# Patient Record
Sex: FEMALE | Race: WHITE | NOT HISPANIC OR LATINO | Employment: UNEMPLOYED | URBAN - METROPOLITAN AREA
[De-identification: names, ages, dates, MRNs, and addresses within clinical notes are randomized per-mention and may not be internally consistent; named-entity substitution may affect disease eponyms.]

---

## 2017-03-13 ENCOUNTER — HOSPITAL ENCOUNTER (EMERGENCY)
Facility: HOSPITAL | Age: 27
Discharge: HOME | End: 2017-03-13
Attending: EMERGENCY MEDICINE | Admitting: EMERGENCY MEDICINE

## 2017-03-13 ENCOUNTER — APPOINTMENT (OUTPATIENT)
Dept: CT IMAGING | Facility: HOSPITAL | Age: 27
End: 2017-03-13

## 2017-03-13 VITALS
RESPIRATION RATE: 16 BRPM | WEIGHT: 134 LBS | HEART RATE: 88 BPM | OXYGEN SATURATION: 99 % | TEMPERATURE: 98.2 F | SYSTOLIC BLOOD PRESSURE: 109 MMHG | DIASTOLIC BLOOD PRESSURE: 73 MMHG | HEIGHT: 62 IN | BODY MASS INDEX: 24.66 KG/M2

## 2017-03-13 DIAGNOSIS — R51.9 ACUTE NONINTRACTABLE HEADACHE, UNSPECIFIED HEADACHE TYPE: Primary | ICD-10-CM

## 2017-03-13 LAB
ALBUMIN SERPL-MCNC: 3.2 G/DL (ref 3.5–5.2)
ALBUMIN/GLOB SERPL: 0.5 G/DL
ALP SERPL-CCNC: 49 U/L (ref 40–129)
ALT SERPL W P-5'-P-CCNC: 50 U/L (ref 5–33)
ANION GAP SERPL CALCULATED.3IONS-SCNC: 14.1 MMOL/L
AST SERPL-CCNC: 46 U/L (ref 5–32)
BASOPHILS # BLD AUTO: 0.01 10*3/MM3 (ref 0–0.2)
BASOPHILS NFR BLD AUTO: 0.1 % (ref 0–2)
BILIRUB SERPL-MCNC: 0.3 MG/DL (ref 0.2–1.2)
BUN BLD-MCNC: 12 MG/DL (ref 6–20)
BUN/CREAT SERPL: 15.6 (ref 7–25)
CALCIUM SPEC-SCNC: 8.3 MG/DL (ref 8.6–10.5)
CHLORIDE SERPL-SCNC: 100 MMOL/L (ref 98–107)
CK SERPL-CCNC: 620 U/L (ref 26–142)
CO2 SERPL-SCNC: 22.9 MMOL/L (ref 22–29)
CREAT BLD-MCNC: 0.77 MG/DL (ref 0.57–1)
DEPRECATED RDW RBC AUTO: 49.5 FL (ref 37–54)
EOSINOPHIL # BLD AUTO: 0.01 10*3/MM3 (ref 0.1–0.3)
EOSINOPHIL NFR BLD AUTO: 0.1 % (ref 0–4)
ERYTHROCYTE [DISTWIDTH] IN BLOOD BY AUTOMATED COUNT: 15.3 % (ref 11.5–14.5)
GFR SERPL CREATININE-BSD FRML MDRD: 89 ML/MIN/1.73
GLOBULIN UR ELPH-MCNC: 6 GM/DL
GLUCOSE BLD-MCNC: 105 MG/DL (ref 65–99)
HCT VFR BLD AUTO: 29.8 % (ref 37–47)
HGB BLD-MCNC: 9.3 G/DL (ref 12–16)
IMM GRANULOCYTES # BLD: 0.04 10*3/MM3 (ref 0–0.03)
IMM GRANULOCYTES NFR BLD: 0.6 % (ref 0–0.5)
LYMPHOCYTES # BLD AUTO: 0.98 10*3/MM3 (ref 0.6–4.8)
LYMPHOCYTES NFR BLD AUTO: 13.6 % (ref 20–45)
MCH RBC QN AUTO: 28.1 PG (ref 27–31)
MCHC RBC AUTO-ENTMCNC: 31.2 G/DL (ref 31–37)
MCV RBC AUTO: 90 FL (ref 81–99)
MONOCYTES # BLD AUTO: 0.19 10*3/MM3 (ref 0–1)
MONOCYTES NFR BLD AUTO: 2.6 % (ref 3–8)
NEUTROPHILS # BLD AUTO: 6 10*3/MM3 (ref 1.5–8.3)
NEUTROPHILS NFR BLD AUTO: 83 % (ref 45–70)
NRBC BLD MANUAL-RTO: 0 /100 WBC (ref 0–0)
PLATELET # BLD AUTO: 262 10*3/MM3 (ref 140–500)
PMV BLD AUTO: 10.6 FL (ref 7.4–10.4)
POTASSIUM BLD-SCNC: 3.1 MMOL/L (ref 3.5–5.2)
PROT SERPL-MCNC: 9.2 G/DL (ref 6–8.5)
RBC # BLD AUTO: 3.31 10*6/MM3 (ref 4.2–5.4)
SODIUM BLD-SCNC: 137 MMOL/L (ref 136–145)
WBC NRBC COR # BLD: 7.23 10*3/MM3 (ref 4.8–10.8)

## 2017-03-13 PROCEDURE — 25010000002 DIPHENHYDRAMINE PER 50 MG: Performed by: EMERGENCY MEDICINE

## 2017-03-13 PROCEDURE — 70450 CT HEAD/BRAIN W/O DYE: CPT

## 2017-03-13 PROCEDURE — 96372 THER/PROPH/DIAG INJ SC/IM: CPT

## 2017-03-13 PROCEDURE — 25010000002 HYDROMORPHONE PER 4 MG

## 2017-03-13 PROCEDURE — 25010000002 METOCLOPRAMIDE PER 10 MG: Performed by: EMERGENCY MEDICINE

## 2017-03-13 PROCEDURE — 85025 COMPLETE CBC W/AUTO DIFF WBC: CPT | Performed by: EMERGENCY MEDICINE

## 2017-03-13 PROCEDURE — 99284 EMERGENCY DEPT VISIT MOD MDM: CPT

## 2017-03-13 PROCEDURE — 25010000002 ONDANSETRON PER 1 MG

## 2017-03-13 PROCEDURE — 99284 EMERGENCY DEPT VISIT MOD MDM: CPT | Performed by: EMERGENCY MEDICINE

## 2017-03-13 PROCEDURE — 96361 HYDRATE IV INFUSION ADD-ON: CPT

## 2017-03-13 PROCEDURE — 80053 COMPREHEN METABOLIC PANEL: CPT | Performed by: EMERGENCY MEDICINE

## 2017-03-13 PROCEDURE — 96375 TX/PRO/DX INJ NEW DRUG ADDON: CPT

## 2017-03-13 PROCEDURE — 82550 ASSAY OF CK (CPK): CPT | Performed by: EMERGENCY MEDICINE

## 2017-03-13 PROCEDURE — 96374 THER/PROPH/DIAG INJ IV PUSH: CPT

## 2017-03-13 RX ORDER — METOCLOPRAMIDE HYDROCHLORIDE 5 MG/ML
10 INJECTION INTRAMUSCULAR; INTRAVENOUS ONCE
Status: COMPLETED | OUTPATIENT
Start: 2017-03-13 | End: 2017-03-13

## 2017-03-13 RX ORDER — SUMATRIPTAN 6 MG/.5ML
6 INJECTION, SOLUTION SUBCUTANEOUS ONCE
Status: COMPLETED | OUTPATIENT
Start: 2017-03-13 | End: 2017-03-13

## 2017-03-13 RX ORDER — IBUPROFEN 400 MG/1
TABLET ORAL
Status: COMPLETED
Start: 2017-03-13 | End: 2017-03-13

## 2017-03-13 RX ORDER — FOLIC ACID 1 MG/1
1 TABLET ORAL DAILY
COMMUNITY
End: 2018-06-27

## 2017-03-13 RX ORDER — IBUPROFEN 400 MG/1
800 TABLET ORAL ONCE
Status: COMPLETED | OUTPATIENT
Start: 2017-03-13 | End: 2017-03-13

## 2017-03-13 RX ORDER — ONDANSETRON 2 MG/ML
INJECTION INTRAMUSCULAR; INTRAVENOUS
Status: COMPLETED
Start: 2017-03-13 | End: 2017-03-13

## 2017-03-13 RX ORDER — ONDANSETRON 2 MG/ML
4 INJECTION INTRAMUSCULAR; INTRAVENOUS ONCE
Status: COMPLETED | OUTPATIENT
Start: 2017-03-13 | End: 2017-03-13

## 2017-03-13 RX ORDER — METOCLOPRAMIDE 10 MG/1
10 TABLET ORAL 3 TIMES DAILY PRN
Qty: 20 TABLET | Refills: 0 | Status: SHIPPED | OUTPATIENT
Start: 2017-03-13 | End: 2018-07-02

## 2017-03-13 RX ORDER — DIPHENHYDRAMINE HYDROCHLORIDE 50 MG/ML
25 INJECTION INTRAMUSCULAR; INTRAVENOUS ONCE
Status: COMPLETED | OUTPATIENT
Start: 2017-03-13 | End: 2017-03-13

## 2017-03-13 RX ORDER — METHYLPREDNISOLONE 4 MG/1
2 TABLET ORAL DAILY
COMMUNITY
End: 2021-02-02

## 2017-03-13 RX ORDER — HYDROCODONE BITARTRATE AND ACETAMINOPHEN 5; 325 MG/1; MG/1
1 TABLET ORAL EVERY 4 HOURS PRN
Qty: 30 TABLET | Refills: 0 | Status: SHIPPED | OUTPATIENT
Start: 2017-03-13 | End: 2018-06-27

## 2017-03-13 RX ADMIN — METOCLOPRAMIDE 10 MG: 5 INJECTION, SOLUTION INTRAMUSCULAR; INTRAVENOUS at 08:25

## 2017-03-13 RX ADMIN — ONDANSETRON 4 MG: 2 INJECTION INTRAMUSCULAR; INTRAVENOUS at 05:26

## 2017-03-13 RX ADMIN — SUMATRIPTAN 6 MG: 6 INJECTION SUBCUTANEOUS at 06:08

## 2017-03-13 RX ADMIN — SUMATRIPTAN SUCCINATE 6 MG: 6 INJECTION, SOLUTION SUBCUTANEOUS at 07:13

## 2017-03-13 RX ADMIN — IBUPROFEN 800 MG: 400 TABLET ORAL at 06:50

## 2017-03-13 RX ADMIN — SODIUM CHLORIDE 1000 ML: 9 INJECTION, SOLUTION INTRAVENOUS at 05:24

## 2017-03-13 RX ADMIN — Medication 1 MG: at 05:26

## 2017-03-13 RX ADMIN — ONDANSETRON 4 MG: 2 INJECTION, SOLUTION INTRAMUSCULAR; INTRAVENOUS at 05:26

## 2017-03-13 RX ADMIN — HYDROMORPHONE HYDROCHLORIDE 1 MG: 1 INJECTION, SOLUTION INTRAMUSCULAR; INTRAVENOUS; SUBCUTANEOUS at 05:26

## 2017-03-13 RX ADMIN — DIPHENHYDRAMINE HYDROCHLORIDE 25 MG: 50 INJECTION, SOLUTION INTRAMUSCULAR; INTRAVENOUS at 08:28

## 2017-03-13 NOTE — ED PROVIDER NOTES
Subjective   History of Present Illness  History of Present Illness    Chief complaint: Headache    Location: Diffuse    Quality/Severity:  Severe    Timing/Onset/Duration: Awoke at 2 AM with this headache    Modifying Factors: Nothing seems to make it better or worse    Associated Symptoms: She denies any fever.  She has had chills.  There is no cough sore throat earache or nasal congestion.  No chest pain or shortness of breath.  No abdominal pain.  No diarrhea or burning when she urinates.  She has had nausea and vomiting is been nonbloody and nonbilious.    Narrative: This 26-year-old white female presents with a headache that woke her up from sleep at 2 AM.  Headache is diffuse.  She rates it as 10 over 10.  She has had nausea and vomiting, nonbloody, nonbilious.  She has had chills but no fever.  There is no cough sore throat earache or nasal congestion.  No chest pain or shortness of breath.  No abdominal pain.  No diarrhea or burning when she urinates.  The patient received IVIG on Saturday and Sunday for myositis.  The patient is also on methotrexate.    PCP:  Hang Jordan    Rheumatologist:  Hayden Collazo [phone] (paging ), office [phone].      Review of Systems   Constitutional: Positive for chills. Negative for fever.   HENT: Negative for congestion, ear pain and sore throat.    Eyes: Negative for pain and discharge.   Respiratory: Negative for cough, chest tightness, shortness of breath and wheezing.    Cardiovascular: Negative for chest pain, palpitations and leg swelling.   Gastrointestinal: Positive for nausea and vomiting (nonbloody, nonbilious). Negative for abdominal pain, blood in stool, constipation and diarrhea.   Genitourinary: Negative for decreased urine volume, dysuria, flank pain, frequency, genital sores, hematuria, menstrual problem, pelvic pain, urgency, vaginal bleeding, vaginal discharge and vaginal pain.   Musculoskeletal: Positive for myalgias. Negative for  back pain.   Skin: Negative for color change, pallor, rash and wound.   Neurological: Positive for headaches. Negative for weakness and numbness.   Hematological: Negative for adenopathy.   Psychiatric/Behavioral: Negative for confusion.        Medication List      ASK your doctor about these medications          CALCIUM 600 + D 600-200 MG-UNIT tablet   Generic drug:  Calcium Carb-Cholecalciferol       folic acid 1 MG tablet   Commonly known as:  FOLVITE       methotrexate 2.5 MG tablet       methylPREDNISolone 4 MG tablet   Commonly known as:  MEDROL           Past Medical History   Diagnosis Date   • Auto immune neutropenia        No Known Allergies    Past Surgical History   Procedure Laterality Date   • Adenoidectomy     • Skin biopsy         History reviewed. No pertinent family history.    Social History     Social History   • Marital status:      Spouse name: N/A   • Number of children: N/A   • Years of education: N/A     Social History Main Topics   • Smoking status: Former Smoker     Quit date: 3/13/2014   • Smokeless tobacco: None   • Alcohol use No   • Drug use: No   • Sexual activity: Not Asked     Other Topics Concern   • None     Social History Narrative   • None           Objective   Physical Exam   Constitutional: She is oriented to person, place, and time. She appears well-developed and well-nourished. No distress.   ED Triage Vitals:  Temp: 99.3 °F (37.4 °C) (03/13/17 0505)  Heart Rate: 96 (03/13/17 0505)  Resp: 14 (03/13/17 0505)  BP: 129/94 (03/13/17 0505)  SpO2: 99 % (03/13/17 0505)  Temp src: n/a  Heart Rate Source: Monitor (03/13/17 0505)  Patient Position: Sitting (03/13/17 0505)  BP Location: Right arm (03/13/17 0505)  FiO2 (%): n/a    The patient's vitals were reviewed by me.  Unless otherwise noted they are within normal limits.     HENT:   Head: Normocephalic and atraumatic.   Right Ear: External ear normal.   Left Ear: External ear normal.   Nose: Nose normal.   Mouth/Throat:  Oropharynx is clear and moist.   Eyes: Conjunctivae and EOM are normal. Pupils are equal, round, and reactive to light. Right eye exhibits no discharge. Left eye exhibits no discharge. No scleral icterus.   Neck: Normal range of motion. Neck supple. No JVD present. No tracheal deviation present. No thyromegaly present.   There are meningeal signs.   Cardiovascular: Normal rate, regular rhythm, normal heart sounds and intact distal pulses.  Exam reveals no gallop and no friction rub.    No murmur heard.  Pulmonary/Chest: Effort normal and breath sounds normal. No stridor. No respiratory distress. She has no wheezes. She has no rales. She exhibits no tenderness.   Abdominal: Soft. Bowel sounds are normal. She exhibits no distension and no mass. There is no tenderness. There is no rebound and no guarding. No hernia.   Musculoskeletal: Normal range of motion. She exhibits no edema or deformity.   Lymphadenopathy:     She has no cervical adenopathy.   Neurological: She is alert and oriented to person, place, and time. No cranial nerve deficit. She exhibits normal muscle tone.   Skin: Skin is warm and dry. No rash noted. She is not diaphoretic. No erythema. No pallor.   Psychiatric: Her behavior is normal.   Nursing note and vitals reviewed.      Procedures         ED Course  ED Course   Comment By Time   The laboratory values were reviewed by me.  The CK is 620.  The hemoglobin is 9.3.  The hematocrit is 29.8.  The neutrophil percentage is 83%.  The left side percentage is 13.6%.  The serum glucose is 105.  The potassium is 2.1.  The calcium is 8.3.  The total protein is 9.2.  The albumin is 3.2.  The ALT is 50 and AST is 46.  Laboratory values are otherwise unremarkable. Yeyo Rocha MD 03/13 0630                  [facility]  CT Head Without Contrast   ED Interpretation   The CT of the head as read by the radiologist is negative.        Labs Reviewed   CBC WITH AUTO DIFFERENTIAL - Abnormal; Notable for the following:         Result Value    RBC 3.31 (*)     Hemoglobin 9.3 (*)     Hematocrit 29.8 (*)     RDW 15.3 (*)     MPV 10.6 (*)     Neutrophil % 83.0 (*)     Lymphocyte % 13.6 (*)     Monocyte % 2.6 (*)     Immature Grans % 0.6 (*)     Eosinophils, Absolute 0.01 (*)     Immature Grans, Absolute 0.04 (*)     All other components within normal limits   COMPREHENSIVE METABOLIC PANEL   CK   CBC AND DIFFERENTIAL    Narrative:     The following orders were created for panel order CBC & Differential.  Procedure                               Abnormality         Status                     ---------                               -----------         ------                     CBC Auto Differential[[id number]]         Abnormal            Final result                 Please view results for these tests on the individual orders.     6:27 AM, 03/13/17:  I spoke with Dr. Patricia Schwarz, rheumatologist on call for the patient's rheumatologist at , Dr. Hayden Collazo, he agrees with the treatment plan.  The patient is to call Dr. Collazo or whoever is on-call for him this afternoon or tomorrow morning for follow-up.  Patient is to go to the [facility] emergency department if she worsens.    7:09 AM, 03/13/17:  It was discussed with the patient that the etiology of her headaches were most likely due to the IV Ig.  The patient has been instructed to call Dr. Collazo today for a follow-up in 1-2 days.  She has been instructed to return to the emergency department if she has worsening headache, fever, vomiting, worse in any way at all.  All the patient's questions were answered she will be discharged in good condition.    7:31 AM, 03/13/17:  The case was discussed with Dr. Frost.  He will assume care of the patient.  The plan will be to discharge the patient home if she has improvement in her symptoms.  If she does not improve, she may need to be transferred to [facility] for further evaluation and treatment related to their  treatment of her polymyositis with IVIG.    Final diagnoses:   None         ED Medications:  Medications   sodium chloride 0.9 % bolus 1,000 mL (1,000 mL Intravenous New Bag 3/13/17 0524)   HYDROmorphone (DILAUDID) injection 1 mg (1 mg Intravenous Given 3/13/17 0526)   ondansetron (ZOFRAN) injection 4 mg (4 mg Intravenous Given 3/13/17 0526)   SUMAtriptan (IMITREX) injection 6 mg (6 mg Subcutaneous Given 3/13/17 0608)       New Medications:     Medication List      ASK your doctor about these medications          CALCIUM 600 + D 600-200 MG-UNIT tablet   Generic drug:  Calcium Carb-Cholecalciferol       folic acid 1 MG tablet   Commonly known as:  FOLVITE       methotrexate 2.5 MG tablet       methylPREDNISolone 4 MG tablet   Commonly known as:  MEDROL           Stopped Medications:     Medication List      ASK your doctor about these medications          CALCIUM 600 + D 600-200 MG-UNIT tablet   Generic drug:  Calcium Carb-Cholecalciferol       folic acid 1 MG tablet   Commonly known as:  FOLVITE       methotrexate 2.5 MG tablet       methylPREDNISolone 4 MG tablet   Commonly known as:  MEDROL             Final diagnoses:   Acute nonintractable headache, unspecified headache type     Patient had symptom relief after reglan, now 6/10 severity, would like to go home and rest.  Strict return precautions discussed.  She has no meningismus now, afebrile, likely secondary to IVIg, LP not warranted at this time.       Michael Frost MD  03/13/17 8619

## 2017-03-13 NOTE — ED NOTES
Pt states her headache is 10/10. VSS, continue to monitor     Maulik Hilliard, OSMEL  03/13/17 0818

## 2017-03-13 NOTE — DISCHARGE INSTRUCTIONS
Rest, drink lots of fluids, take Motrin as directed as needed for headache.  Call Dr. Willard today for a follow-up in 1-2 days.  Return to the emergency department if you have increasing pain, fever, vomiting, worse in any way at all.

## 2017-03-13 NOTE — ED NOTES
Pt looking at her phone and states her headache is the same,10/10. has re-evaluated pt.     Maulik Hilliard RN  03/13/17 1637

## 2018-01-30 ENCOUNTER — HOSPITAL ENCOUNTER (OUTPATIENT)
Dept: GENERAL RADIOLOGY | Facility: HOSPITAL | Age: 28
Discharge: HOME | End: 2018-01-30
Admitting: NURSE PRACTITIONER

## 2018-01-30 ENCOUNTER — TRANSCRIBE ORDERS (OUTPATIENT)
Dept: ADMINISTRATIVE | Facility: HOSPITAL | Age: 28
End: 2018-01-30

## 2018-01-30 DIAGNOSIS — R10.9 ABDOMINAL PAIN, UNSPECIFIED ABDOMINAL LOCATION: Primary | ICD-10-CM

## 2018-01-30 DIAGNOSIS — R10.9 ABDOMINAL PAIN, UNSPECIFIED ABDOMINAL LOCATION: ICD-10-CM

## 2018-01-30 PROCEDURE — 74019 RADEX ABDOMEN 2 VIEWS: CPT

## 2018-06-26 ENCOUNTER — TELEPHONE (OUTPATIENT)
Dept: SURGERY | Facility: CLINIC | Age: 28
End: 2018-06-26

## 2018-06-27 ENCOUNTER — OFFICE VISIT (OUTPATIENT)
Dept: SURGERY | Facility: CLINIC | Age: 28
End: 2018-06-27

## 2018-06-27 VITALS
WEIGHT: 157 LBS | SYSTOLIC BLOOD PRESSURE: 118 MMHG | BODY MASS INDEX: 28.89 KG/M2 | DIASTOLIC BLOOD PRESSURE: 72 MMHG | HEIGHT: 62 IN

## 2018-06-27 DIAGNOSIS — I87.8 POOR VENOUS ACCESS: Primary | ICD-10-CM

## 2018-06-27 PROBLEM — M60.9 MYOSITIS: Status: ACTIVE | Noted: 2018-01-30

## 2018-06-27 PROBLEM — D89.89 ANTISYNTHETASE SYNDROME: Status: ACTIVE | Noted: 2018-01-30

## 2018-06-27 PROBLEM — F32.A DEPRESSIVE DISORDER: Status: ACTIVE | Noted: 2017-08-09

## 2018-06-27 PROCEDURE — 99203 OFFICE O/P NEW LOW 30 MIN: CPT | Performed by: SURGERY

## 2018-06-27 RX ORDER — AZATHIOPRINE 50 MG/1
150 TABLET ORAL EVERY MORNING
COMMUNITY
Start: 2018-05-13 | End: 2021-03-29

## 2018-06-27 RX ORDER — OMEPRAZOLE 40 MG/1
40 CAPSULE, DELAYED RELEASE ORAL DAILY
COMMUNITY
Start: 2018-05-23 | End: 2019-11-11

## 2018-06-27 RX ORDER — IBANDRONATE SODIUM 150 MG/1
150 TABLET, FILM COATED ORAL
COMMUNITY
Start: 2018-05-23 | End: 2019-01-02

## 2018-06-27 RX ORDER — PREDNISONE 20 MG/1
TABLET ORAL
COMMUNITY
Start: 2018-05-23 | End: 2019-01-02

## 2018-06-27 RX ORDER — FLUOXETINE HYDROCHLORIDE 20 MG/1
20 CAPSULE ORAL DAILY
COMMUNITY
Start: 2018-06-20 | End: 2019-01-02

## 2018-06-27 RX ORDER — TRAMADOL HYDROCHLORIDE 50 MG/1
50 TABLET ORAL EVERY 12 HOURS SCHEDULED
COMMUNITY
End: 2019-01-02

## 2018-06-27 RX ORDER — MYCOPHENOLATE MOFETIL 500 MG/1
1000 TABLET ORAL EVERY 12 HOURS SCHEDULED
COMMUNITY
Start: 2018-06-25 | End: 2022-08-24

## 2018-06-27 RX ORDER — ONDANSETRON 4 MG/1
TABLET, FILM COATED ORAL
COMMUNITY
End: 2019-01-02

## 2018-06-27 NOTE — PROGRESS NOTES
PATIENT INFORMATION  Rachel Srinivasan  CONS PORT PLACEMENT, PT GETS IVIG INFUSIONS MONTHLY FOR 2 DAYS, PT STATES THEY HAVE BEEN HAVING ISSUES FINDING HER VEINS TO DO INFUSIONS SO THEY RECOMMENDED A PORT     - 1990    CHIEF COMPLAINT  Chief Complaint   Patient presents with   • Pre-op Exam       HISTORY OF PRESENT ILLNESS  HPI she has a history of myositis.  She receives infusions for this that are ongoing.  Her father had a port placed.  She is having increasing problems with her peripheral veins.  And a port has been recommended.        REVIEW OF SYSTEMS  Review of Systems chronic steroid use.  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 No pertinent family history.      SOCIAL HISTORY  Social History     Occupational History   • Not on file.     Social History Main Topics   • Smoking status: Former Smoker     Quit date: 3/13/2014   • Smokeless tobacco: Not on file   • Alcohol use No   • Drug use: No   • Sexual activity: Not on file         CURRENT MEDICATIONS    Current Outpatient Prescriptions:   •  azaTHIOprine (IMURAN) 50 MG tablet, , Disp: , Rfl:   •  Calcium Carb-Cholecalciferol (CALCIUM 600 + D) 600-200 MG-UNIT tablet, Take 1 tablet by mouth 2 (Two) Times a Day., Disp: , Rfl:   •  FLUoxetine (PROzac) 20 MG capsule, , Disp: , Rfl:   •  ibandronate (BONIVA) 150 MG tablet, , Disp: , Rfl:   •  methylPREDNISolone (MEDROL) 4 MG tablet, Take 4 mg by mouth Daily. Take 3 tablets daily, Disp: , Rfl:   •  metoclopramide (REGLAN) 10 MG tablet, Take 1 tablet by mouth 3 (Three) Times a Day As Needed (headache).,  "Disp: 20 tablet, Rfl: 0  •  mycophenolate (CELLCEPT) 500 MG tablet, , Disp: , Rfl:   •  omeprazole (priLOSEC) 40 MG capsule, , Disp: , Rfl:   •  ondansetron (ZOFRAN) 4 MG tablet, ondansetron HCl 4 mg tablet  WITH IVIG INFUSSION, Disp: , Rfl:   •  predniSONE (DELTASONE) 20 MG tablet, , Disp: , Rfl:   •  traMADol (ULTRAM) 50 MG tablet, Every 12 (Twelve) Hours., Disp: , Rfl:     ALLERGIES  Patient has no known allergies.    VITALS  Vitals:    06/27/18 1350   BP: 118/72   Weight: 71.2 kg (157 lb)   Height: 157.5 cm (62\")       LAST RESULTS   Admission on 03/13/2017, Discharged on 03/13/2017   Component Date Value Ref Range Status   • Glucose 03/13/2017 105* 65 - 99 mg/dL Final   • BUN 03/13/2017 12  6 - 20 mg/dL Final   • Creatinine 03/13/2017 0.77  0.57 - 1.00 mg/dL Final   • Sodium 03/13/2017 137  136 - 145 mmol/L Final   • Potassium 03/13/2017 3.1* 3.5 - 5.2 mmol/L Final   • Chloride 03/13/2017 100  98 - 107 mmol/L Final   • CO2 03/13/2017 22.9  22.0 - 29.0 mmol/L Final   • Calcium 03/13/2017 8.3* 8.6 - 10.5 mg/dL Final   • Total Protein 03/13/2017 9.2* 6.0 - 8.5 g/dL Final   • Albumin 03/13/2017 3.20* 3.50 - 5.20 g/dL Final   • ALT (SGPT) 03/13/2017 50* 5 - 33 U/L Final   • AST (SGOT) 03/13/2017 46* 5 - 32 U/L Final   • Alkaline Phosphatase 03/13/2017 49  40 - 129 U/L Final   • Total Bilirubin 03/13/2017 0.3  0.2 - 1.2 mg/dL Final   • eGFR Non African Amer 03/13/2017 89  >60 mL/min/1.73 Final   • Globulin 03/13/2017 6.0  gm/dL Final   • A/G Ratio 03/13/2017 0.5  g/dL Final   • BUN/Creatinine Ratio 03/13/2017 15.6  7.0 - 25.0 Final   • Anion Gap 03/13/2017 14.1  mmol/L Final   • Creatine Kinase 03/13/2017 620* 26 - 142 U/L Final   • WBC 03/13/2017 7.23  4.80 - 10.80 10*3/mm3 Final   • RBC 03/13/2017 3.31* 4.20 - 5.40 10*6/mm3 Final   • Hemoglobin 03/13/2017 9.3* 12.0 - 16.0 g/dL Final   • Hematocrit 03/13/2017 29.8* 37.0 - 47.0 % Final   • MCV 03/13/2017 90.0  81.0 - 99.0 fL Final   • MCH 03/13/2017 28.1  27.0 - 31.0 " pg Final   • MCHC 03/13/2017 31.2  31.0 - 37.0 g/dL Final   • RDW 03/13/2017 15.3* 11.5 - 14.5 % Final   • RDW-SD 03/13/2017 49.5  37.0 - 54.0 fl Final   • MPV 03/13/2017 10.6* 7.4 - 10.4 fL Final   • Platelets 03/13/2017 262  140 - 500 10*3/mm3 Final   • Neutrophil % 03/13/2017 83.0* 45.0 - 70.0 % Final   • Lymphocyte % 03/13/2017 13.6* 20.0 - 45.0 % Final   • Monocyte % 03/13/2017 2.6* 3.0 - 8.0 % Final   • Eosinophil % 03/13/2017 0.1  0.0 - 4.0 % Final   • Basophil % 03/13/2017 0.1  0.0 - 2.0 % Final   • Immature Grans % 03/13/2017 0.6* 0.0 - 0.5 % Final   • Neutrophils, Absolute 03/13/2017 6.00  1.50 - 8.30 10*3/mm3 Final   • Lymphocytes, Absolute 03/13/2017 0.98  0.60 - 4.80 10*3/mm3 Final   • Monocytes, Absolute 03/13/2017 0.19  0.00 - 1.00 10*3/mm3 Final   • Eosinophils, Absolute 03/13/2017 0.01* 0.10 - 0.30 10*3/mm3 Final   • Basophils, Absolute 03/13/2017 0.01  0.00 - 0.20 10*3/mm3 Final   • Immature Grans, Absolute 03/13/2017 0.04* 0.00 - 0.03 10*3/mm3 Final   • nRBC 03/13/2017 0.0  0.0 - 0.0 /100 WBC Final     No results found.    PHYSICAL EXAM  Physical Exam  So alert white female in no active distress.  She does have a moon-type face.  Her heart shows regular rate and rhythm her lungs are clear and equal.  She is oriented ×3.  She has very poor peripheral veins in her arms.  I reviewed her records from [facility].  ASSESSMENT    Failure of venous access    PLAN  The risks benefits and options were discussed with her in detail.  We will proceed with a PowerPort placement at [facility] on July 9.  We will give her preoperative antibiotic and a preoperative dose of hydrocortisone.

## 2018-06-27 NOTE — H&P
PATIENT INFORMATION  Rachel Srinivasan  CONS PORT PLACEMENT, PT GETS IVIG INFUSIONS MONTHLY FOR 2 DAYS, PT STATES THEY HAVE BEEN HAVING ISSUES FINDING HER VEINS TO DO INFUSIONS SO THEY RECOMMENDED A PORT     - 1990    CHIEF COMPLAINT  Chief Complaint   Patient presents with   • Pre-op Exam       HISTORY OF PRESENT ILLNESS  HPI she has a history of myositis.  She receives infusions for this that are ongoing.  Her father had a port placed.  She is having increasing problems with her peripheral veins.  And a port has been recommended.        REVIEW OF SYSTEMS  Review of Systems chronic steroid use.  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 No pertinent family history.      SOCIAL HISTORY  Social History     Occupational History   • Not on file.     Social History Main Topics   • Smoking status: Former Smoker     Quit date: 3/13/2014   • Smokeless tobacco: Not on file   • Alcohol use No   • Drug use: No   • Sexual activity: Not on file         CURRENT MEDICATIONS    Current Outpatient Prescriptions:   •  azaTHIOprine (IMURAN) 50 MG tablet, , Disp: , Rfl:   •  Calcium Carb-Cholecalciferol (CALCIUM 600 + D) 600-200 MG-UNIT tablet, Take 1 tablet by mouth 2 (Two) Times a Day., Disp: , Rfl:   •  FLUoxetine (PROzac) 20 MG capsule, , Disp: , Rfl:   •  ibandronate (BONIVA) 150 MG tablet, , Disp: , Rfl:   •  methylPREDNISolone (MEDROL) 4 MG tablet, Take 4 mg by mouth Daily. Take 3 tablets daily, Disp: , Rfl:   •  metoclopramide (REGLAN) 10 MG tablet, Take 1 tablet by mouth 3 (Three) Times a Day As Needed (headache).,  "Disp: 20 tablet, Rfl: 0  •  mycophenolate (CELLCEPT) 500 MG tablet, , Disp: , Rfl:   •  omeprazole (priLOSEC) 40 MG capsule, , Disp: , Rfl:   •  ondansetron (ZOFRAN) 4 MG tablet, ondansetron HCl 4 mg tablet  WITH IVIG INFUSSION, Disp: , Rfl:   •  predniSONE (DELTASONE) 20 MG tablet, , Disp: , Rfl:   •  traMADol (ULTRAM) 50 MG tablet, Every 12 (Twelve) Hours., Disp: , Rfl:     ALLERGIES  Patient has no known allergies.    VITALS  Vitals:    06/27/18 1350   BP: 118/72   Weight: 71.2 kg (157 lb)   Height: 157.5 cm (62\")       LAST RESULTS   Admission on 03/13/2017, Discharged on 03/13/2017   Component Date Value Ref Range Status   • Glucose 03/13/2017 105* 65 - 99 mg/dL Final   • BUN 03/13/2017 12  6 - 20 mg/dL Final   • Creatinine 03/13/2017 0.77  0.57 - 1.00 mg/dL Final   • Sodium 03/13/2017 137  136 - 145 mmol/L Final   • Potassium 03/13/2017 3.1* 3.5 - 5.2 mmol/L Final   • Chloride 03/13/2017 100  98 - 107 mmol/L Final   • CO2 03/13/2017 22.9  22.0 - 29.0 mmol/L Final   • Calcium 03/13/2017 8.3* 8.6 - 10.5 mg/dL Final   • Total Protein 03/13/2017 9.2* 6.0 - 8.5 g/dL Final   • Albumin 03/13/2017 3.20* 3.50 - 5.20 g/dL Final   • ALT (SGPT) 03/13/2017 50* 5 - 33 U/L Final   • AST (SGOT) 03/13/2017 46* 5 - 32 U/L Final   • Alkaline Phosphatase 03/13/2017 49  40 - 129 U/L Final   • Total Bilirubin 03/13/2017 0.3  0.2 - 1.2 mg/dL Final   • eGFR Non African Amer 03/13/2017 89  >60 mL/min/1.73 Final   • Globulin 03/13/2017 6.0  gm/dL Final   • A/G Ratio 03/13/2017 0.5  g/dL Final   • BUN/Creatinine Ratio 03/13/2017 15.6  7.0 - 25.0 Final   • Anion Gap 03/13/2017 14.1  mmol/L Final   • Creatine Kinase 03/13/2017 620* 26 - 142 U/L Final   • WBC 03/13/2017 7.23  4.80 - 10.80 10*3/mm3 Final   • RBC 03/13/2017 3.31* 4.20 - 5.40 10*6/mm3 Final   • Hemoglobin 03/13/2017 9.3* 12.0 - 16.0 g/dL Final   • Hematocrit 03/13/2017 29.8* 37.0 - 47.0 % Final   • MCV 03/13/2017 90.0  81.0 - 99.0 fL Final   • MCH 03/13/2017 28.1  27.0 - 31.0 " pg Final   • MCHC 03/13/2017 31.2  31.0 - 37.0 g/dL Final   • RDW 03/13/2017 15.3* 11.5 - 14.5 % Final   • RDW-SD 03/13/2017 49.5  37.0 - 54.0 fl Final   • MPV 03/13/2017 10.6* 7.4 - 10.4 fL Final   • Platelets 03/13/2017 262  140 - 500 10*3/mm3 Final   • Neutrophil % 03/13/2017 83.0* 45.0 - 70.0 % Final   • Lymphocyte % 03/13/2017 13.6* 20.0 - 45.0 % Final   • Monocyte % 03/13/2017 2.6* 3.0 - 8.0 % Final   • Eosinophil % 03/13/2017 0.1  0.0 - 4.0 % Final   • Basophil % 03/13/2017 0.1  0.0 - 2.0 % Final   • Immature Grans % 03/13/2017 0.6* 0.0 - 0.5 % Final   • Neutrophils, Absolute 03/13/2017 6.00  1.50 - 8.30 10*3/mm3 Final   • Lymphocytes, Absolute 03/13/2017 0.98  0.60 - 4.80 10*3/mm3 Final   • Monocytes, Absolute 03/13/2017 0.19  0.00 - 1.00 10*3/mm3 Final   • Eosinophils, Absolute 03/13/2017 0.01* 0.10 - 0.30 10*3/mm3 Final   • Basophils, Absolute 03/13/2017 0.01  0.00 - 0.20 10*3/mm3 Final   • Immature Grans, Absolute 03/13/2017 0.04* 0.00 - 0.03 10*3/mm3 Final   • nRBC 03/13/2017 0.0  0.0 - 0.0 /100 WBC Final     No results found.    PHYSICAL EXAM  Physical Exam  So alert white female in no active distress.  She does have a moon-type face.  Her heart shows regular rate and rhythm her lungs are clear and equal.  She is oriented ×3.  She has very poor peripheral veins in her arms.  I reviewed her records from [facility].  ASSESSMENT    Failure of venous access    PLAN  The risks benefits and options were discussed with her in detail.  We will proceed with a PowerPort placement at [facility] on July 9.  We will give her preoperative antibiotic and a preoperative dose of hydrocortisone.

## 2018-07-02 ENCOUNTER — LAB (OUTPATIENT)
Dept: LAB | Facility: HOSPITAL | Age: 28
End: 2018-07-02
Attending: SURGERY

## 2018-07-02 DIAGNOSIS — I87.8 POOR VENOUS ACCESS: ICD-10-CM

## 2018-07-02 LAB
DEPRECATED RDW RBC AUTO: 64.2 FL (ref 37–54)
ERYTHROCYTE [DISTWIDTH] IN BLOOD BY AUTOMATED COUNT: 20 % (ref 11.5–14.5)
HCT VFR BLD AUTO: 37.8 % (ref 37–47)
HGB BLD-MCNC: 12 G/DL (ref 12–16)
MCH RBC QN AUTO: 28.4 PG (ref 27–31)
MCHC RBC AUTO-ENTMCNC: 31.7 G/DL (ref 31–37)
MCV RBC AUTO: 89.6 FL (ref 81–99)
PLATELET # BLD AUTO: 108 10*3/MM3 (ref 140–500)
PMV BLD AUTO: 12.4 FL (ref 7.4–10.4)
RBC # BLD AUTO: 4.22 10*6/MM3 (ref 4.2–5.4)
WBC NRBC COR # BLD: 7.87 10*3/MM3 (ref 4.8–10.8)

## 2018-07-02 PROCEDURE — 36415 COLL VENOUS BLD VENIPUNCTURE: CPT

## 2018-07-02 PROCEDURE — 85027 COMPLETE CBC AUTOMATED: CPT

## 2018-07-02 RX ORDER — MULTIPLE VITAMINS W/ MINERALS TAB 9MG-400MCG
1 TAB ORAL DAILY
COMMUNITY
End: 2019-01-02

## 2018-07-05 ENCOUNTER — ANESTHESIA EVENT (OUTPATIENT)
Dept: PERIOP | Facility: HOSPITAL | Age: 28
End: 2018-07-05

## 2018-07-06 ENCOUNTER — ANESTHESIA (OUTPATIENT)
Dept: PERIOP | Facility: HOSPITAL | Age: 28
End: 2018-07-06

## 2018-07-06 ENCOUNTER — APPOINTMENT (OUTPATIENT)
Dept: GENERAL RADIOLOGY | Facility: HOSPITAL | Age: 28
End: 2018-07-06

## 2018-07-06 ENCOUNTER — HOSPITAL ENCOUNTER (OUTPATIENT)
Facility: HOSPITAL | Age: 28
Setting detail: HOSPITAL OUTPATIENT SURGERY
Discharge: HOME | End: 2018-07-06
Attending: SURGERY | Admitting: NURSE ANESTHETIST, CERTIFIED REGISTERED

## 2018-07-06 VITALS
DIASTOLIC BLOOD PRESSURE: 87 MMHG | HEIGHT: 62 IN | RESPIRATION RATE: 16 BRPM | HEART RATE: 90 BPM | OXYGEN SATURATION: 99 % | SYSTOLIC BLOOD PRESSURE: 124 MMHG | BODY MASS INDEX: 28.49 KG/M2 | WEIGHT: 154.8 LBS | TEMPERATURE: 97.6 F

## 2018-07-06 DIAGNOSIS — D89.89 ANTISYNTHETASE SYNDROME (HCC): ICD-10-CM

## 2018-07-06 DIAGNOSIS — I87.8 POOR VENOUS ACCESS: ICD-10-CM

## 2018-07-06 LAB — HCG SERPL QL: NEGATIVE

## 2018-07-06 PROCEDURE — 25010000002 ONDANSETRON PER 1 MG: Performed by: ANESTHESIOLOGY

## 2018-07-06 PROCEDURE — 84703 CHORIONIC GONADOTROPIN ASSAY: CPT | Performed by: NURSE ANESTHETIST, CERTIFIED REGISTERED

## 2018-07-06 PROCEDURE — C1788 PORT, INDWELLING, IMP: HCPCS | Performed by: SURGERY

## 2018-07-06 PROCEDURE — 71045 X-RAY EXAM CHEST 1 VIEW: CPT

## 2018-07-06 PROCEDURE — 36561 INSERT TUNNELED CV CATH: CPT | Performed by: SURGERY

## 2018-07-06 PROCEDURE — 76000 FLUOROSCOPY <1 HR PHYS/QHP: CPT

## 2018-07-06 PROCEDURE — 25010000002 HEPARIN LOCK FLUSH 10 UNIT/ML SOLUTION: Performed by: SURGERY

## 2018-07-06 PROCEDURE — 25010000002 MIDAZOLAM PER 1 MG: Performed by: NURSE ANESTHETIST, CERTIFIED REGISTERED

## 2018-07-06 PROCEDURE — 25010000002 HYDROCORTISONE SODIUM SUCCINATE 100 MG RECONSTITUTED SOLUTION: Performed by: SURGERY

## 2018-07-06 PROCEDURE — 25010000002 ONDANSETRON PER 1 MG: Performed by: NURSE ANESTHETIST, CERTIFIED REGISTERED

## 2018-07-06 PROCEDURE — 25010000002 FENTANYL CITRATE (PF) 100 MCG/2ML SOLUTION: Performed by: ANESTHESIOLOGY

## 2018-07-06 PROCEDURE — 25010000002 PROPOFOL 10 MG/ML EMULSION: Performed by: ANESTHESIOLOGY

## 2018-07-06 PROCEDURE — 25010000003 CEFAZOLIN PER 500 MG: Performed by: SURGERY

## 2018-07-06 PROCEDURE — 77001 FLUOROGUIDE FOR VEIN DEVICE: CPT | Performed by: SURGERY

## 2018-07-06 DEVICE — POWERPORT ISP IMPLANTABLE PORT WITH ATTACHABLE 6F CHRONOFLEX OPEN-ENDED SINGLE-LUMEN VENOUS CATHETER.  INTERMEDIATE KIT (WITHOUT SUTURE PLUG)
Type: IMPLANTABLE DEVICE | Site: CHEST | Status: NON-FUNCTIONAL
Brand: POWERPORT, CHRONOFLEX
Removed: 2020-02-07

## 2018-07-06 RX ORDER — OXYCODONE HYDROCHLORIDE AND ACETAMINOPHEN 5; 325 MG/1; MG/1
1 TABLET ORAL ONCE AS NEEDED
Status: DISCONTINUED | OUTPATIENT
Start: 2018-07-06 | End: 2018-07-06 | Stop reason: HOSPADM

## 2018-07-06 RX ORDER — MIDAZOLAM HYDROCHLORIDE 1 MG/ML
2 INJECTION INTRAMUSCULAR; INTRAVENOUS
Status: DISCONTINUED | OUTPATIENT
Start: 2018-07-06 | End: 2018-07-06 | Stop reason: HOSPADM

## 2018-07-06 RX ORDER — SODIUM CHLORIDE 0.9 % (FLUSH) 0.9 %
1-10 SYRINGE (ML) INJECTION AS NEEDED
Status: DISCONTINUED | OUTPATIENT
Start: 2018-07-06 | End: 2018-07-06 | Stop reason: HOSPADM

## 2018-07-06 RX ORDER — HEPARIN SODIUM,PORCINE 10 UNIT/ML
VIAL (ML) INTRAVENOUS AS NEEDED
Status: DISCONTINUED | OUTPATIENT
Start: 2018-07-06 | End: 2018-07-06 | Stop reason: HOSPADM

## 2018-07-06 RX ORDER — LIDOCAINE HYDROCHLORIDE 10 MG/ML
INJECTION, SOLUTION INFILTRATION; PERINEURAL AS NEEDED
Status: DISCONTINUED | OUTPATIENT
Start: 2018-07-06 | End: 2018-07-06 | Stop reason: HOSPADM

## 2018-07-06 RX ORDER — FERROUS SULFATE 325(65) MG
325 TABLET ORAL
COMMUNITY
End: 2019-01-02

## 2018-07-06 RX ORDER — ONDANSETRON 2 MG/ML
4 INJECTION INTRAMUSCULAR; INTRAVENOUS ONCE AS NEEDED
Status: COMPLETED | OUTPATIENT
Start: 2018-07-06 | End: 2018-07-06

## 2018-07-06 RX ORDER — LIDOCAINE HYDROCHLORIDE 10 MG/ML
0.5 INJECTION, SOLUTION EPIDURAL; INFILTRATION; INTRACAUDAL; PERINEURAL ONCE AS NEEDED
Status: COMPLETED | OUTPATIENT
Start: 2018-07-06 | End: 2018-07-06

## 2018-07-06 RX ORDER — PROPOFOL 10 MG/ML
VIAL (ML) INTRAVENOUS AS NEEDED
Status: DISCONTINUED | OUTPATIENT
Start: 2018-07-06 | End: 2018-07-06 | Stop reason: SURG

## 2018-07-06 RX ORDER — SODIUM CHLORIDE 9 MG/ML
INJECTION, SOLUTION INTRAVENOUS AS NEEDED
Status: DISCONTINUED | OUTPATIENT
Start: 2018-07-06 | End: 2018-07-06 | Stop reason: HOSPADM

## 2018-07-06 RX ORDER — MIDAZOLAM HYDROCHLORIDE 1 MG/ML
1 INJECTION INTRAMUSCULAR; INTRAVENOUS
Status: DISCONTINUED | OUTPATIENT
Start: 2018-07-06 | End: 2018-07-06 | Stop reason: HOSPADM

## 2018-07-06 RX ORDER — MEPERIDINE HYDROCHLORIDE 25 MG/ML
12.5 INJECTION INTRAMUSCULAR; INTRAVENOUS; SUBCUTANEOUS
Status: DISCONTINUED | OUTPATIENT
Start: 2018-07-06 | End: 2018-07-06 | Stop reason: HOSPADM

## 2018-07-06 RX ORDER — SODIUM CHLORIDE, SODIUM LACTATE, POTASSIUM CHLORIDE, CALCIUM CHLORIDE 600; 310; 30; 20 MG/100ML; MG/100ML; MG/100ML; MG/100ML
9 INJECTION, SOLUTION INTRAVENOUS CONTINUOUS
Status: DISCONTINUED | OUTPATIENT
Start: 2018-07-06 | End: 2018-07-06 | Stop reason: HOSPADM

## 2018-07-06 RX ORDER — SODIUM CHLORIDE 9 MG/ML
40 INJECTION, SOLUTION INTRAVENOUS AS NEEDED
Status: DISCONTINUED | OUTPATIENT
Start: 2018-07-06 | End: 2018-07-06 | Stop reason: HOSPADM

## 2018-07-06 RX ORDER — SODIUM CHLORIDE, SODIUM LACTATE, POTASSIUM CHLORIDE, CALCIUM CHLORIDE 600; 310; 30; 20 MG/100ML; MG/100ML; MG/100ML; MG/100ML
100 INJECTION, SOLUTION INTRAVENOUS CONTINUOUS
Status: DISCONTINUED | OUTPATIENT
Start: 2018-07-06 | End: 2018-07-06 | Stop reason: HOSPADM

## 2018-07-06 RX ORDER — OXYCODONE HYDROCHLORIDE AND ACETAMINOPHEN 5; 325 MG/1; MG/1
1-2 TABLET ORAL EVERY 4 HOURS PRN
Qty: 30 TABLET | Refills: 0 | Status: SHIPPED | OUTPATIENT
Start: 2018-07-06 | End: 2019-01-02

## 2018-07-06 RX ORDER — LIDOCAINE HYDROCHLORIDE 10 MG/ML
0.5 INJECTION, SOLUTION EPIDURAL; INFILTRATION; INTRACAUDAL; PERINEURAL ONCE AS NEEDED
Status: DISCONTINUED | OUTPATIENT
Start: 2018-07-06 | End: 2018-07-06 | Stop reason: HOSPADM

## 2018-07-06 RX ORDER — MAGNESIUM HYDROXIDE 1200 MG/15ML
LIQUID ORAL AS NEEDED
Status: DISCONTINUED | OUTPATIENT
Start: 2018-07-06 | End: 2018-07-06 | Stop reason: HOSPADM

## 2018-07-06 RX ORDER — FENTANYL CITRATE 50 UG/ML
INJECTION, SOLUTION INTRAMUSCULAR; INTRAVENOUS AS NEEDED
Status: DISCONTINUED | OUTPATIENT
Start: 2018-07-06 | End: 2018-07-06 | Stop reason: SURG

## 2018-07-06 RX ADMIN — FENTANYL CITRATE 25 MCG: 50 INJECTION, SOLUTION INTRAMUSCULAR; INTRAVENOUS at 09:05

## 2018-07-06 RX ADMIN — LIDOCAINE HYDROCHLORIDE 0.5 ML: 10 INJECTION, SOLUTION EPIDURAL; INFILTRATION; INTRACAUDAL; PERINEURAL at 08:07

## 2018-07-06 RX ADMIN — MIDAZOLAM HYDROCHLORIDE 0.5 MG: 1 INJECTION, SOLUTION INTRAMUSCULAR; INTRAVENOUS at 09:30

## 2018-07-06 RX ADMIN — MIDAZOLAM HYDROCHLORIDE 2 MG: 1 INJECTION, SOLUTION INTRAMUSCULAR; INTRAVENOUS at 08:58

## 2018-07-06 RX ADMIN — SODIUM CHLORIDE, POTASSIUM CHLORIDE, SODIUM LACTATE AND CALCIUM CHLORIDE 9 ML/HR: 600; 310; 30; 20 INJECTION, SOLUTION INTRAVENOUS at 08:07

## 2018-07-06 RX ADMIN — MIDAZOLAM HYDROCHLORIDE 0.5 MG: 1 INJECTION, SOLUTION INTRAMUSCULAR; INTRAVENOUS at 09:43

## 2018-07-06 RX ADMIN — FENTANYL CITRATE 25 MCG: 50 INJECTION, SOLUTION INTRAMUSCULAR; INTRAVENOUS at 09:10

## 2018-07-06 RX ADMIN — ONDANSETRON 4 MG: 2 INJECTION, SOLUTION INTRAMUSCULAR; INTRAVENOUS at 08:58

## 2018-07-06 RX ADMIN — CEFAZOLIN SODIUM 2 G: 2 SOLUTION INTRAVENOUS at 09:06

## 2018-07-06 RX ADMIN — ONDANSETRON 4 MG: 2 INJECTION, SOLUTION INTRAMUSCULAR; INTRAVENOUS at 10:48

## 2018-07-06 RX ADMIN — MIDAZOLAM HYDROCHLORIDE 0.5 MG: 1 INJECTION, SOLUTION INTRAMUSCULAR; INTRAVENOUS at 09:07

## 2018-07-06 RX ADMIN — PROPOFOL 300 MG: 10 INJECTION, EMULSION INTRAVENOUS at 09:10

## 2018-07-06 RX ADMIN — MIDAZOLAM HYDROCHLORIDE 0.5 MG: 1 INJECTION, SOLUTION INTRAMUSCULAR; INTRAVENOUS at 09:13

## 2018-07-06 RX ADMIN — HYDROCORTISONE SODIUM SUCCINATE 50 MG: 100 INJECTION, POWDER, FOR SOLUTION INTRAMUSCULAR; INTRAVENOUS at 08:58

## 2018-07-06 RX ADMIN — FENTANYL CITRATE 25 MCG: 50 INJECTION, SOLUTION INTRAMUSCULAR; INTRAVENOUS at 09:19

## 2018-07-06 NOTE — ANESTHESIA POSTPROCEDURE EVALUATION
Patient: Rachel Srinivasan    Procedure Summary     Date:  07/06/18 Room / Location:   [facility] OR 2 /  [facility]    Anesthesia Start:  0901 Anesthesia Stop:  1004    Procedure:  INSERTION VENOUS ACCESS DEVICE (N/A ) Diagnosis:       Poor venous access      (Poor venous access [I87.8])    Surgeon:  Bo Thomas MD Provider:  Rukhsana Emery MD    Anesthesia Type:  MAC ASA Status:  3          Anesthesia Type: MAC  Last vitals  BP   124/87 (07/06/18 1043)   Temp   97.6 °F (36.4 °C) (07/06/18 1032)   Pulse   90 (07/06/18 1043)   Resp   16 (07/06/18 1043)     SpO2   99 % (07/06/18 1043)     Post Anesthesia Care and Evaluation      Comments: Pt discharged prior to anesthesia evaluation

## 2018-07-06 NOTE — OP NOTE
Preoperative diagnosis failure venous access  Postoperative diagnosis the same  Procedure PowerPort placement via right cephalic vein cutdown under fluoroscopy  Complications none  Drains none  Specimen none   blood loss 5 cc  Anesthesia IV sedation and 1% lidocaine without epinephrine locally  Surgeon Dr. Thomas  Findings usable cephalic vein  Procedure after satisfactory IV sedation the patient's right chest was prepped and draped sterile fashion.  1% lidocaine without epinephrine was locally of trach skin and subcutaneous tissue in the right deltopectoral groove.  Skin incision made carried out through skin and subcutaneous tissue.  Deltopectoral groove was delineated.  Cephalic vein was dissected out and appeared to be usable.  It was controlled proximally and distally between 2-0 silk Matthew ties.  6 Syrian PowerPort tubing having previously been flushed was passed into the vein, the vein had previously been nicked in the vein pick was utilized.  Fluoroscopy was utilized the tip of the catheter was positioned in the superior vena cava.  There was good bidirectional flow in the catheter was flushed with use normal saline.  The distal vein was tied off the catheter was tied into the proximal vein.  1% lidocaine without epinephrine locally infiltrated in the subcutaneous tissue for creation port pocket overlying the pectoral muscle.  Port pocket was created with use of electrocautery.  Hemostasis was assured.  Tubing was cut to length port tubing was reconstituted with port hub placing the locking mechanism.  The port was sutured to the chest wall ×3 with use of interrupted 0 Ethibond simple sutures.  Fluoroscopy was again utilized the port and tubing were seen in their entirety there is no evidence of kinking in the tip appeared to be in good position.  Hemostasis was assured.  All counts were correct.  Subcutaneous tissue was closed in interrupted simple fashion with use of 3-0 Vicryl.  Skin was closed with 4-0  Monocryl running subcuticular stitch burying the knots.  Port was accessed with a Perry needle through the skin and had good bidirectional flow was flushed with use of heparinized saline.  Wound was clean and dry and sterilely dressed.  The patient tolerated the procedure well was transferred to the recovery room in stable condition.  Chest x-ray is pending at the time of this dictation.  If chest x-rays without problems she will be transferred to second stage and subsequently  discharged home to follow-up in my office next week call for problems.  Routine port care she may shower 24 hours she was written a prescription for Percocet 5/325 one to 2 by mouth every 4 hours when necessary pain 30 these were dispensed without refills.  The port may be used.

## 2018-07-06 NOTE — ANESTHESIA PREPROCEDURE EVALUATION
Anesthesia Evaluation     Patient summary reviewed and Nursing notes reviewed   no history of anesthetic complications:  NPO Solid Status: > 8 hours  NPO Liquid Status: > 2 hours           Airway   Mallampati: III  TM distance: >3 FB  Neck ROM: full  Possible difficult intubation  Dental      Pulmonary     breath sounds clear to auscultation  (+) a smoker (QUIT 2014) Former, sleep apnea (HAS NOT DONE SLEEP STUDY BUT HAS REFERAL, SNORES ),   Cardiovascular   Exercise tolerance: good (4-7 METS)    Rhythm: regular  Rate: normal    (+) hypertension (? PT STATES OFF AND ON THINKS IT IS STRESS RELATED NOT TREATED AT THIS TIME),       Neuro/Psych  (+) headaches, weakness (GENERALIZED ), psychiatric history Depression,     GI/Hepatic/Renal/Endo    (+)  GERD well controlled,  renal disease stones,     Musculoskeletal (-) negative ROS    Abdominal    Substance History - negative use     OB/GYN          Other          Other Comment: AUTOIMMUNE DISORDER: 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CMS/HCC)    IVIG INFUSIONS   ROS/Med Hx Other: CL STOPPED AT 0530  Myositis                Anesthesia Plan    ASA 3     MAC     intravenous induction   Anesthetic plan and risks discussed with patient.  Use of blood products discussed with patient  Consented to blood products.

## 2018-07-06 NOTE — H&P (VIEW-ONLY)
PATIENT INFORMATION  Rachel Srinivasan  CONS PORT PLACEMENT, PT GETS IVIG INFUSIONS MONTHLY FOR 2 DAYS, PT STATES THEY HAVE BEEN HAVING ISSUES FINDING HER VEINS TO DO INFUSIONS SO THEY RECOMMENDED A PORT     - 1990    CHIEF COMPLAINT  Chief Complaint   Patient presents with   • Pre-op Exam       HISTORY OF PRESENT ILLNESS  HPI she has a history of myositis.  She receives infusions for this that are ongoing.  Her father had a port placed.  She is having increasing problems with her peripheral veins.  And a port has been recommended.        REVIEW OF SYSTEMS  Review of Systems chronic steroid use.  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 No pertinent family history.      SOCIAL HISTORY  Social History     Occupational History   • Not on file.     Social History Main Topics   • Smoking status: Former Smoker     Quit date: 3/13/2014   • Smokeless tobacco: Not on file   • Alcohol use No   • Drug use: No   • Sexual activity: Not on file         CURRENT MEDICATIONS    Current Outpatient Prescriptions:   •  azaTHIOprine (IMURAN) 50 MG tablet, , Disp: , Rfl:   •  Calcium Carb-Cholecalciferol (CALCIUM 600 + D) 600-200 MG-UNIT tablet, Take 1 tablet by mouth 2 (Two) Times a Day., Disp: , Rfl:   •  FLUoxetine (PROzac) 20 MG capsule, , Disp: , Rfl:   •  ibandronate (BONIVA) 150 MG tablet, , Disp: , Rfl:   •  methylPREDNISolone (MEDROL) 4 MG tablet, Take 4 mg by mouth Daily. Take 3 tablets daily, Disp: , Rfl:   •  metoclopramide (REGLAN) 10 MG tablet, Take 1 tablet by mouth 3 (Three) Times a Day As Needed (headache).,  "Disp: 20 tablet, Rfl: 0  •  mycophenolate (CELLCEPT) 500 MG tablet, , Disp: , Rfl:   •  omeprazole (priLOSEC) 40 MG capsule, , Disp: , Rfl:   •  ondansetron (ZOFRAN) 4 MG tablet, ondansetron HCl 4 mg tablet  WITH IVIG INFUSSION, Disp: , Rfl:   •  predniSONE (DELTASONE) 20 MG tablet, , Disp: , Rfl:   •  traMADol (ULTRAM) 50 MG tablet, Every 12 (Twelve) Hours., Disp: , Rfl:     ALLERGIES  Patient has no known allergies.    VITALS  Vitals:    06/27/18 1350   BP: 118/72   Weight: 71.2 kg (157 lb)   Height: 157.5 cm (62\")       LAST RESULTS   Admission on 03/13/2017, Discharged on 03/13/2017   Component Date Value Ref Range Status   • Glucose 03/13/2017 105* 65 - 99 mg/dL Final   • BUN 03/13/2017 12  6 - 20 mg/dL Final   • Creatinine 03/13/2017 0.77  0.57 - 1.00 mg/dL Final   • Sodium 03/13/2017 137  136 - 145 mmol/L Final   • Potassium 03/13/2017 3.1* 3.5 - 5.2 mmol/L Final   • Chloride 03/13/2017 100  98 - 107 mmol/L Final   • CO2 03/13/2017 22.9  22.0 - 29.0 mmol/L Final   • Calcium 03/13/2017 8.3* 8.6 - 10.5 mg/dL Final   • Total Protein 03/13/2017 9.2* 6.0 - 8.5 g/dL Final   • Albumin 03/13/2017 3.20* 3.50 - 5.20 g/dL Final   • ALT (SGPT) 03/13/2017 50* 5 - 33 U/L Final   • AST (SGOT) 03/13/2017 46* 5 - 32 U/L Final   • Alkaline Phosphatase 03/13/2017 49  40 - 129 U/L Final   • Total Bilirubin 03/13/2017 0.3  0.2 - 1.2 mg/dL Final   • eGFR Non African Amer 03/13/2017 89  >60 mL/min/1.73 Final   • Globulin 03/13/2017 6.0  gm/dL Final   • A/G Ratio 03/13/2017 0.5  g/dL Final   • BUN/Creatinine Ratio 03/13/2017 15.6  7.0 - 25.0 Final   • Anion Gap 03/13/2017 14.1  mmol/L Final   • Creatine Kinase 03/13/2017 620* 26 - 142 U/L Final   • WBC 03/13/2017 7.23  4.80 - 10.80 10*3/mm3 Final   • RBC 03/13/2017 3.31* 4.20 - 5.40 10*6/mm3 Final   • Hemoglobin 03/13/2017 9.3* 12.0 - 16.0 g/dL Final   • Hematocrit 03/13/2017 29.8* 37.0 - 47.0 % Final   • MCV 03/13/2017 90.0  81.0 - 99.0 fL Final   • MCH 03/13/2017 28.1  27.0 - 31.0 " pg Final   • MCHC 03/13/2017 31.2  31.0 - 37.0 g/dL Final   • RDW 03/13/2017 15.3* 11.5 - 14.5 % Final   • RDW-SD 03/13/2017 49.5  37.0 - 54.0 fl Final   • MPV 03/13/2017 10.6* 7.4 - 10.4 fL Final   • Platelets 03/13/2017 262  140 - 500 10*3/mm3 Final   • Neutrophil % 03/13/2017 83.0* 45.0 - 70.0 % Final   • Lymphocyte % 03/13/2017 13.6* 20.0 - 45.0 % Final   • Monocyte % 03/13/2017 2.6* 3.0 - 8.0 % Final   • Eosinophil % 03/13/2017 0.1  0.0 - 4.0 % Final   • Basophil % 03/13/2017 0.1  0.0 - 2.0 % Final   • Immature Grans % 03/13/2017 0.6* 0.0 - 0.5 % Final   • Neutrophils, Absolute 03/13/2017 6.00  1.50 - 8.30 10*3/mm3 Final   • Lymphocytes, Absolute 03/13/2017 0.98  0.60 - 4.80 10*3/mm3 Final   • Monocytes, Absolute 03/13/2017 0.19  0.00 - 1.00 10*3/mm3 Final   • Eosinophils, Absolute 03/13/2017 0.01* 0.10 - 0.30 10*3/mm3 Final   • Basophils, Absolute 03/13/2017 0.01  0.00 - 0.20 10*3/mm3 Final   • Immature Grans, Absolute 03/13/2017 0.04* 0.00 - 0.03 10*3/mm3 Final   • nRBC 03/13/2017 0.0  0.0 - 0.0 /100 WBC Final     No results found.    PHYSICAL EXAM  Physical Exam  So alert white female in no active distress.  She does have a moon-type face.  Her heart shows regular rate and rhythm her lungs are clear and equal.  She is oriented ×3.  She has very poor peripheral veins in her arms.  I reviewed her records from [facility].  ASSESSMENT    Failure of venous access    PLAN  The risks benefits and options were discussed with her in detail.  We will proceed with a PowerPort placement at [facility] on July 9.  We will give her preoperative antibiotic and a preoperative dose of hydrocortisone.

## 2018-07-07 NOTE — ADDENDUM NOTE
Addendum  created 07/06/18 2010 by Rukhsana Emery MD    Visit Navigator Flowsheet section accepted

## 2018-07-09 ENCOUNTER — OFFICE VISIT (OUTPATIENT)
Dept: SURGERY | Facility: CLINIC | Age: 28
End: 2018-07-09

## 2018-07-09 DIAGNOSIS — Z09 SURGICAL FOLLOW-UP CARE: Primary | ICD-10-CM

## 2018-07-09 PROCEDURE — 99024 POSTOP FOLLOW-UP VISIT: CPT | Performed by: SURGERY

## 2018-07-09 NOTE — PROGRESS NOTES
3 DAYS S/P PORT PLACEMENT, C/O SORENESS, BUT OTHERWISE WELL  She complains of some incisional soreness otherwise as well.  The port is easily palpable the incision is intact there is no signs of infection.  It may be used whenever it is needed he will need be flushed every 4 weeks if not being used.  I will see her back when necessary.

## 2018-10-15 ENCOUNTER — TRANSCRIBE ORDERS (OUTPATIENT)
Dept: ADMINISTRATIVE | Facility: HOSPITAL | Age: 28
End: 2018-10-15

## 2018-10-15 DIAGNOSIS — R76.8 OTHER SPECIFIED ABNORMAL IMMUNOLOGICAL FINDINGS IN SERUM: Primary | ICD-10-CM

## 2018-10-22 ENCOUNTER — HOSPITAL ENCOUNTER (OUTPATIENT)
Dept: PULMONOLOGY | Facility: HOSPITAL | Age: 28
Discharge: HOME | End: 2018-10-22
Admitting: NURSE PRACTITIONER

## 2018-10-22 PROCEDURE — 94726 PLETHYSMOGRAPHY LUNG VOLUMES: CPT

## 2018-10-22 PROCEDURE — 94729 DIFFUSING CAPACITY: CPT

## 2018-10-22 PROCEDURE — 94010 BREATHING CAPACITY TEST: CPT

## 2018-12-28 ENCOUNTER — APPOINTMENT (OUTPATIENT)
Dept: INFUSION THERAPY | Facility: HOSPITAL | Age: 28
End: 2018-12-28

## 2019-01-02 ENCOUNTER — HOSPITAL ENCOUNTER (EMERGENCY)
Facility: HOSPITAL | Age: 29
Discharge: HOME | End: 2019-01-02
Attending: EMERGENCY MEDICINE | Admitting: EMERGENCY MEDICINE

## 2019-01-02 ENCOUNTER — APPOINTMENT (OUTPATIENT)
Dept: CT IMAGING | Facility: HOSPITAL | Age: 29
End: 2019-01-02

## 2019-01-02 VITALS
BODY MASS INDEX: 30.21 KG/M2 | HEART RATE: 94 BPM | RESPIRATION RATE: 16 BRPM | DIASTOLIC BLOOD PRESSURE: 98 MMHG | HEIGHT: 61 IN | OXYGEN SATURATION: 98 % | TEMPERATURE: 98.8 F | WEIGHT: 160 LBS | SYSTOLIC BLOOD PRESSURE: 132 MMHG

## 2019-01-02 DIAGNOSIS — N23 URETERAL COLIC: Primary | ICD-10-CM

## 2019-01-02 DIAGNOSIS — N20.1 URETEROLITHIASIS: ICD-10-CM

## 2019-01-02 DIAGNOSIS — R10.9 ACUTE LEFT FLANK PAIN: ICD-10-CM

## 2019-01-02 LAB
ALBUMIN SERPL-MCNC: 3.9 G/DL (ref 3.5–5.2)
ALBUMIN/GLOB SERPL: 1.1 G/DL
ALP SERPL-CCNC: 122 U/L (ref 40–129)
ALT SERPL W P-5'-P-CCNC: 77 U/L (ref 5–33)
ANION GAP SERPL CALCULATED.3IONS-SCNC: 12 MMOL/L
AST SERPL-CCNC: 86 U/L (ref 5–32)
BASOPHILS # BLD AUTO: 0.03 10*3/MM3 (ref 0–0.2)
BASOPHILS NFR BLD AUTO: 0.2 % (ref 0–2)
BILIRUB SERPL-MCNC: 0.4 MG/DL (ref 0.2–1.2)
BILIRUB UR QL STRIP: ABNORMAL
BUN BLD-MCNC: 11 MG/DL (ref 6–20)
BUN/CREAT SERPL: 13.6 (ref 7–25)
CALCIUM SPEC-SCNC: 9.7 MG/DL (ref 8.6–10.5)
CHLORIDE SERPL-SCNC: 101 MMOL/L (ref 98–107)
CLARITY UR: CLEAR
CO2 SERPL-SCNC: 28 MMOL/L (ref 22–29)
COLOR UR: YELLOW
CREAT BLD-MCNC: 0.81 MG/DL (ref 0.57–1)
DEPRECATED RDW RBC AUTO: 50 FL (ref 37–54)
EOSINOPHIL # BLD AUTO: 0.01 10*3/MM3 (ref 0.1–0.3)
EOSINOPHIL NFR BLD AUTO: 0.1 % (ref 0–4)
ERYTHROCYTE [DISTWIDTH] IN BLOOD BY AUTOMATED COUNT: 15.9 % (ref 11.5–14.5)
GFR SERPL CREATININE-BSD FRML MDRD: 83 ML/MIN/1.73
GLOBULIN UR ELPH-MCNC: 3.6 GM/DL
GLUCOSE BLD-MCNC: 106 MG/DL (ref 65–99)
GLUCOSE UR STRIP-MCNC: NEGATIVE MG/DL
HCG SERPL QL: NEGATIVE
HCT VFR BLD AUTO: 38.3 % (ref 37–47)
HGB BLD-MCNC: 11.9 G/DL (ref 12–16)
HGB UR QL STRIP.AUTO: ABNORMAL
IMM GRANULOCYTES # BLD AUTO: 0.29 10*3/MM3 (ref 0–0.03)
IMM GRANULOCYTES NFR BLD AUTO: 1.6 % (ref 0–0.5)
KETONES UR QL STRIP: ABNORMAL
LEUKOCYTE ESTERASE UR QL STRIP.AUTO: NEGATIVE
LIPASE SERPL-CCNC: 41 U/L (ref 13–60)
LYMPHOCYTES # BLD AUTO: 0.92 10*3/MM3 (ref 0.6–4.8)
LYMPHOCYTES NFR BLD AUTO: 5.1 % (ref 20–45)
MCH RBC QN AUTO: 26.9 PG (ref 27–31)
MCHC RBC AUTO-ENTMCNC: 31.1 G/DL (ref 31–37)
MCV RBC AUTO: 86.5 FL (ref 81–99)
MONOCYTES # BLD AUTO: 1.12 10*3/MM3 (ref 0–1)
MONOCYTES NFR BLD AUTO: 6.2 % (ref 3–8)
NEUTROPHILS # BLD AUTO: 15.6 10*3/MM3 (ref 1.5–8.3)
NEUTROPHILS NFR BLD AUTO: 86.8 % (ref 45–70)
NITRITE UR QL STRIP: NEGATIVE
NRBC BLD AUTO-RTO: 0 /100 WBC (ref 0–0)
PH UR STRIP.AUTO: 6.5 [PH] (ref 4.5–8)
PLATELET # BLD AUTO: 204 10*3/MM3 (ref 140–500)
PMV BLD AUTO: 11.8 FL (ref 7.4–10.4)
POTASSIUM BLD-SCNC: 3.5 MMOL/L (ref 3.5–5.2)
PROT SERPL-MCNC: 7.5 G/DL (ref 6–8.5)
PROT UR QL STRIP: ABNORMAL
RBC # BLD AUTO: 4.43 10*6/MM3 (ref 4.2–5.4)
SODIUM BLD-SCNC: 141 MMOL/L (ref 136–145)
SP GR UR STRIP: 1.03 (ref 1–1.03)
UROBILINOGEN UR QL STRIP: ABNORMAL
WBC NRBC COR # BLD: 17.97 10*3/MM3 (ref 4.8–10.8)

## 2019-01-02 PROCEDURE — 96374 THER/PROPH/DIAG INJ IV PUSH: CPT

## 2019-01-02 PROCEDURE — 81003 URINALYSIS AUTO W/O SCOPE: CPT | Performed by: EMERGENCY MEDICINE

## 2019-01-02 PROCEDURE — 25010000002 HYDROMORPHONE PER 4 MG: Performed by: EMERGENCY MEDICINE

## 2019-01-02 PROCEDURE — 99284 EMERGENCY DEPT VISIT MOD MDM: CPT | Performed by: EMERGENCY MEDICINE

## 2019-01-02 PROCEDURE — 84703 CHORIONIC GONADOTROPIN ASSAY: CPT | Performed by: EMERGENCY MEDICINE

## 2019-01-02 PROCEDURE — 96375 TX/PRO/DX INJ NEW DRUG ADDON: CPT

## 2019-01-02 PROCEDURE — 99284 EMERGENCY DEPT VISIT MOD MDM: CPT

## 2019-01-02 PROCEDURE — 25010000002 FENTANYL CITRATE (PF) 100 MCG/2ML SOLUTION: Performed by: EMERGENCY MEDICINE

## 2019-01-02 PROCEDURE — 80053 COMPREHEN METABOLIC PANEL: CPT | Performed by: EMERGENCY MEDICINE

## 2019-01-02 PROCEDURE — 74176 CT ABD & PELVIS W/O CONTRAST: CPT

## 2019-01-02 PROCEDURE — 83690 ASSAY OF LIPASE: CPT | Performed by: EMERGENCY MEDICINE

## 2019-01-02 PROCEDURE — 25010000002 KETOROLAC TROMETHAMINE PER 15 MG: Performed by: EMERGENCY MEDICINE

## 2019-01-02 PROCEDURE — 85025 COMPLETE CBC W/AUTO DIFF WBC: CPT | Performed by: EMERGENCY MEDICINE

## 2019-01-02 PROCEDURE — 25010000002 PROMETHAZINE PER 50 MG: Performed by: EMERGENCY MEDICINE

## 2019-01-02 RX ORDER — PHENAZOPYRIDINE HYDROCHLORIDE 100 MG/1
200 TABLET, FILM COATED ORAL ONCE
Status: COMPLETED | OUTPATIENT
Start: 2019-01-02 | End: 2019-01-02

## 2019-01-02 RX ORDER — SODIUM CHLORIDE 0.9 % (FLUSH) 0.9 %
10 SYRINGE (ML) INJECTION AS NEEDED
Status: DISCONTINUED | OUTPATIENT
Start: 2019-01-02 | End: 2019-01-02 | Stop reason: HOSPADM

## 2019-01-02 RX ORDER — HYDROMORPHONE HCL 110MG/55ML
0.5 PATIENT CONTROLLED ANALGESIA SYRINGE INTRAVENOUS ONCE
Status: COMPLETED | OUTPATIENT
Start: 2019-01-02 | End: 2019-01-02

## 2019-01-02 RX ORDER — PROMETHAZINE HYDROCHLORIDE 25 MG/ML
12.5 INJECTION, SOLUTION INTRAMUSCULAR; INTRAVENOUS ONCE
Status: COMPLETED | OUTPATIENT
Start: 2019-01-02 | End: 2019-01-02

## 2019-01-02 RX ORDER — PHENAZOPYRIDINE HYDROCHLORIDE 200 MG/1
TABLET, FILM COATED ORAL
Qty: 9 TABLET | Refills: 0 | Status: SHIPPED | OUTPATIENT
Start: 2019-01-02 | End: 2019-08-23

## 2019-01-02 RX ORDER — KETOROLAC TROMETHAMINE 30 MG/ML
15 INJECTION, SOLUTION INTRAMUSCULAR; INTRAVENOUS ONCE
Status: COMPLETED | OUTPATIENT
Start: 2019-01-02 | End: 2019-01-02

## 2019-01-02 RX ORDER — GABAPENTIN 100 MG/1
100 CAPSULE ORAL NIGHTLY
COMMUNITY
End: 2019-08-23

## 2019-01-02 RX ORDER — FENTANYL CITRATE 50 UG/ML
100 INJECTION, SOLUTION INTRAMUSCULAR; INTRAVENOUS ONCE
Status: COMPLETED | OUTPATIENT
Start: 2019-01-02 | End: 2019-01-02

## 2019-01-02 RX ORDER — OXYCODONE HYDROCHLORIDE AND ACETAMINOPHEN 5; 325 MG/1; MG/1
TABLET ORAL
Qty: 15 TABLET | Refills: 0 | Status: SHIPPED | OUTPATIENT
Start: 2019-01-02 | End: 2019-04-08

## 2019-01-02 RX ORDER — OXYCODONE HYDROCHLORIDE AND ACETAMINOPHEN 5; 325 MG/1; MG/1
1 TABLET ORAL ONCE
Status: COMPLETED | OUTPATIENT
Start: 2019-01-02 | End: 2019-01-02

## 2019-01-02 RX ADMIN — OXYCODONE HYDROCHLORIDE AND ACETAMINOPHEN 1 TABLET: 5; 325 TABLET ORAL at 15:27

## 2019-01-02 RX ADMIN — KETOROLAC TROMETHAMINE 15 MG: 30 INJECTION, SOLUTION INTRAMUSCULAR at 12:49

## 2019-01-02 RX ADMIN — PROMETHAZINE HYDROCHLORIDE 12.5 MG: 25 INJECTION INTRAMUSCULAR; INTRAVENOUS at 12:50

## 2019-01-02 RX ADMIN — FENTANYL CITRATE 100 MCG: 50 INJECTION, SOLUTION INTRAMUSCULAR; INTRAVENOUS at 14:19

## 2019-01-02 RX ADMIN — PHENAZOPYRIDINE HYDROCHLORIDE 200 MG: 100 TABLET ORAL at 15:27

## 2019-01-02 RX ADMIN — HYDROMORPHONE HYDROCHLORIDE 0.5 MG: 2 INJECTION INTRAMUSCULAR; INTRAVENOUS; SUBCUTANEOUS at 12:51

## 2019-01-02 NOTE — ED PROVIDER NOTES
EMERGENCY DEPARTMENT ENCOUNTER      Room Number: 3/03      HPI:    Chief complaint: Flank and abdomen pain    Location: Left    Quality/Severity:  10 out of 10; sharp severe    Timing/Duration: Abrupt onset this morning, constant    Modifying Factors: No clearly identified    Associated Symptoms: Nausea no vomiting.  Normal bowel movement without black or bloody stool.  No dysuria or hematuria.  Currently on her menstrual cycle.  No fever.  No trauma.    Narrative: Pt is a 28 y.o. female who presents complaining of abrupt onset left flank pain now radiating into the left abdomen.  History of kidney stones are fairly remote.  Patient has a fairly complex medical history as well.  She is currently immunosuppressed.      PMD: Hang Jordan MD    REVIEW OF SYSTEMS  Review of Systems  All other systems reviewed and are otherwise negative as related chief complaint.    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   • Bronchitis 06/13/2014   • Depressive disorder 08/09/2017   • Myositis 01/30/2018   • Poor venous access 06/27/2018     Resolved Ambulatory Problems     Diagnosis Date Noted   • No Resolved Ambulatory Problems     Past Medical History:   Diagnosis Date   • 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CMS/HCC)    • Chronic sore throat    • GERD (gastroesophageal reflux disease)    • Migraine    • Myositis    • Shingles        PAST SURGICAL HISTORY  Past Surgical History:   Procedure Laterality Date   • ADENOIDECTOMY     • EAR TUBES     • EYE SURGERY Right     eyelid reconstruction   • SKIN BIOPSY      right thigh   • VENOUS ACCESS DEVICE (PORT) INSERTION N/A 7/6/2018    Procedure: INSERTION VENOUS ACCESS DEVICE;  Surgeon: Bo Thomas MD;  Location: [facility];  Service: General       FAMILY HISTORY  Family History   Problem Relation Age of Onset   • No Known Problems Mother    • Lung cancer Father        SOCIAL HISTORY  Social History      Socioeconomic History   • Marital status:      Spouse name: Not on file   • Number of children: Not on file   • Years of education: Not on file   • Highest education level: Not on file   Social Needs   • Financial resource strain: Not on file   • Food insecurity - worry: Not on file   • Food insecurity - inability: Not on file   • Transportation needs - medical: Not on file   • Transportation needs - non-medical: Not on file   Occupational History   • Not on file   Tobacco Use   • Smoking status: Former Smoker     Packs/day: 0.50     Years: 15.00     Pack years: 7.50     Types: Cigarettes     Last attempt to quit: 3/13/2014     Years since quittin.8   • Smokeless tobacco: Never Used   Substance and Sexual Activity   • Alcohol use: No   • Drug use: No   • Sexual activity: Defer   Other Topics Concern   • Not on file   Social History Narrative   • Not on file       ALLERGIES  Patient has no known allergies.    PHYSICAL EXAM  ED Triage Vitals [19 1218]   Temp Heart Rate Resp BP SpO2   97.8 °F (36.6 °C) 84 16 (!) 155/102 99 %      Temp src Heart Rate Source Patient Position BP Location FiO2 (%)   Oral Monitor -- -- --       Physical Exam   Constitutional: She is oriented to person, place, and time and well-developed, well-nourished, and in no distress. No distress.   HENT:   Head: Normocephalic.   Mucous membranes moist   Eyes: Conjunctivae are normal. No scleral icterus.   Neck:   Painless range of motion   Cardiovascular: Normal rate and regular rhythm.   Pulmonary/Chest: Effort normal and breath sounds normal. No respiratory distress.   Abdominal: Soft. There is no tenderness. There is no rebound and no guarding.   No CVA tenderness to percussion   Musculoskeletal:   Moves all extremities equally   Neurological: She is alert and oriented to person, place, and time.   Skin: Skin is warm and dry.   Psychiatric: Mood and affect normal.   Nursing note and vitals reviewed.      LAB RESULTS  Results  for orders placed or performed during the hospital encounter of 01/02/19   Comprehensive Metabolic Panel   Result Value Ref Range    Glucose 106 (H) 65 - 99 mg/dL    BUN 11 6 - 20 mg/dL    Creatinine 0.81 0.57 - 1.00 mg/dL    Sodium 141 136 - 145 mmol/L    Potassium 3.5 3.5 - 5.2 mmol/L    Chloride 101 98 - 107 mmol/L    CO2 28.0 22.0 - 29.0 mmol/L    Calcium 9.7 8.6 - 10.5 mg/dL    Total Protein 7.5 6.0 - 8.5 g/dL    Albumin 3.90 3.50 - 5.20 g/dL    ALT (SGPT) 77 (H) 5 - 33 U/L    AST (SGOT) 86 (H) 5 - 32 U/L    Alkaline Phosphatase 122 40 - 129 U/L    Total Bilirubin 0.4 0.2 - 1.2 mg/dL    eGFR Non African Amer 83 >60 mL/min/1.73    Globulin 3.6 gm/dL    A/G Ratio 1.1 g/dL    BUN/Creatinine Ratio 13.6 7.0 - 25.0    Anion Gap 12.0 mmol/L   Lipase   Result Value Ref Range    Lipase 41 13 - 60 U/L   hCG, Serum, Qualitative   Result Value Ref Range    HCG Qualitative Negative Negative   Urinalysis With Microscopic If Indicated (No Culture) - Urine, Clean Catch   Result Value Ref Range    Color, UA Yellow Yellow, Straw    Appearance, UA Clear Clear    pH, UA 6.5 4.5 - 8.0    Specific Gravity, UA 1.034 (H) 1.003 - 1.030    Glucose, UA Negative Negative    Ketones, UA Trace (A) Negative, 80 mg/dL (3+), >=160 mg/dL (4+)    Bilirubin, UA Small (1+) (A) Negative    Blood, UA Large (3+) (A) Negative    Protein, UA Trace (A) Negative    Leuk Esterase, UA Negative Negative    Nitrite, UA Negative Negative    Urobilinogen, UA 1.0 E.U./dL 0.2 - 1.0 E.U./dL   CBC Auto Differential   Result Value Ref Range    WBC 17.97 (H) 4.80 - 10.80 10*3/mm3    RBC 4.43 4.20 - 5.40 10*6/mm3    Hemoglobin 11.9 (L) 12.0 - 16.0 g/dL    Hematocrit 38.3 37.0 - 47.0 %    MCV 86.5 81.0 - 99.0 fL    MCH 26.9 (L) 27.0 - 31.0 pg    MCHC 31.1 31.0 - 37.0 g/dL    RDW 15.9 (H) 11.5 - 14.5 %    RDW-SD 50.0 37.0 - 54.0 fl    MPV 11.8 (H) 7.4 - 10.4 fL    Platelets 204 140 - 500 10*3/mm3    Neutrophil % 86.8 (H) 45.0 - 70.0 %    Lymphocyte % 5.1 (L) 20.0 -  45.0 %    Monocyte % 6.2 3.0 - 8.0 %    Eosinophil % 0.1 0.0 - 4.0 %    Basophil % 0.2 0.0 - 2.0 %    Immature Grans % 1.6 (H) 0.0 - 0.5 %    Neutrophils, Absolute 15.60 (H) 1.50 - 8.30 10*3/mm3    Lymphocytes, Absolute 0.92 0.60 - 4.80 10*3/mm3    Monocytes, Absolute 1.12 (H) 0.00 - 1.00 10*3/mm3    Eosinophils, Absolute 0.01 (L) 0.10 - 0.30 10*3/mm3    Basophils, Absolute 0.03 0.00 - 0.20 10*3/mm3    Immature Grans, Absolute 0.29 (H) 0.00 - 0.03 10*3/mm3    nRBC 0.0 0.0 - 0.0 /100 WBC         I ordered the above labs and reviewed the results    RADIOLOGY  Ct Abdomen Pelvis Without Contrast    Result Date: 1/2/2019  Narrative: CT SCAN OF THE ABDOMEN AND PELVIS WITHOUT CONTRAST 1/2/2019  HISTORY: Left flank pain beginning this morning. History of kidney stones 2 years ago. Evaluate for obstructing renal calculus.  TECHNIQUE: Spiral CT was performed through the abdomen and pelvis without oral or intravenous contrast administration using renal stone protocol. Radiation dose reduction techniques included automated exposure control or exposure modulation based on body size. Radiation audit for CT and nuclear cardiology exams in the last 12 months: 0.  ABDOMEN FINDINGS: There is mild left hydronephrosis and hydroureter but there is no obstructing renal or ureteral calculus. There is a 2 mm stone in the dependent left side of the bladder most likely representing a recently passed stone. There are nonobstructing renal stones bilaterally. The liver, spleen, pancreas, gallbladder and biliary tree and adrenal glands are normal.  PELVIS FINDINGS: The gut, mesenteric and liam structures are normal. There is no free fluid in the abdomen or pelvis. The lung bases are normal.      Impression: 1. No obstructing renal or ureteral calculus. There is mild left hydronephrosis and hydroureter. There is a 2 mm stone along the dependent left side of the bladder probably representing a recently passed stone. Clinical correlation is  recommended. 2. Nonobstructing renal stones bilaterally.  This report was finalized on 1/2/2019 1:34 PM by Dr. Rickey Soriano MD.        I ordered the above radiologic testing and reviewed the results    PROCEDURES  Procedures      PROGRESS AND CONSULTS  ED Course as of Jan 02 1512 Wed Jan 02, 2019   1432 Suspected diagnosis confirm.  Patient notes that she's had leukocytosis ever since starting her steroid pack per myositis.  She also notes that the myositis bumps her LFTs slightly and that this is not new.  She still continues to have a fair amount of pain after the Dilaudid and Toradol.  I'll try some fentanyl to relieve her discomfort this time.  Monitor for improvement.  [RS]   1455 Pain relief with fentanyl.  Resting much more comfortably now.  [RS]      ED Course User Index  [RS] Michael Cano MD           MEDICAL DECISION MAKING  Results were reviewed/discussed with the patient and they were also made aware of online access. Pt also made aware that some labs, such as cultures, will not be resulted during ER visit and follow up with PMD is necessary.     MDM     Josesito query complete. Treatment plan to include limited course of prescribed controlled substance.  Risks including addiction, tolerance, sedation, benefits and alternatives presented to patient.    DIAGNOSIS  Final diagnoses:   Ureteral colic   Ureterolithiasis   Acute left flank pain       Latest Documented Vital Signs:  As of 3:12 PM  BP- 137/98 HR- 78 Temp- 97.8 °F (36.6 °C) (Oral) O2 sat- 98%    DISPOSITION  D/c- stable       Medication List      New Prescriptions    oxyCODONE-acetaminophen 5-325 MG per tablet  Commonly known as:  PERCOCET  Take 1 tab by mouth every 6 hours as needed for pain     phenazopyridine 200 MG tablet  Commonly known as:  PYRIDIUM  Take 1 tablet by mouth 3 times daily or bladder spasms        Stop    traMADol 50 MG tablet  Commonly known as:  ULTRAM          Follow-up Information     Schedule an appointment as soon as  possible for a visit  with Hang Jordan MD.    Specialty:  Family Medicine  Why:  As needed  Contact information:  [address] [facility] [facility] [id number] [phone]             Schedule an appointment as soon as possible for a visit  with Patrick Cano MD.    Specialty:  Urology  Why:  As needed  Contact information:  [address] [place] [id number] [phone]                          Michael Cano MD  01/02/19 9850

## 2019-01-02 NOTE — DISCHARGE INSTRUCTIONS
Return to the emergency department with worsening symptoms, uncontrolled pain, inability to tolerate oral liquids, fever greater than 105° F not controlled by Tylenol and ibuprofen or as needed with emergent concerns.

## 2019-01-07 ENCOUNTER — HOSPITAL ENCOUNTER (OUTPATIENT)
Dept: INFUSION THERAPY | Facility: HOSPITAL | Age: 29
Discharge: HOME | End: 2019-01-07
Admitting: NURSE PRACTITIONER

## 2019-01-07 VITALS
SYSTOLIC BLOOD PRESSURE: 136 MMHG | HEIGHT: 61 IN | HEART RATE: 95 BPM | RESPIRATION RATE: 16 BRPM | DIASTOLIC BLOOD PRESSURE: 92 MMHG | WEIGHT: 160.05 LBS | OXYGEN SATURATION: 98 % | BODY MASS INDEX: 30.22 KG/M2

## 2019-01-07 DIAGNOSIS — M35.89 OTHER SPECIFIED SYSTEMIC INVOLVEMENT OF CONNECTIVE TISSUE (HCC): Primary | ICD-10-CM

## 2019-01-07 PROCEDURE — 96523 IRRIG DRUG DELIVERY DEVICE: CPT

## 2019-01-07 RX ADMIN — HEPARIN SODIUM (PORCINE) LOCK FLUSH IV SOLN 100 UNIT/ML 500 UNITS: 100 SOLUTION at 13:39

## 2019-01-24 ENCOUNTER — TRANSCRIBE ORDERS (OUTPATIENT)
Dept: ADMINISTRATIVE | Facility: HOSPITAL | Age: 29
End: 2019-01-24

## 2019-01-24 DIAGNOSIS — M60.9 MYOSITIS, UNSPECIFIED MYOSITIS TYPE, UNSPECIFIED SITE: ICD-10-CM

## 2019-01-24 DIAGNOSIS — C56.9 MALIGNANT NEOPLASM OF OVARY, UNSPECIFIED LATERALITY (HCC): Primary | ICD-10-CM

## 2019-01-30 ENCOUNTER — HOSPITAL ENCOUNTER (OUTPATIENT)
Dept: ULTRASOUND IMAGING | Facility: HOSPITAL | Age: 29
Discharge: HOME | End: 2019-01-30

## 2019-01-30 ENCOUNTER — HOSPITAL ENCOUNTER (OUTPATIENT)
Dept: CT IMAGING | Facility: HOSPITAL | Age: 29
Discharge: HOME | End: 2019-01-30
Admitting: INTERNAL MEDICINE

## 2019-01-30 ENCOUNTER — HOSPITAL ENCOUNTER (OUTPATIENT)
Dept: CT IMAGING | Facility: HOSPITAL | Age: 29
End: 2019-01-30

## 2019-01-30 ENCOUNTER — APPOINTMENT (OUTPATIENT)
Dept: ULTRASOUND IMAGING | Facility: HOSPITAL | Age: 29
End: 2019-01-30

## 2019-01-30 DIAGNOSIS — M60.9 MYOSITIS, UNSPECIFIED MYOSITIS TYPE, UNSPECIFIED SITE: ICD-10-CM

## 2019-01-30 DIAGNOSIS — C56.9 MALIGNANT NEOPLASM OF OVARY, UNSPECIFIED LATERALITY (HCC): ICD-10-CM

## 2019-01-30 DIAGNOSIS — Q50.39 OVARIAN ANOMALY: ICD-10-CM

## 2019-01-30 PROCEDURE — 76856 US EXAM PELVIC COMPLETE: CPT

## 2019-01-30 PROCEDURE — 71250 CT THORAX DX C-: CPT

## 2019-01-30 PROCEDURE — 76830 TRANSVAGINAL US NON-OB: CPT

## 2019-04-08 ENCOUNTER — HOSPITAL ENCOUNTER (OUTPATIENT)
Dept: INFUSION THERAPY | Facility: HOSPITAL | Age: 29
Discharge: HOME | End: 2019-04-08
Admitting: NURSE PRACTITIONER

## 2019-04-08 VITALS
RESPIRATION RATE: 16 BRPM | DIASTOLIC BLOOD PRESSURE: 104 MMHG | WEIGHT: 154 LBS | HEART RATE: 82 BPM | TEMPERATURE: 98.7 F | SYSTOLIC BLOOD PRESSURE: 139 MMHG | OXYGEN SATURATION: 99 % | BODY MASS INDEX: 29.07 KG/M2 | HEIGHT: 61 IN

## 2019-04-08 DIAGNOSIS — M35.89 OTHER SPECIFIED SYSTEMIC INVOLVEMENT OF CONNECTIVE TISSUE (HCC): Primary | ICD-10-CM

## 2019-04-08 PROCEDURE — 96523 IRRIG DRUG DELIVERY DEVICE: CPT

## 2019-04-08 RX ADMIN — HEPARIN SODIUM (PORCINE) LOCK FLUSH IV SOLN 100 UNIT/ML 500 UNITS: 100 SOLUTION at 09:13

## 2019-04-08 NOTE — NURSING NOTE
NURSING PROGRESS NOTE: Patient arrived to [facility] for Port maintenance.  Right upper chest Port accessed and flushed per Policy without incident.  Refer to flow sheet.  Declined AVS.  Discharged home at 0925.  OSMEL Landry

## 2019-05-20 ENCOUNTER — HOSPITAL ENCOUNTER (OUTPATIENT)
Dept: INFUSION THERAPY | Facility: HOSPITAL | Age: 29
Discharge: HOME | End: 2019-05-20
Admitting: NURSE PRACTITIONER

## 2019-05-20 VITALS
RESPIRATION RATE: 16 BRPM | DIASTOLIC BLOOD PRESSURE: 99 MMHG | HEIGHT: 61 IN | TEMPERATURE: 97.6 F | OXYGEN SATURATION: 98 % | SYSTOLIC BLOOD PRESSURE: 135 MMHG | WEIGHT: 160 LBS | HEART RATE: 118 BPM | BODY MASS INDEX: 30.21 KG/M2

## 2019-05-20 DIAGNOSIS — M35.89 OTHER SPECIFIED SYSTEMIC INVOLVEMENT OF CONNECTIVE TISSUE (HCC): Primary | ICD-10-CM

## 2019-05-20 PROCEDURE — 96523 IRRIG DRUG DELIVERY DEVICE: CPT

## 2019-05-20 RX ADMIN — HEPARIN SODIUM (PORCINE) LOCK FLUSH IV SOLN 100 UNIT/ML 500 UNITS: 100 SOLUTION at 09:25

## 2019-05-20 NOTE — NURSING NOTE
NURSING PROGRESS NOTE      Pt to ACC per self .Pt ID verified by (2) identifiers. Allergies, medications and medical history reviewed and verified. Tolerated prescribed treatment without incident.  Discharged to home.Condition satisfactory.  Prisca Matthew RN

## 2019-06-24 ENCOUNTER — HOSPITAL ENCOUNTER (OUTPATIENT)
Dept: INFUSION THERAPY | Facility: HOSPITAL | Age: 29
Discharge: HOME | End: 2019-06-24
Admitting: NURSE PRACTITIONER

## 2019-06-24 VITALS
HEART RATE: 97 BPM | OXYGEN SATURATION: 100 % | DIASTOLIC BLOOD PRESSURE: 94 MMHG | SYSTOLIC BLOOD PRESSURE: 129 MMHG | HEIGHT: 61 IN | TEMPERATURE: 98.1 F | WEIGHT: 155 LBS | RESPIRATION RATE: 16 BRPM | BODY MASS INDEX: 29.27 KG/M2

## 2019-06-24 DIAGNOSIS — D89.89 ANTISYNTHETASE SYNDROME (HCC): ICD-10-CM

## 2019-06-24 DIAGNOSIS — M35.89 OTHER SPECIFIED SYSTEMIC INVOLVEMENT OF CONNECTIVE TISSUE (HCC): Primary | ICD-10-CM

## 2019-06-24 PROCEDURE — 96523 IRRIG DRUG DELIVERY DEVICE: CPT

## 2019-06-24 RX ADMIN — HEPARIN SODIUM (PORCINE) LOCK FLUSH IV SOLN 100 UNIT/ML 500 UNITS: 100 SOLUTION at 10:04

## 2019-06-24 NOTE — NURSING NOTE
NURSING PROGRESS NOTE      0950 Pt to ACC per self .Pt ID verified by (2) identifiers. Allergies, medications and medical history reviewed and verified. Tolerated prescribed treatment without incident. Patient declined AVS, Pt verbalized understanding.  Discharged to home per self  @ 1015. Condition Satisfactory .  Prisca Matthew RN

## 2019-07-22 ENCOUNTER — HOSPITAL ENCOUNTER (OUTPATIENT)
Dept: INFUSION THERAPY | Facility: HOSPITAL | Age: 29
Discharge: HOME | End: 2019-07-22

## 2019-07-22 DIAGNOSIS — M35.89 OTHER SPECIFIED SYSTEMIC INVOLVEMENT OF CONNECTIVE TISSUE (HCC): Primary | ICD-10-CM

## 2019-07-24 ENCOUNTER — HOSPITAL ENCOUNTER (OUTPATIENT)
Dept: INFUSION THERAPY | Facility: HOSPITAL | Age: 29
Discharge: HOME | End: 2019-07-24
Admitting: NURSE PRACTITIONER

## 2019-07-24 VITALS
HEIGHT: 61 IN | WEIGHT: 154.98 LBS | HEART RATE: 108 BPM | TEMPERATURE: 98 F | SYSTOLIC BLOOD PRESSURE: 133 MMHG | DIASTOLIC BLOOD PRESSURE: 95 MMHG | RESPIRATION RATE: 16 BRPM | OXYGEN SATURATION: 99 % | BODY MASS INDEX: 29.26 KG/M2

## 2019-07-24 DIAGNOSIS — D89.89 ANTISYNTHETASE SYNDROME (HCC): Primary | ICD-10-CM

## 2019-07-24 PROCEDURE — 96523 IRRIG DRUG DELIVERY DEVICE: CPT

## 2019-07-24 RX ADMIN — HEPARIN SODIUM (PORCINE) LOCK FLUSH IV SOLN 100 UNIT/ML 500 UNITS: 100 SOLUTION at 10:05

## 2019-07-24 NOTE — NURSING NOTE
NURSING PROGRESS NOTE: Patient arrived to [facility] art 0850 for scheduled Port flush.  Right upper chest power port accessed per Policy and flushed per policy.  See flow sheet for documentation.  Tolerated procedure well.  Discharged home at 1010.  OSMEL Landry

## 2019-08-19 ENCOUNTER — HOSPITAL ENCOUNTER (OUTPATIENT)
Dept: INFUSION THERAPY | Facility: HOSPITAL | Age: 29
Discharge: HOME | End: 2019-08-19
Admitting: NURSE PRACTITIONER

## 2019-08-19 VITALS
HEART RATE: 94 BPM | BODY MASS INDEX: 28.32 KG/M2 | SYSTOLIC BLOOD PRESSURE: 131 MMHG | OXYGEN SATURATION: 98 % | DIASTOLIC BLOOD PRESSURE: 95 MMHG | HEIGHT: 61 IN | WEIGHT: 150 LBS | TEMPERATURE: 97.9 F | RESPIRATION RATE: 16 BRPM

## 2019-08-19 DIAGNOSIS — D89.89 ANTISYNTHETASE SYNDROME (HCC): Primary | ICD-10-CM

## 2019-08-19 PROCEDURE — 96523 IRRIG DRUG DELIVERY DEVICE: CPT

## 2019-08-19 RX ADMIN — HEPARIN SODIUM (PORCINE) LOCK FLUSH IV SOLN 100 UNIT/ML 500 UNITS: 100 SOLUTION at 10:25

## 2019-08-19 NOTE — NURSING NOTE
NURSING PROGRESS NOTE      1000 Pt to ACC per ambulatory.Pt ID verified by (2) identifiers. Allergies, medications and medical history reviewed and verified.  Port flush. Tolerated prescribed treatment without incident. Patient declined. AVS, Pt verbalized understanding.  Discharged to home per self with spouse @ 1040 Condition Satisfactory .  Prisca Matthew RN

## 2019-10-22 ENCOUNTER — APPOINTMENT (OUTPATIENT)
Dept: INFUSION THERAPY | Facility: HOSPITAL | Age: 29
End: 2019-10-22

## 2019-11-04 ENCOUNTER — HOSPITAL ENCOUNTER (OUTPATIENT)
Dept: INFUSION THERAPY | Facility: HOSPITAL | Age: 29
Discharge: HOME | End: 2019-11-04

## 2019-11-04 DIAGNOSIS — Z45.2 ENCOUNTER FOR CARE RELATED TO VASCULAR ACCESS PORT: ICD-10-CM

## 2019-11-04 DIAGNOSIS — D89.89 ANTISYNTHETASE SYNDROME (HCC): Primary | ICD-10-CM

## 2019-11-11 ENCOUNTER — HOSPITAL ENCOUNTER (OUTPATIENT)
Dept: INFUSION THERAPY | Facility: HOSPITAL | Age: 29
Discharge: HOME | End: 2019-11-11
Admitting: NURSE PRACTITIONER

## 2019-11-11 VITALS
WEIGHT: 150 LBS | DIASTOLIC BLOOD PRESSURE: 95 MMHG | HEART RATE: 116 BPM | BODY MASS INDEX: 27.44 KG/M2 | SYSTOLIC BLOOD PRESSURE: 130 MMHG | TEMPERATURE: 97 F | OXYGEN SATURATION: 99 % | RESPIRATION RATE: 16 BRPM

## 2019-11-11 DIAGNOSIS — D89.89 ANTISYNTHETASE SYNDROME (HCC): ICD-10-CM

## 2019-11-11 DIAGNOSIS — Z45.2 ENCOUNTER FOR CARE RELATED TO VASCULAR ACCESS PORT: Primary | ICD-10-CM

## 2019-11-11 PROCEDURE — 96523 IRRIG DRUG DELIVERY DEVICE: CPT

## 2019-11-11 PROCEDURE — 25010000003 HEPARIN LOCK FLUCH PER 10 UNITS

## 2019-11-11 RX ADMIN — HEPARIN SODIUM (PORCINE) LOCK FLUSH IV SOLN 100 UNIT/ML 500 UNITS: 100 SOLUTION at 12:26

## 2019-11-11 NOTE — NURSING NOTE
Patient arrived ambulatory to [facility] at 1205.  VSS.  Port accessed and flushed and   de accessed as ordered.  Patient refused AVS.  Patient denies complaints.  Discharged from [facility] ambulatory at 1233.

## 2019-11-11 NOTE — PATIENT INSTRUCTIONS
"  Call Dr. Hang Jordan [facility] at [phone] if you have any problems or concerns.    We know you have a Choice in healthcare and appreciate you using [facility].  Our purpose is to provide you \"Excellent Care\".  We hope that you will always choose us in the future and continue to recommend us to your family and friends.              "

## 2019-12-09 ENCOUNTER — HOSPITAL ENCOUNTER (OUTPATIENT)
Dept: INFUSION THERAPY | Facility: HOSPITAL | Age: 29
Discharge: HOME | End: 2019-12-09

## 2019-12-09 DIAGNOSIS — Z45.2 ENCOUNTER FOR CARE RELATED TO VASCULAR ACCESS PORT: Primary | ICD-10-CM

## 2019-12-17 ENCOUNTER — HOSPITAL ENCOUNTER (OUTPATIENT)
Dept: INFUSION THERAPY | Facility: HOSPITAL | Age: 29
Discharge: HOME | End: 2019-12-17
Admitting: NURSE PRACTITIONER

## 2019-12-17 ENCOUNTER — OFFICE VISIT (OUTPATIENT)
Dept: SURGERY | Facility: CLINIC | Age: 29
End: 2019-12-17

## 2019-12-17 VITALS
RESPIRATION RATE: 16 BRPM | WEIGHT: 147.05 LBS | HEIGHT: 62 IN | HEART RATE: 87 BPM | OXYGEN SATURATION: 100 % | BODY MASS INDEX: 27.06 KG/M2 | SYSTOLIC BLOOD PRESSURE: 124 MMHG | TEMPERATURE: 98.1 F | DIASTOLIC BLOOD PRESSURE: 92 MMHG

## 2019-12-17 VITALS
HEIGHT: 62 IN | DIASTOLIC BLOOD PRESSURE: 72 MMHG | BODY MASS INDEX: 27.05 KG/M2 | WEIGHT: 147 LBS | SYSTOLIC BLOOD PRESSURE: 132 MMHG | RESPIRATION RATE: 18 BRPM

## 2019-12-17 DIAGNOSIS — Z45.2 ENCOUNTER FOR CARE RELATED TO VASCULAR ACCESS PORT: Primary | ICD-10-CM

## 2019-12-17 DIAGNOSIS — D89.89 ANTISYNTHETASE SYNDROME (HCC): ICD-10-CM

## 2019-12-17 DIAGNOSIS — Z95.828 PORT-A-CATH IN PLACE: Primary | ICD-10-CM

## 2019-12-17 PROCEDURE — 99212 OFFICE O/P EST SF 10 MIN: CPT | Performed by: SURGERY

## 2019-12-17 PROCEDURE — 96523 IRRIG DRUG DELIVERY DEVICE: CPT

## 2019-12-17 RX ORDER — FLUTICASONE PROPIONATE 50 MCG
2 SPRAY, SUSPENSION (ML) NASAL DAILY PRN
COMMUNITY
End: 2020-12-05

## 2019-12-17 RX ADMIN — HEPARIN SODIUM (PORCINE) LOCK FLUSH IV SOLN 100 UNIT/ML 500 UNITS: 100 SOLUTION at 10:22

## 2019-12-17 NOTE — NURSING NOTE
NURSING PROGRESS NOTE      1000 Pt to ACC per SELF .Pt ID verified by (2) identifiers. Allergies, medications and medical history reviewed and verified. Pt had port flushed today, will be having it removed in January.  Tolerated prescribed treatment without incident. Patient declined  AVS, Pt verbalized understanding.  Discharged to home. Condition Satisfactory .  Prisca Matthew RN

## 2019-12-17 NOTE — PATIENT INSTRUCTIONS
"  Call Dr. Hang Jordan [facility] at [phone] if you have any problems or concerns.    We know you have a Choice in healthcare and appreciate you using [facility].  Our purpose is to provide you \"Excellent Care\".  We hope that you will always choose us in the future and continue to recommend us to your family and friends.              "

## 2019-12-17 NOTE — H&P
PATIENT INFORMATION  Rachel Srinivasan       - 1990    CHIEF COMPLAINT  Chief Complaint   Patient presents with   • Follow-up   she is here to discuss removing the port    HISTORY OF PRESENT ILLNESS  HPI  She is without complaints.  She says the port has worked without problems but she is no longer getting the monthly infusions.  Her infusions now are every 6 months and the port has not been used for 6 months.  She can actually get that through peripheral vein so she no longer has any need for the long-term venous access.      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]   • Myositis [M60.9]   • Depressive disorder [F32.9]   • Bronchitis [J40]         PAST MEDICAL HISTORY  Past Medical History:   Diagnosis Date   • 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CMS/HCC)    • Chronic sore throat     advised by PCP to have sleep study   • GERD (gastroesophageal reflux disease)     d/t steroids   • Migraine    • Myositis     monthly IVIG   • Shingles          SURGICAL HISTORY  Past Surgical History:   Procedure Laterality Date   • ADENOIDECTOMY     • EAR TUBES     • EYE SURGERY Right     eyelid reconstruction   • SKIN BIOPSY      right thigh   • VENOUS ACCESS DEVICE (PORT) INSERTION N/A 2018    Procedure: INSERTION VENOUS ACCESS DEVICE;  Surgeon: Bo Thomas MD;  Location: [facility];  Service: General         FAMILY HISTORY  Family History   Problem Relation Age of Onset   • No Known Problems Mother    • Lung cancer Father          SOCIAL HISTORY  Social History     Occupational History   • Not on file   Tobacco Use   • Smoking status: Former Smoker     Packs/day: 0.50     Years: 15.00     Pack years: 7.50     Types: Cigarettes     Last attempt to quit: 3/13/2014     Years since quittin.7   • Smokeless tobacco: Never Used   Substance and Sexual  "Activity   • Alcohol use: No   • Drug use: No   • Sexual activity: Defer       Debilities/Disabilities Identified: None    Emotional Behavior: Appropriate    CURRENT MEDICATIONS    Current Outpatient Medications:   •  azaTHIOprine (IMURAN) 50 MG tablet, Take 150 mg by mouth Daily., Disp: , Rfl:   •  Calcium Carb-Cholecalciferol (CALCIUM 600 + D) 600-200 MG-UNIT tablet, Take 1 tablet by mouth 2 (Two) Times a Day., Disp: , Rfl:   •  fluticasone (FLONASE) 50 MCG/ACT nasal spray, 2 sprays into the nostril(s) as directed by provider Daily., Disp: , Rfl:   •  methylPREDNISolone (MEDROL) 4 MG tablet, Take 8 mg by mouth Daily., Disp: , Rfl:   •  mycophenolate (CELLCEPT) 500 MG tablet, Take 500 mg by mouth Every 12 (Twelve) Hours., Disp: , Rfl:   •  nystatin (MYCOSTATIN) 585456 UNIT/ML suspension, nystatin 100,000 unit/mL oral suspension, Disp: , Rfl:   •  riTUXimab (RITUXAN IV), Infuse  into a venous catheter., Disp: , Rfl:     ALLERGIES  Patient has no known allergies.    VITALS  Vitals:    12/17/19 0847   BP: 132/72   Resp: 18   Weight: 66.7 kg (147 lb)   Height: 157.5 cm (62\")       LAST RESULTS   Admission on 01/02/2019, Discharged on 01/02/2019   Component Date Value Ref Range Status   • Glucose 01/02/2019 106* 65 - 99 mg/dL Final   • BUN 01/02/2019 11  6 - 20 mg/dL Final   • Creatinine 01/02/2019 0.81  0.57 - 1.00 mg/dL Final   • Sodium 01/02/2019 141  136 - 145 mmol/L Final   • Potassium 01/02/2019 3.5  3.5 - 5.2 mmol/L Final   • Chloride 01/02/2019 101  98 - 107 mmol/L Final   • CO2 01/02/2019 28.0  22.0 - 29.0 mmol/L Final   • Calcium 01/02/2019 9.7  8.6 - 10.5 mg/dL Final   • Total Protein 01/02/2019 7.5  6.0 - 8.5 g/dL Final   • Albumin 01/02/2019 3.90  3.50 - 5.20 g/dL Final   • ALT (SGPT) 01/02/2019 77* 5 - 33 U/L Final   • AST (SGOT) 01/02/2019 86* 5 - 32 U/L Final   • Alkaline Phosphatase 01/02/2019 122  40 - 129 U/L Final   • Total Bilirubin 01/02/2019 0.4  0.2 - 1.2 mg/dL Final   • eGFR Non  Amer " 01/02/2019 83  >60 mL/min/1.73 Final   • Globulin 01/02/2019 3.6  gm/dL Final   • A/G Ratio 01/02/2019 1.1  g/dL Final   • BUN/Creatinine Ratio 01/02/2019 13.6  7.0 - 25.0 Final   • Anion Gap 01/02/2019 12.0  mmol/L Final   • Lipase 01/02/2019 41  13 - 60 U/L Final   • HCG Qualitative 01/02/2019 Negative  Negative Final   • Color, UA 01/02/2019 Yellow  Yellow, Straw Final   • Appearance, UA 01/02/2019 Clear  Clear Final   • pH, UA 01/02/2019 6.5  4.5 - 8.0 Final   • Specific Gravity, UA 01/02/2019 1.034* 1.003 - 1.030 Final    Result obtained by Refractometer   • Glucose, UA 01/02/2019 Negative  Negative Final   • Ketones, UA 01/02/2019 Trace* Negative, 80 mg/dL (3+), >=160 mg/dL (4+) Final   • Bilirubin, UA 01/02/2019 Small (1+)* Negative Final   • Blood, UA 01/02/2019 Large (3+)* Negative Final   • Protein, UA 01/02/2019 Trace* Negative Final   • Leuk Esterase, UA 01/02/2019 Negative  Negative Final   • Nitrite, UA 01/02/2019 Negative  Negative Final   • Urobilinogen, UA 01/02/2019 1.0 E.U./dL  0.2 - 1.0 E.U./dL Final   • WBC 01/02/2019 17.97* 4.80 - 10.80 10*3/mm3 Final   • RBC 01/02/2019 4.43  4.20 - 5.40 10*6/mm3 Final   • Hemoglobin 01/02/2019 11.9* 12.0 - 16.0 g/dL Final   • Hematocrit 01/02/2019 38.3  37.0 - 47.0 % Final   • MCV 01/02/2019 86.5  81.0 - 99.0 fL Final   • MCH 01/02/2019 26.9* 27.0 - 31.0 pg Final   • MCHC 01/02/2019 31.1  31.0 - 37.0 g/dL Final   • RDW 01/02/2019 15.9* 11.5 - 14.5 % Final   • RDW-SD 01/02/2019 50.0  37.0 - 54.0 fl Final   • MPV 01/02/2019 11.8* 7.4 - 10.4 fL Final   • Platelets 01/02/2019 204  140 - 500 10*3/mm3 Final   • Neutrophil % 01/02/2019 86.8* 45.0 - 70.0 % Final   • Lymphocyte % 01/02/2019 5.1* 20.0 - 45.0 % Final   • Monocyte % 01/02/2019 6.2  3.0 - 8.0 % Final   • Eosinophil % 01/02/2019 0.1  0.0 - 4.0 % Final   • Basophil % 01/02/2019 0.2  0.0 - 2.0 % Final   • Immature Grans % 01/02/2019 1.6* 0.0 - 0.5 % Final   • Neutrophils, Absolute 01/02/2019 15.60* 1.50 -  8.30 10*3/mm3 Final   • Lymphocytes, Absolute 01/02/2019 0.92  0.60 - 4.80 10*3/mm3 Final   • Monocytes, Absolute 01/02/2019 1.12* 0.00 - 1.00 10*3/mm3 Final   • Eosinophils, Absolute 01/02/2019 0.01* 0.10 - 0.30 10*3/mm3 Final   • Basophils, Absolute 01/02/2019 0.03  0.00 - 0.20 10*3/mm3 Final   • Immature Grans, Absolute 01/02/2019 0.29* 0.00 - 0.03 10*3/mm3 Final   • nRBC 01/02/2019 0.0  0.0 - 0.0 /100 WBC Final     No results found.    PHYSICAL EXAM  Physical Exam alert white female in no active distress.  She is oriented x3 her gait is normal her heart shows a regular rate and rhythm her lungs are clear and equal her port is easily palpable on her right chest.  There is no evidence of infection.    ASSESSMENT port in place      PLAN  The risk benefits and options were discussed with her in detail.  We will proceed with remove the port at [facility] on January 17.  She is on daily steroids so we will give her a preoperative dose of hydrocortisone.

## 2019-12-17 NOTE — PROGRESS NOTES
PATIENT INFORMATION  Rachel Srinivasan       - 1990    CHIEF COMPLAINT  Chief Complaint   Patient presents with   • Follow-up   she is here to discuss removing the port    HISTORY OF PRESENT ILLNESS  HPI  She is without complaints.  She says the port has worked without problems but she is no longer getting the monthly infusions.  Her infusions now are every 6 months and the port has not been used for 6 months.  She can actually get that through peripheral vein so she no longer has any need for the long-term venous access.      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]   • Myositis [M60.9]   • Depressive disorder [F32.9]   • Bronchitis [J40]         PAST MEDICAL HISTORY  Past Medical History:   Diagnosis Date   • 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CMS/HCC)    • Chronic sore throat     advised by PCP to have sleep study   • GERD (gastroesophageal reflux disease)     d/t steroids   • Migraine    • Myositis     monthly IVIG   • Shingles          SURGICAL HISTORY  Past Surgical History:   Procedure Laterality Date   • ADENOIDECTOMY     • EAR TUBES     • EYE SURGERY Right     eyelid reconstruction   • SKIN BIOPSY      right thigh   • VENOUS ACCESS DEVICE (PORT) INSERTION N/A 2018    Procedure: INSERTION VENOUS ACCESS DEVICE;  Surgeon: Bo Thomas MD;  Location: [facility];  Service: General         FAMILY HISTORY  Family History   Problem Relation Age of Onset   • No Known Problems Mother    • Lung cancer Father          SOCIAL HISTORY  Social History     Occupational History   • Not on file   Tobacco Use   • Smoking status: Former Smoker     Packs/day: 0.50     Years: 15.00     Pack years: 7.50     Types: Cigarettes     Last attempt to quit: 3/13/2014     Years since quittin.7   • Smokeless tobacco: Never Used   Substance and Sexual  "Activity   • Alcohol use: No   • Drug use: No   • Sexual activity: Defer       Debilities/Disabilities Identified: None    Emotional Behavior: Appropriate    CURRENT MEDICATIONS    Current Outpatient Medications:   •  azaTHIOprine (IMURAN) 50 MG tablet, Take 150 mg by mouth Daily., Disp: , Rfl:   •  Calcium Carb-Cholecalciferol (CALCIUM 600 + D) 600-200 MG-UNIT tablet, Take 1 tablet by mouth 2 (Two) Times a Day., Disp: , Rfl:   •  fluticasone (FLONASE) 50 MCG/ACT nasal spray, 2 sprays into the nostril(s) as directed by provider Daily., Disp: , Rfl:   •  methylPREDNISolone (MEDROL) 4 MG tablet, Take 8 mg by mouth Daily., Disp: , Rfl:   •  mycophenolate (CELLCEPT) 500 MG tablet, Take 500 mg by mouth Every 12 (Twelve) Hours., Disp: , Rfl:   •  nystatin (MYCOSTATIN) 595061 UNIT/ML suspension, nystatin 100,000 unit/mL oral suspension, Disp: , Rfl:   •  riTUXimab (RITUXAN IV), Infuse  into a venous catheter., Disp: , Rfl:     ALLERGIES  Patient has no known allergies.    VITALS  Vitals:    12/17/19 0847   BP: 132/72   Resp: 18   Weight: 66.7 kg (147 lb)   Height: 157.5 cm (62\")       LAST RESULTS   Admission on 01/02/2019, Discharged on 01/02/2019   Component Date Value Ref Range Status   • Glucose 01/02/2019 106* 65 - 99 mg/dL Final   • BUN 01/02/2019 11  6 - 20 mg/dL Final   • Creatinine 01/02/2019 0.81  0.57 - 1.00 mg/dL Final   • Sodium 01/02/2019 141  136 - 145 mmol/L Final   • Potassium 01/02/2019 3.5  3.5 - 5.2 mmol/L Final   • Chloride 01/02/2019 101  98 - 107 mmol/L Final   • CO2 01/02/2019 28.0  22.0 - 29.0 mmol/L Final   • Calcium 01/02/2019 9.7  8.6 - 10.5 mg/dL Final   • Total Protein 01/02/2019 7.5  6.0 - 8.5 g/dL Final   • Albumin 01/02/2019 3.90  3.50 - 5.20 g/dL Final   • ALT (SGPT) 01/02/2019 77* 5 - 33 U/L Final   • AST (SGOT) 01/02/2019 86* 5 - 32 U/L Final   • Alkaline Phosphatase 01/02/2019 122  40 - 129 U/L Final   • Total Bilirubin 01/02/2019 0.4  0.2 - 1.2 mg/dL Final   • eGFR Non  Amer " 01/02/2019 83  >60 mL/min/1.73 Final   • Globulin 01/02/2019 3.6  gm/dL Final   • A/G Ratio 01/02/2019 1.1  g/dL Final   • BUN/Creatinine Ratio 01/02/2019 13.6  7.0 - 25.0 Final   • Anion Gap 01/02/2019 12.0  mmol/L Final   • Lipase 01/02/2019 41  13 - 60 U/L Final   • HCG Qualitative 01/02/2019 Negative  Negative Final   • Color, UA 01/02/2019 Yellow  Yellow, Straw Final   • Appearance, UA 01/02/2019 Clear  Clear Final   • pH, UA 01/02/2019 6.5  4.5 - 8.0 Final   • Specific Gravity, UA 01/02/2019 1.034* 1.003 - 1.030 Final    Result obtained by Refractometer   • Glucose, UA 01/02/2019 Negative  Negative Final   • Ketones, UA 01/02/2019 Trace* Negative, 80 mg/dL (3+), >=160 mg/dL (4+) Final   • Bilirubin, UA 01/02/2019 Small (1+)* Negative Final   • Blood, UA 01/02/2019 Large (3+)* Negative Final   • Protein, UA 01/02/2019 Trace* Negative Final   • Leuk Esterase, UA 01/02/2019 Negative  Negative Final   • Nitrite, UA 01/02/2019 Negative  Negative Final   • Urobilinogen, UA 01/02/2019 1.0 E.U./dL  0.2 - 1.0 E.U./dL Final   • WBC 01/02/2019 17.97* 4.80 - 10.80 10*3/mm3 Final   • RBC 01/02/2019 4.43  4.20 - 5.40 10*6/mm3 Final   • Hemoglobin 01/02/2019 11.9* 12.0 - 16.0 g/dL Final   • Hematocrit 01/02/2019 38.3  37.0 - 47.0 % Final   • MCV 01/02/2019 86.5  81.0 - 99.0 fL Final   • MCH 01/02/2019 26.9* 27.0 - 31.0 pg Final   • MCHC 01/02/2019 31.1  31.0 - 37.0 g/dL Final   • RDW 01/02/2019 15.9* 11.5 - 14.5 % Final   • RDW-SD 01/02/2019 50.0  37.0 - 54.0 fl Final   • MPV 01/02/2019 11.8* 7.4 - 10.4 fL Final   • Platelets 01/02/2019 204  140 - 500 10*3/mm3 Final   • Neutrophil % 01/02/2019 86.8* 45.0 - 70.0 % Final   • Lymphocyte % 01/02/2019 5.1* 20.0 - 45.0 % Final   • Monocyte % 01/02/2019 6.2  3.0 - 8.0 % Final   • Eosinophil % 01/02/2019 0.1  0.0 - 4.0 % Final   • Basophil % 01/02/2019 0.2  0.0 - 2.0 % Final   • Immature Grans % 01/02/2019 1.6* 0.0 - 0.5 % Final   • Neutrophils, Absolute 01/02/2019 15.60* 1.50 -  8.30 10*3/mm3 Final   • Lymphocytes, Absolute 01/02/2019 0.92  0.60 - 4.80 10*3/mm3 Final   • Monocytes, Absolute 01/02/2019 1.12* 0.00 - 1.00 10*3/mm3 Final   • Eosinophils, Absolute 01/02/2019 0.01* 0.10 - 0.30 10*3/mm3 Final   • Basophils, Absolute 01/02/2019 0.03  0.00 - 0.20 10*3/mm3 Final   • Immature Grans, Absolute 01/02/2019 0.29* 0.00 - 0.03 10*3/mm3 Final   • nRBC 01/02/2019 0.0  0.0 - 0.0 /100 WBC Final     No results found.    PHYSICAL EXAM  Physical Exam alert white female in no active distress.  She is oriented x3 her gait is normal her heart shows a regular rate and rhythm her lungs are clear and equal her port is easily palpable on her right chest.  There is no evidence of infection.    ASSESSMENT port in place      PLAN  The risk benefits and options were discussed with her in detail.  We will proceed with remove the port at [facility] on January 17.  She is on daily steroids so we will give her a preoperative dose of hydrocortisone.

## 2020-01-06 ENCOUNTER — APPOINTMENT (OUTPATIENT)
Dept: PREADMISSION TESTING | Facility: HOSPITAL | Age: 30
End: 2020-01-06

## 2020-01-06 VITALS
OXYGEN SATURATION: 100 % | WEIGHT: 146.9 LBS | SYSTOLIC BLOOD PRESSURE: 132 MMHG | RESPIRATION RATE: 16 BRPM | HEIGHT: 62 IN | BODY MASS INDEX: 27.03 KG/M2 | HEART RATE: 98 BPM | DIASTOLIC BLOOD PRESSURE: 89 MMHG

## 2020-01-06 DIAGNOSIS — Z95.828 PORT-A-CATH IN PLACE: ICD-10-CM

## 2020-01-06 LAB
DEPRECATED RDW RBC AUTO: 52.5 FL (ref 37–54)
ERYTHROCYTE [DISTWIDTH] IN BLOOD BY AUTOMATED COUNT: 17.5 % (ref 12.3–15.4)
HCT VFR BLD AUTO: 33.3 % (ref 34–46.6)
HGB BLD-MCNC: 9.4 G/DL (ref 12–15.9)
MCH RBC QN AUTO: 23.6 PG (ref 26.6–33)
MCHC RBC AUTO-ENTMCNC: 28.2 G/DL (ref 31.5–35.7)
MCV RBC AUTO: 83.5 FL (ref 79–97)
PLATELET # BLD AUTO: 195 10*3/MM3 (ref 140–450)
PMV BLD AUTO: 10.5 FL (ref 6–12)
RBC # BLD AUTO: 3.99 10*6/MM3 (ref 3.77–5.28)
WBC NRBC COR # BLD: 12.86 10*3/MM3 (ref 3.4–10.8)

## 2020-01-06 PROCEDURE — 36415 COLL VENOUS BLD VENIPUNCTURE: CPT

## 2020-01-06 PROCEDURE — 85027 COMPLETE CBC AUTOMATED: CPT | Performed by: SURGERY

## 2020-01-06 RX ORDER — ZOLPIDEM TARTRATE 5 MG/1
5 TABLET ORAL NIGHTLY PRN
COMMUNITY
End: 2020-12-05

## 2020-01-06 NOTE — DISCHARGE INSTRUCTIONS
To stop clears/gatorade @ 4:30 am    TO HOLD CALCIUM PLUS D FOR 1 WEEK PRIOR TO SURGERY    PRE-ADMISSION TESTING INSTRUCTIONS FOR ADULTS    Take these medications the morning of surgery with a small sip of water: NONE      No aspirin, advil, aleve, ibuprofen, naproxen, diet pills, decongestants, or herbal/vitamins for a week prior to surgery.    General Instructions:    • Do not eat solid food after midnight the night before surgery.  No gum, mints, or hard candy after midnight the night before surgery.  • You may drink clear liquids the day of surgery up until 2 hours before your arrival time.  • Clear liquids are liquids you can see through. Nothing RED in color.    Plain water    Sports drinks  Sodas     Gelatin (Jell-O)  Fruit juices without pulp such as white grape juice and apple juice  Popsicles that contain no fruit or yogurt  Tea or coffee (no cream or milk added)    • It is beneficial for you to have a clear drink that contains carbohydrates just before you leave your house and before your fasting time begins.  We suggest a 20 ounce bottle of Gatorade or Powerade for non-diabetic patients or a 20 ounce bottle of G2 or Powerade Zero for diabetic patients.     • Patients who avoid smoking, chewing tobacco and alcohol for 4 weeks prior to surgery have a reduced risk of post-operative complications.  If at all possible, quit smoking as many days before surgery as you can.    • Do not smoke, use chewing tobacco or drink alcohol the day of surgery    • Bring your C-PAP/ BI-PAP machine if you use one.  • Wear clean comfortable clothes and socks.  • Do not wear contact lenses, lotion, deodorant, or make-up.  Bring a case for your glasses if applicable. You may brush your teeth the morning of surgery.  • You may wear dentures/partials, do not put adhesive/glue on them.  • Bring crutches or walker if applicable.  • Leave all other jewelry and valuables at home.      Preventing a Surgical Site Infection:    • Shower  the night before and on the morning of surgery using the chlorhexidine soap you were given.  Use a clean washcloth with the soap.  Place clean sheets on your bed after showering the night before surgery. Do not use the CHG soap on your hair, face, or private areas. Wash your body gently for five (5) minutes. Do not scrub your skin.  Dry with a clean towel and dress in clean clothing.    • Do not shave the surgical area for 10 days-2 weeks prior to surgery  because the razor can irritate skin and make it easier to develop an infection.  • Make sure you, your family, and all healthcare providers clean their hands with soap and water or an alcohol based hand  before caring for you or your wound.      Day of surgery:    Your surgeon’s office will advise you of your arrival time for the day of surgery.    Upon arrival, a Pre-op nurse and Anesthesia provider will review your health history, obtain vital signs, and answer questions you may have.  The only belongings needed at this time will be your home medications and if applicable your C-PAP/BI-PAP machine.  If you are staying overnight your family can leave the rest of your belongings in the car and bring them to your room later.  A Pre-op nurse will start an IV and you may receive medication in preparation for surgery, including something to help you relax.  Your family will be able to see you in the Pre-op area.  While you are in surgery your family should notify the waiting room  if they leave the waiting room area and provide a contact phone number.    IF you have any questions, you can call the Pre-Admission Department at [phone] or your surgeon's office.  Notify your surgeon if  you become sick, have a fever, productive cough, or cannot be here the day of surgery    Please be aware that surgery does come with discomfort.  We want to make every effort to control your discomfort so please discuss any uncontrolled symptoms with your nurse.    Your doctor will most likely have prescribed pain medications.      If you are going home after surgery, you will receive individualized written care instructions before being discharged.  A responsible adult (over the age of 18) must drive you to and from the hospital on the day of your surgery and stay with you for 24 hours after anesthesia.    If you are staying overnight following surgery, you will be transported to your hospital room following the recovery period.  [facility] has all private rooms.    You may receive a survey regarding the care you received. Your feedback is very important and will be used to collect the necessary data to help us to continue to provide excellent care.     Deductibles and co-payments are collected on the day of service. Please be prepared to pay the required co-pay, deductible or deposit on the day of service as defined by your plan.

## 2020-01-14 ENCOUNTER — TELEPHONE (OUTPATIENT)
Dept: SURGERY | Facility: CLINIC | Age: 30
End: 2020-01-14

## 2020-01-14 DIAGNOSIS — Z95.828 PORT-A-CATH IN PLACE: Primary | ICD-10-CM

## 2020-01-14 NOTE — TELEPHONE ENCOUNTER
Patient has a sick child and needs to reschedule her surgery from this Friday the 17th to February 7 th to arrive at 6:30. Did you need to reorder any labs?

## 2020-02-03 ENCOUNTER — LAB (OUTPATIENT)
Dept: LAB | Facility: HOSPITAL | Age: 30
End: 2020-02-03

## 2020-02-03 DIAGNOSIS — Z95.828 PORT-A-CATH IN PLACE: ICD-10-CM

## 2020-02-03 LAB — HCG SERPL QL: NEGATIVE

## 2020-02-03 PROCEDURE — 36415 COLL VENOUS BLD VENIPUNCTURE: CPT

## 2020-02-03 PROCEDURE — 84703 CHORIONIC GONADOTROPIN ASSAY: CPT

## 2020-02-06 ENCOUNTER — ANESTHESIA EVENT (OUTPATIENT)
Dept: PERIOP | Facility: HOSPITAL | Age: 30
End: 2020-02-06

## 2020-02-07 ENCOUNTER — HOSPITAL ENCOUNTER (OUTPATIENT)
Facility: HOSPITAL | Age: 30
Setting detail: HOSPITAL OUTPATIENT SURGERY
Discharge: HOME | End: 2020-02-07
Attending: SURGERY | Admitting: SURGERY

## 2020-02-07 ENCOUNTER — ANESTHESIA (OUTPATIENT)
Dept: PERIOP | Facility: HOSPITAL | Age: 30
End: 2020-02-07

## 2020-02-07 VITALS
TEMPERATURE: 98.3 F | HEIGHT: 62 IN | BODY MASS INDEX: 26.68 KG/M2 | SYSTOLIC BLOOD PRESSURE: 109 MMHG | OXYGEN SATURATION: 97 % | HEART RATE: 86 BPM | WEIGHT: 145 LBS | DIASTOLIC BLOOD PRESSURE: 73 MMHG | RESPIRATION RATE: 16 BRPM

## 2020-02-07 DIAGNOSIS — Z95.828 PORT-A-CATH IN PLACE: Primary | ICD-10-CM

## 2020-02-07 PROCEDURE — S0260 H&P FOR SURGERY: HCPCS | Performed by: SURGERY

## 2020-02-07 PROCEDURE — 25010000002 PROPOFOL 10 MG/ML EMULSION: Performed by: NURSE ANESTHETIST, CERTIFIED REGISTERED

## 2020-02-07 PROCEDURE — 25010000002 MIDAZOLAM PER 1MG: Performed by: NURSE ANESTHETIST, CERTIFIED REGISTERED

## 2020-02-07 PROCEDURE — 25010000002 HYDROCORTISONE SODIUM SUCCINATE 100 MG RECONSTITUTED SOLUTION: Performed by: SURGERY

## 2020-02-07 PROCEDURE — 25010000002 ONDANSETRON PER 1 MG: Performed by: NURSE ANESTHETIST, CERTIFIED REGISTERED

## 2020-02-07 PROCEDURE — 25010000003 CEFAZOLIN SODIUM-DEXTROSE 2-3 GM-%(50ML) RECONSTITUTED SOLUTION: Performed by: SURGERY

## 2020-02-07 PROCEDURE — 36590 REMOVAL TUNNELED CV CATH: CPT | Performed by: SURGERY

## 2020-02-07 RX ORDER — FAMOTIDINE 10 MG/ML
20 INJECTION, SOLUTION INTRAVENOUS
Status: COMPLETED | OUTPATIENT
Start: 2020-02-07 | End: 2020-02-07

## 2020-02-07 RX ORDER — ONDANSETRON 2 MG/ML
4 INJECTION INTRAMUSCULAR; INTRAVENOUS ONCE AS NEEDED
Status: DISCONTINUED | OUTPATIENT
Start: 2020-02-07 | End: 2020-02-07 | Stop reason: HOSPADM

## 2020-02-07 RX ORDER — SODIUM CHLORIDE 9 MG/ML
40 INJECTION, SOLUTION INTRAVENOUS AS NEEDED
Status: DISCONTINUED | OUTPATIENT
Start: 2020-02-07 | End: 2020-02-07 | Stop reason: HOSPADM

## 2020-02-07 RX ORDER — PROPOFOL 10 MG/ML
VIAL (ML) INTRAVENOUS AS NEEDED
Status: DISCONTINUED | OUTPATIENT
Start: 2020-02-07 | End: 2020-02-07 | Stop reason: SURG

## 2020-02-07 RX ORDER — DEXMEDETOMIDINE HYDROCHLORIDE 100 UG/ML
INJECTION, SOLUTION INTRAVENOUS AS NEEDED
Status: DISCONTINUED | OUTPATIENT
Start: 2020-02-07 | End: 2020-02-07 | Stop reason: SURG

## 2020-02-07 RX ORDER — SODIUM CHLORIDE, SODIUM LACTATE, POTASSIUM CHLORIDE, CALCIUM CHLORIDE 600; 310; 30; 20 MG/100ML; MG/100ML; MG/100ML; MG/100ML
9 INJECTION, SOLUTION INTRAVENOUS CONTINUOUS
Status: DISCONTINUED | OUTPATIENT
Start: 2020-02-07 | End: 2020-02-07 | Stop reason: HOSPADM

## 2020-02-07 RX ORDER — MIDAZOLAM HYDROCHLORIDE 2 MG/2ML
1 INJECTION, SOLUTION INTRAMUSCULAR; INTRAVENOUS
Status: DISCONTINUED | OUTPATIENT
Start: 2020-02-07 | End: 2020-02-07 | Stop reason: HOSPADM

## 2020-02-07 RX ORDER — LIDOCAINE HYDROCHLORIDE 10 MG/ML
0.5 INJECTION, SOLUTION EPIDURAL; INFILTRATION; INTRACAUDAL; PERINEURAL ONCE AS NEEDED
Status: DISCONTINUED | OUTPATIENT
Start: 2020-02-07 | End: 2020-02-07 | Stop reason: HOSPADM

## 2020-02-07 RX ORDER — LIDOCAINE HYDROCHLORIDE 20 MG/ML
INJECTION, SOLUTION INFILTRATION; PERINEURAL AS NEEDED
Status: DISCONTINUED | OUTPATIENT
Start: 2020-02-07 | End: 2020-02-07 | Stop reason: SURG

## 2020-02-07 RX ORDER — MAGNESIUM HYDROXIDE 1200 MG/15ML
LIQUID ORAL AS NEEDED
Status: DISCONTINUED | OUTPATIENT
Start: 2020-02-07 | End: 2020-02-07 | Stop reason: HOSPADM

## 2020-02-07 RX ORDER — SODIUM CHLORIDE, SODIUM LACTATE, POTASSIUM CHLORIDE, CALCIUM CHLORIDE 600; 310; 30; 20 MG/100ML; MG/100ML; MG/100ML; MG/100ML
100 INJECTION, SOLUTION INTRAVENOUS CONTINUOUS
Status: DISCONTINUED | OUTPATIENT
Start: 2020-02-07 | End: 2020-02-07 | Stop reason: HOSPADM

## 2020-02-07 RX ORDER — ONDANSETRON 2 MG/ML
4 INJECTION INTRAMUSCULAR; INTRAVENOUS ONCE AS NEEDED
Status: COMPLETED | OUTPATIENT
Start: 2020-02-07 | End: 2020-02-07

## 2020-02-07 RX ORDER — LIDOCAINE HYDROCHLORIDE 10 MG/ML
INJECTION, SOLUTION EPIDURAL; INFILTRATION; INTRACAUDAL; PERINEURAL AS NEEDED
Status: DISCONTINUED | OUTPATIENT
Start: 2020-02-07 | End: 2020-02-07 | Stop reason: HOSPADM

## 2020-02-07 RX ORDER — CEFAZOLIN SODIUM 2 G/50ML
2 SOLUTION INTRAVENOUS ONCE
Status: COMPLETED | OUTPATIENT
Start: 2020-02-07 | End: 2020-02-07

## 2020-02-07 RX ORDER — OXYCODONE HYDROCHLORIDE AND ACETAMINOPHEN 5; 325 MG/1; MG/1
1 TABLET ORAL ONCE AS NEEDED
Status: DISCONTINUED | OUTPATIENT
Start: 2020-02-07 | End: 2020-02-07 | Stop reason: HOSPADM

## 2020-02-07 RX ORDER — FAMOTIDINE 20 MG/1
20 TABLET, FILM COATED ORAL
Status: COMPLETED | OUTPATIENT
Start: 2020-02-07 | End: 2020-02-07

## 2020-02-07 RX ORDER — OXYCODONE HYDROCHLORIDE AND ACETAMINOPHEN 5; 325 MG/1; MG/1
1 TABLET ORAL EVERY 4 HOURS PRN
Qty: 30 TABLET | Refills: 0 | Status: SHIPPED | OUTPATIENT
Start: 2020-02-07 | End: 2020-02-11

## 2020-02-07 RX ORDER — MIDAZOLAM HYDROCHLORIDE 2 MG/2ML
2 INJECTION, SOLUTION INTRAMUSCULAR; INTRAVENOUS
Status: DISCONTINUED | OUTPATIENT
Start: 2020-02-07 | End: 2020-02-07 | Stop reason: HOSPADM

## 2020-02-07 RX ORDER — SODIUM CHLORIDE 0.9 % (FLUSH) 0.9 %
10 SYRINGE (ML) INJECTION AS NEEDED
Status: DISCONTINUED | OUTPATIENT
Start: 2020-02-07 | End: 2020-02-07 | Stop reason: HOSPADM

## 2020-02-07 RX ORDER — SODIUM CHLORIDE 0.9 % (FLUSH) 0.9 %
10 SYRINGE (ML) INJECTION EVERY 12 HOURS SCHEDULED
Status: DISCONTINUED | OUTPATIENT
Start: 2020-02-07 | End: 2020-02-07 | Stop reason: HOSPADM

## 2020-02-07 RX ADMIN — FAMOTIDINE 20 MG: 10 INJECTION INTRAVENOUS at 07:46

## 2020-02-07 RX ADMIN — ONDANSETRON 4 MG: 2 INJECTION, SOLUTION INTRAMUSCULAR; INTRAVENOUS at 07:46

## 2020-02-07 RX ADMIN — PROPOFOL 20 MG: 10 INJECTION, EMULSION INTRAVENOUS at 08:12

## 2020-02-07 RX ADMIN — PROPOFOL 20 MG: 10 INJECTION, EMULSION INTRAVENOUS at 08:17

## 2020-02-07 RX ADMIN — LIDOCAINE HYDROCHLORIDE 100 MG: 20 INJECTION, SOLUTION INFILTRATION; PERINEURAL at 08:07

## 2020-02-07 RX ADMIN — HYDROCORTISONE SODIUM SUCCINATE 50 MG: 100 INJECTION, POWDER, FOR SOLUTION INTRAMUSCULAR; INTRAVENOUS at 07:28

## 2020-02-07 RX ADMIN — PROPOFOL 20 MG: 10 INJECTION, EMULSION INTRAVENOUS at 08:22

## 2020-02-07 RX ADMIN — CEFAZOLIN SODIUM 2 G: 2 SOLUTION INTRAVENOUS at 07:57

## 2020-02-07 RX ADMIN — DEXMEDETOMIDINE HYDROCHLORIDE 20 MCG: 100 INJECTION, SOLUTION, CONCENTRATE INTRAVENOUS at 07:59

## 2020-02-07 RX ADMIN — PROPOFOL 50 MG: 10 INJECTION, EMULSION INTRAVENOUS at 08:08

## 2020-02-07 RX ADMIN — SODIUM CHLORIDE, POTASSIUM CHLORIDE, SODIUM LACTATE AND CALCIUM CHLORIDE 9 ML/HR: 600; 310; 30; 20 INJECTION, SOLUTION INTRAVENOUS at 07:30

## 2020-02-07 RX ADMIN — PROPOFOL 30 MG: 10 INJECTION, EMULSION INTRAVENOUS at 08:13

## 2020-02-07 RX ADMIN — MIDAZOLAM HYDROCHLORIDE 2 MG: 1 INJECTION, SOLUTION INTRAMUSCULAR; INTRAVENOUS at 07:49

## 2020-02-07 NOTE — OP NOTE
Preoperative diagnosis PowerPort in place  Postoperative diagnosis the same  Procedure PowerPort removal  Complications none  Drains none  Specimen PowerPort tubing  Estimated blood loss 5 cc  Anesthesia IV sedation and 1% lidocaine without epinephrine  Surgeon Dr. Thomas  Assistant none  Indications the patient had a PowerPort in place it was functioning without problems but her need for the intravenous infusions has changed  Procedure after satisfactory IV sedation the patient's right chest was prepped and draped in sterile fashion.  1% lidocaine without epinephrine was used to locally infiltrate the skin and subcutaneous tissue at the site of the prior scar and around the port in the subcutaneous tissue.  Skin incision was made through the prior scar skin and subcutaneous tissue was divided.  Port tubing was found in the subcutaneous tissue and it was found at its insertion site into the cephalic vein.  Cephalic vein was ligated with a 2-0 silk simple suture.  The tubing was removed from the vein intact.  Port hub was removed from the subcutaneous pocket.  Specimen was passed off.  Hemostasis was assured with electrocautery.  Subcutaneous tissue was closed in interrupted simple fashion with use of 3-0 Vicryl.  Skin was closed with 4-0 Monocryl running subcuticular stitch burying the knot.  Wound was cleaned and dried and sterilely dressed.  The patient tolerated the procedure well was transferred to second stage in stable condition.  When she is awake alert tolerating p.o. she will be discharged home to follow-up in my office next week call for problems.  She may shower in 24 hours.  Diet as tolerated continue prehospitalization medications she was given a prescription for Percocet 5/325 1 p.o. every 4 hours as needed pain 30 these were dispensed without refills.  She should call for problems and call for an appointment

## 2020-02-07 NOTE — H&P
Bo Thomas MD   Physician   General Surgery          H&P   Signed        Encounter Date:  2019                                Signed                                     Expand widget buttonCollapse widget button        Show:Clear all      ManualTemplateCopied    Added by:          Bo Thomas MD Hover for detailscustomization button                              PATIENT INFORMATION    Rachel Srinivasan               - 1990         CHIEF COMPLAINT          Chief Complaint       Patient presents with       •     Follow-up       she is here to discuss removing the port         HISTORY OF PRESENT ILLNESS    HPI    She is without complaints.  She says the port has worked without problems but she is no longer getting the monthly infusions.  Her infusions now are every 6 months and the port has not been used for 6 months.  She can actually get that through peripheral vein so she no longer has any need for the long-term venous access.              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]       •     Myositis [M60.9]       •     Depressive disorder [F32.9]       •     Bronchitis [J40]                      PAST MEDICAL HISTORY           Past Medical History:       Diagnosis     Date       •     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CMS/HCC)             •     Chronic sore throat                   advised by PCP to have sleep study       •     GERD (gastroesophageal reflux disease)                   d/t steroids       •     Migraine             •     Myositis                   monthly IVIG       •     Shingles                            SURGICAL HISTORY            Past Surgical History:       Procedure     Laterality     Date       •     ADENOIDECTOMY                    •     EAR TUBES                   •     EYE SURGERY     Right                   eyelid reconstruction       •     SKIN BIOPSY                         right thigh       •     VENOUS ACCESS DEVICE (PORT) INSERTION     N/A     2018             Procedure: INSERTION VENOUS ACCESS DEVICE;  Surgeon: Bo Thomas MD;  Location: [facility];  Service: General                      FAMILY HISTORY            Family History       Problem     Relation     Age of Onset       •     No Known Problems     Mother             •     Lung cancer     Father                            SOCIAL HISTORY      Social History                    Occupational History       •     Not on file       Tobacco Use       •     Smoking status:     Former Smoker                   Packs/day:     0.50                   Years:     15.00                   Pack years:     7.50                   Types:     Cigarettes                   Last attempt to quit:     3/13/2014                   Years since quittin.7       •     Smokeless tobacco:     Never Used       Substance and Sexual Activity       •     Alcohol use:     No       •     Drug use:     No       •     Sexual activity:     Defer                 Debilities/Disabilities Identified: None         Emotional Behavior: Appropriate         CURRENT MEDICATIONS         Current Outpatient Medications:     •  azaTHIOprine (IMURAN) 50 MG tablet, Take 150 mg by mouth Daily., Disp: , Rfl:     •  Calcium Carb-Cholecalciferol (CALCIUM 600 + D) 600-200 MG-UNIT tablet, Take 1 tablet by mouth 2 (Two) Times a Day., Disp: , Rfl:     •  fluticasone (FLONASE) 50 MCG/ACT nasal spray, 2 sprays into the nostril(s) as directed by provider Daily., Disp: , Rfl:     •  methylPREDNISolone (MEDROL) 4 MG tablet, Take 8 mg by mouth Daily., Disp: , Rfl:     •  mycophenolate (CELLCEPT) 500 MG tablet, Take 500 mg by mouth Every 12 (Twelve) Hours., Disp: , Rfl:     •  nystatin (MYCOSTATIN) 120792 UNIT/ML  "suspension, nystatin 100,000 unit/mL oral suspension, Disp: , Rfl:     •  riTUXimab (RITUXAN IV), Infuse  into a venous catheter., Disp: , Rfl:          ALLERGIES    Patient has no known allergies.         VITALS          Vitals:             12/17/19 0847       BP:     132/72       Resp:     18       Weight:     66.7 kg (147 lb)       Height:     157.5 cm (62\")                 LAST RESULTS                       Admission on 01/02/2019, Discharged on 01/02/2019       Component     Date     Value     Ref Range     Status       •     Glucose     01/02/2019     106*     65 - 99 mg/dL     Final       •     BUN     01/02/2019     11      6 - 20 mg/dL     Final       •     Creatinine     01/02/2019     0.81      0.57 - 1.00 mg/dL     Final       •     Sodium     01/02/2019     141      136 - 145 mmol/L     Final       •     Potassium     01/02/2019     3.5      3.5 - 5.2 mmol/L     Final       •     Chloride     01/02/2019     101      98 - 107 mmol/L     Final       •     CO2     01/02/2019     28.0      22.0 - 29.0 mmol/L     Final       •     Calcium     01/02/2019     9.7      8.6 - 10.5 mg/dL     Final       •     Total Protein     01/02/2019     7.5      6.0 - 8.5 g/dL     Final       •     Albumin     01/02/2019     3.90      3.50 - 5.20 g/dL     Final       •     ALT (SGPT)     01/02/2019     77*     5 - 33 U/L     Final       •     AST (SGOT)     01/02/2019     86*     5 - 32 U/L     Final       •     Alkaline Phosphatase     01/02/2019     122      40 - 129 U/L     Final       •     Total Bilirubin     01/02/2019     0.4      0.2 - 1.2 mg/dL     Final       •     eGFR Non African Amer     01/02/2019     83      >60 mL/min/1.73     Final       •     Globulin     01/02/2019     3.6      gm/dL     Final       •     A/G Ratio     01/02/2019     1.1      g/dL     Final       •     BUN/Creatinine Ratio     01/02/2019     13.6      7.0 - 25.0     Final       •     Anion Gap     01/02/2019     12.0      mmol/L     Final "       •     Lipase     01/02/2019     41      13 - 60 U/L     Final       •     HCG Qualitative     01/02/2019     Negative      Negative     Final       •     Color, UA     01/02/2019     Yellow      Yellow, Straw     Final       •     Appearance, UA     01/02/2019     Clear      Clear     Final       •     pH, UA     01/02/2019     6.5      4.5 - 8.0     Final       •     Specific Gravity, UA     01/02/2019     1.034*     1.003 - 1.030     Final             Result obtained by Refractometer       •     Glucose, UA     01/02/2019     Negative      Negative     Final       •     Ketones, UA     01/02/2019     Trace*     Negative, 80 mg/dL (3+), >=160 mg/dL (4+)     Final       •     Bilirubin, UA     01/02/2019     Small (1+)*     Negative     Final       •     Blood, UA     01/02/2019     Large (3+)*     Negative     Final       •     Protein, UA     01/02/2019     Trace*     Negative     Final       •     Leuk Esterase, UA     01/02/2019     Negative      Negative     Final       •     Nitrite, UA     01/02/2019     Negative      Negative     Final       •     Urobilinogen, UA     01/02/2019     1.0 E.U./dL      0.2 - 1.0 E.U./dL     Final       •     WBC     01/02/2019     17.97*     4.80 - 10.80 10*3/mm3     Final       •     RBC     01/02/2019     4.43      4.20 - 5.40 10*6/mm3     Final       •     Hemoglobin     01/02/2019     11.9*     12.0 - 16.0 g/dL     Final       •     Hematocrit     01/02/2019     38.3      37.0 - 47.0 %     Final       •     MCV     01/02/2019     86.5      81.0 - 99.0 fL     Final       •     MCH     01/02/2019     26.9*     27.0 - 31.0 pg     Final       •     MCHC     01/02/2019     31.1      31.0 - 37.0 g/dL     Final       •     RDW     01/02/2019     15.9*     11.5 - 14.5 %     Final       •     RDW-SD     01/02/2019     50.0      37.0 - 54.0 fl     Final       •     MPV     01/02/2019     11.8*     7.4 - 10.4 fL     Final       •     Platelets     01/02/2019     204      140 -  500 10*3/mm3     Final       •     Neutrophil %     01/02/2019     86.8*     45.0 - 70.0 %     Final       •     Lymphocyte %     01/02/2019     5.1*     20.0 - 45.0 %     Final       •     Monocyte %     01/02/2019     6.2      3.0 - 8.0 %     Final       •     Eosinophil %     01/02/2019     0.1      0.0 - 4.0 %     Final       •     Basophil %     01/02/2019     0.2      0.0 - 2.0 %     Final       •     Immature Grans %     01/02/2019     1.6*     0.0 - 0.5 %     Final       •     Neutrophils, Absolute     01/02/2019     15.60*     1.50 - 8.30 10*3/mm3     Final       •     Lymphocytes, Absolute     01/02/2019     0.92      0.60 - 4.80 10*3/mm3     Final       •     Monocytes, Absolute     01/02/2019     1.12*     0.00 - 1.00 10*3/mm3     Final       •     Eosinophils, Absolute     01/02/2019     0.01*     0.10 - 0.30 10*3/mm3     Final       •     Basophils, Absolute     01/02/2019     0.03      0.00 - 0.20 10*3/mm3     Final       •     Immature Grans, Absolute     01/02/2019     0.29*     0.00 - 0.03 10*3/mm3     Final       •     nRBC     01/02/2019     0.0      0.0 - 0.0 /100 WBC     Final            No results found.         PHYSICAL EXAM    Physical Exam alert white female in no active distress.  She is oriented x3 her gait is normal her heart shows a regular rate and rhythm her lungs are clear and equal her port is easily palpable on her right chest.  There is no evidence of infection.         ASSESSMENT port in place              PLAN    The risk benefits and options were discussed with her in detail.  We will proceed with remove the port at [facility] on January 17.  She is on daily steroids so we will give her a preoperative dose of hydrocortisone.                                                                                                Office Visit on 12/17/2019                                Routing History                                Detailed Report

## 2020-02-07 NOTE — ANESTHESIA POSTPROCEDURE EVALUATION
Patient: Rachel Srinivasan    Procedure Summary     Date:  02/07/20 Room / Location:  [facility] OR 4 /  [facility] OR    Anesthesia Start:  0751 Anesthesia Stop:  0833    Procedure:  REMOVAL VENOUS ACCESS DEVICE (Right ) Diagnosis:       Port-A-Cath in place      (Port-A-Cath in place [Z95.828])    Surgeon:  Bo Thomas MD Provider:  Patrick Kee CRNA    Anesthesia Type:  MAC ASA Status:  3          Anesthesia Type: MAC    Vitals  Vitals Value Taken Time   /78 2/7/2020  8:31 AM   Temp 98.3 °F (36.8 °C) 2/7/2020  8:31 AM   Pulse 102 2/7/2020  8:31 AM   Resp 15 2/7/2020  8:31 AM   SpO2 97 % 2/7/2020  8:31 AM           Post Anesthesia Care and Evaluation    Patient location during evaluation: bedside  Patient participation: complete - patient participated  Level of consciousness: awake and alert  Pain score: 0  Pain management: adequate  Airway patency: patent  Anesthetic complications: No anesthetic complications    Cardiovascular status: acceptable  Respiratory status: acceptable  Hydration status: acceptable

## 2020-02-07 NOTE — ANESTHESIA PREPROCEDURE EVALUATION
Anesthesia Evaluation     Patient summary reviewed and Nursing notes reviewed   no history of anesthetic complications:  NPO Solid Status: > 8 hours  NPO Liquid Status: > 8 hours           Airway   Mallampati: III  TM distance: >3 FB  Neck ROM: full  Possible difficult intubation and Small opening  Dental - normal exam     Pulmonary - normal exam    breath sounds clear to auscultation  (+) a smoker (quit 2014) Former, sleep apnea (not tested, chronic sore throat & snore),   Cardiovascular - negative cardio ROS and normal exam  Exercise tolerance: good (4-7 METS)    Rhythm: regular  Rate: normal        Neuro/Psych  (+) headaches,       ROS Comment: Myositis  GI/Hepatic/Renal/Endo    (+)  GERD (no meds currently) well controlled,  renal disease stones,     Musculoskeletal     (+) neck pain,   Abdominal  - normal exam   Substance History - negative use     OB/GYN          Other - negative ROS                       Anesthesia Plan    ASA 3     MAC       Anesthetic plan, all risks, benefits, and alternatives have been provided, discussed and informed consent has been obtained with: patient.  Use of blood products discussed with patient  Consented to blood products.

## 2020-02-07 NOTE — INTERVAL H&P NOTE
"  H&P updated. The patient was examined and the following changes are noted:  vs  /95   Pulse 110   Resp 16   Ht 157.5 cm (62\")   Wt 65.8 kg (145 lb)   LMP 01/24/2020   SpO2 99%   BMI 26.52 kg/m²         "

## 2020-02-11 ENCOUNTER — OFFICE VISIT (OUTPATIENT)
Dept: SURGERY | Facility: CLINIC | Age: 30
End: 2020-02-11

## 2020-02-11 DIAGNOSIS — Z09 SURGICAL FOLLOW-UP CARE: Primary | ICD-10-CM

## 2020-02-11 PROCEDURE — 99024 POSTOP FOLLOW-UP VISIT: CPT | Performed by: SURGERY

## 2020-02-11 NOTE — PROGRESS NOTES
4 day PO PowerPort removal  - no problems at this time  She is without complaints.  The incision is healing well.  There is mild ecchymosis that should really resolve over the next 2 weeks.  Wound care was discussed I will see her back PRN

## 2020-04-08 ENCOUNTER — TRANSCRIBE ORDERS (OUTPATIENT)
Dept: ADMINISTRATIVE | Facility: HOSPITAL | Age: 30
End: 2020-04-08

## 2020-04-08 ENCOUNTER — LAB (OUTPATIENT)
Dept: LAB | Facility: HOSPITAL | Age: 30
End: 2020-04-08

## 2020-04-08 DIAGNOSIS — M60.9 MYOSITIS, UNSPECIFIED MYOSITIS TYPE, UNSPECIFIED SITE: Primary | ICD-10-CM

## 2020-04-08 DIAGNOSIS — M60.9 MYOSITIS, UNSPECIFIED MYOSITIS TYPE, UNSPECIFIED SITE: ICD-10-CM

## 2020-04-08 LAB
ALBUMIN SERPL-MCNC: 4.4 G/DL (ref 3.5–5.2)
ALBUMIN/GLOB SERPL: 1.7 G/DL
ALP SERPL-CCNC: 97 U/L (ref 39–117)
ALT SERPL W P-5'-P-CCNC: 16 U/L (ref 1–33)
ANION GAP SERPL CALCULATED.3IONS-SCNC: 12.6 MMOL/L (ref 5–15)
AST SERPL-CCNC: 19 U/L (ref 1–32)
BILIRUB CONJ SERPL-MCNC: <0.2 MG/DL (ref 0.2–0.3)
BILIRUB SERPL-MCNC: 0.3 MG/DL (ref 0.2–1.2)
BUN BLD-MCNC: 12 MG/DL (ref 6–20)
BUN/CREAT SERPL: 17.1 (ref 7–25)
CALCIUM SPEC-SCNC: 9.5 MG/DL (ref 8.6–10.5)
CHLORIDE SERPL-SCNC: 104 MMOL/L (ref 98–107)
CK SERPL-CCNC: 109 U/L (ref 20–180)
CO2 SERPL-SCNC: 22.4 MMOL/L (ref 22–29)
CREAT BLD-MCNC: 0.7 MG/DL (ref 0.57–1)
CRP SERPL-MCNC: 0.65 MG/DL (ref 0–0.5)
DEPRECATED RDW RBC AUTO: 53.1 FL (ref 37–54)
ERYTHROCYTE [DISTWIDTH] IN BLOOD BY AUTOMATED COUNT: 18.3 % (ref 12.3–15.4)
ERYTHROCYTE [SEDIMENTATION RATE] IN BLOOD: 36 MM/HR (ref 0–20)
GFR SERPL CREATININE-BSD FRML MDRD: 98 ML/MIN/1.73
GLOBULIN UR ELPH-MCNC: 2.6 GM/DL
GLUCOSE BLD-MCNC: 88 MG/DL (ref 65–99)
HCT VFR BLD AUTO: 31.4 % (ref 34–46.6)
HGB BLD-MCNC: 9.6 G/DL (ref 12–15.9)
MCH RBC QN AUTO: 24.3 PG (ref 26.6–33)
MCHC RBC AUTO-ENTMCNC: 30.6 G/DL (ref 31.5–35.7)
MCV RBC AUTO: 79.5 FL (ref 79–97)
PLATELET # BLD AUTO: 275 10*3/MM3 (ref 140–450)
PMV BLD AUTO: 10.5 FL (ref 6–12)
POTASSIUM BLD-SCNC: 4.2 MMOL/L (ref 3.5–5.2)
PROT SERPL-MCNC: 7 G/DL (ref 6–8.5)
RBC # BLD AUTO: 3.95 10*6/MM3 (ref 3.77–5.28)
SODIUM BLD-SCNC: 139 MMOL/L (ref 136–145)
WBC NRBC COR # BLD: 7.88 10*3/MM3 (ref 3.4–10.8)

## 2020-04-08 PROCEDURE — 85652 RBC SED RATE AUTOMATED: CPT

## 2020-04-08 PROCEDURE — 86140 C-REACTIVE PROTEIN: CPT

## 2020-04-08 PROCEDURE — 82085 ASSAY OF ALDOLASE: CPT

## 2020-04-08 PROCEDURE — 82550 ASSAY OF CK (CPK): CPT

## 2020-04-08 PROCEDURE — 85027 COMPLETE CBC AUTOMATED: CPT

## 2020-04-08 PROCEDURE — 82248 BILIRUBIN DIRECT: CPT

## 2020-04-08 PROCEDURE — 36415 COLL VENOUS BLD VENIPUNCTURE: CPT

## 2020-04-08 PROCEDURE — 80053 COMPREHEN METABOLIC PANEL: CPT

## 2020-04-10 LAB — ALDOLASE SERPL-CCNC: 7.1 U/L (ref 3.3–10.3)

## 2020-05-06 ENCOUNTER — LAB (OUTPATIENT)
Dept: LAB | Facility: HOSPITAL | Age: 30
End: 2020-05-06

## 2020-05-06 ENCOUNTER — TRANSCRIBE ORDERS (OUTPATIENT)
Dept: ADMINISTRATIVE | Facility: HOSPITAL | Age: 30
End: 2020-05-06

## 2020-05-06 DIAGNOSIS — R30.9 MICTURITION PAINFUL: Primary | ICD-10-CM

## 2020-05-06 DIAGNOSIS — R30.9 MICTURITION PAINFUL: ICD-10-CM

## 2020-05-06 DIAGNOSIS — M60.9 MYOSITIS, UNSPECIFIED MYOSITIS TYPE, UNSPECIFIED SITE: Primary | ICD-10-CM

## 2020-05-06 DIAGNOSIS — M60.9 MYOSITIS, UNSPECIFIED MYOSITIS TYPE, UNSPECIFIED SITE: ICD-10-CM

## 2020-05-06 LAB
ALBUMIN SERPL-MCNC: 4.4 G/DL (ref 3.5–5.2)
ALP SERPL-CCNC: 144 U/L (ref 39–117)
ALT SERPL W P-5'-P-CCNC: 38 U/L (ref 1–33)
AST SERPL-CCNC: 37 U/L (ref 1–32)
BACTERIA UR QL AUTO: ABNORMAL /HPF
BILIRUB CONJ SERPL-MCNC: <0.2 MG/DL (ref 0.2–0.3)
BILIRUB INDIRECT SERPL-MCNC: ABNORMAL MG/DL
BILIRUB SERPL-MCNC: 0.3 MG/DL (ref 0.2–1.2)
BILIRUB UR QL STRIP: NEGATIVE
CLARITY UR: ABNORMAL
COLOR UR: YELLOW
GLUCOSE UR STRIP-MCNC: NEGATIVE MG/DL
HGB UR QL STRIP.AUTO: ABNORMAL
HYALINE CASTS UR QL AUTO: ABNORMAL /LPF
KETONES UR QL STRIP: NEGATIVE
LEUKOCYTE ESTERASE UR QL STRIP.AUTO: ABNORMAL
NITRITE UR QL STRIP: POSITIVE
PH UR STRIP.AUTO: <=5 [PH] (ref 5–8)
PROT SERPL-MCNC: 7.6 G/DL (ref 6–8.5)
PROT UR QL STRIP: ABNORMAL
RBC # UR: ABNORMAL /HPF
REF LAB TEST METHOD: ABNORMAL
SP GR UR STRIP: 1.02 (ref 1–1.03)
SQUAMOUS #/AREA URNS HPF: ABNORMAL /HPF
UROBILINOGEN UR QL STRIP: ABNORMAL
WBC UR QL AUTO: ABNORMAL /HPF

## 2020-05-06 PROCEDURE — 87077 CULTURE AEROBIC IDENTIFY: CPT

## 2020-05-06 PROCEDURE — 87186 SC STD MICRODIL/AGAR DIL: CPT

## 2020-05-06 PROCEDURE — 36415 COLL VENOUS BLD VENIPUNCTURE: CPT

## 2020-05-06 PROCEDURE — 81001 URINALYSIS AUTO W/SCOPE: CPT

## 2020-05-06 PROCEDURE — 80076 HEPATIC FUNCTION PANEL: CPT

## 2020-05-06 PROCEDURE — 87086 URINE CULTURE/COLONY COUNT: CPT

## 2020-05-07 ENCOUNTER — LAB (OUTPATIENT)
Dept: LAB | Facility: HOSPITAL | Age: 30
End: 2020-05-07

## 2020-05-07 ENCOUNTER — TRANSCRIBE ORDERS (OUTPATIENT)
Dept: ADMINISTRATIVE | Facility: HOSPITAL | Age: 30
End: 2020-05-07

## 2020-05-07 DIAGNOSIS — Z79.899 ENCOUNTER FOR LONG-TERM (CURRENT) USE OF OTHER MEDICATIONS: ICD-10-CM

## 2020-05-07 DIAGNOSIS — Z79.899 ENCOUNTER FOR LONG-TERM (CURRENT) USE OF OTHER MEDICATIONS: Primary | ICD-10-CM

## 2020-05-07 DIAGNOSIS — M33.20 POLYMYOSITIS (HCC): ICD-10-CM

## 2020-05-07 LAB
ALBUMIN SERPL-MCNC: 4.2 G/DL (ref 3.5–5.2)
ALBUMIN/GLOB SERPL: 1.5 G/DL
ALP SERPL-CCNC: 131 U/L (ref 39–117)
ALT SERPL W P-5'-P-CCNC: 30 U/L (ref 1–33)
ANION GAP SERPL CALCULATED.3IONS-SCNC: 15.1 MMOL/L (ref 5–15)
AST SERPL-CCNC: 25 U/L (ref 1–32)
BILIRUB SERPL-MCNC: 0.3 MG/DL (ref 0.2–1.2)
BUN BLD-MCNC: 8 MG/DL (ref 6–20)
BUN/CREAT SERPL: 13.6 (ref 7–25)
CALCIUM SPEC-SCNC: 9.1 MG/DL (ref 8.6–10.5)
CHLORIDE SERPL-SCNC: 103 MMOL/L (ref 98–107)
CK SERPL-CCNC: 241 U/L (ref 20–180)
CO2 SERPL-SCNC: 21.9 MMOL/L (ref 22–29)
CREAT BLD-MCNC: 0.59 MG/DL (ref 0.57–1)
DEPRECATED RDW RBC AUTO: 46.4 FL (ref 37–54)
ERYTHROCYTE [DISTWIDTH] IN BLOOD BY AUTOMATED COUNT: 16.9 % (ref 12.3–15.4)
FERRITIN SERPL-MCNC: 18.3 NG/ML (ref 13–150)
GFR SERPL CREATININE-BSD FRML MDRD: 119 ML/MIN/1.73
GLOBULIN UR ELPH-MCNC: 2.8 GM/DL
GLUCOSE BLD-MCNC: 83 MG/DL (ref 65–99)
HCT VFR BLD AUTO: 32.5 % (ref 34–46.6)
HGB BLD-MCNC: 9.8 G/DL (ref 12–15.9)
IRON 24H UR-MRATE: 23 MCG/DL (ref 37–145)
IRON SATN MFR SERPL: 5 % (ref 20–50)
MCH RBC QN AUTO: 23.1 PG (ref 26.6–33)
MCHC RBC AUTO-ENTMCNC: 30.2 G/DL (ref 31.5–35.7)
MCV RBC AUTO: 76.7 FL (ref 79–97)
PLATELET # BLD AUTO: 253 10*3/MM3 (ref 140–450)
PMV BLD AUTO: 10.8 FL (ref 6–12)
POTASSIUM BLD-SCNC: 3.8 MMOL/L (ref 3.5–5.2)
PROT SERPL-MCNC: 7 G/DL (ref 6–8.5)
RBC # BLD AUTO: 4.24 10*6/MM3 (ref 3.77–5.28)
SODIUM BLD-SCNC: 140 MMOL/L (ref 136–145)
TIBC SERPL-MCNC: 499 MCG/DL (ref 298–536)
TRANSFERRIN SERPL-MCNC: 335 MG/DL (ref 200–360)
VIT B12 BLD-MCNC: 487 PG/ML (ref 211–946)
WBC NRBC COR # BLD: 5.79 10*3/MM3 (ref 3.4–10.8)

## 2020-05-07 PROCEDURE — 82728 ASSAY OF FERRITIN: CPT

## 2020-05-07 PROCEDURE — 80053 COMPREHEN METABOLIC PANEL: CPT

## 2020-05-07 PROCEDURE — 82607 VITAMIN B-12: CPT

## 2020-05-07 PROCEDURE — 85027 COMPLETE CBC AUTOMATED: CPT

## 2020-05-07 PROCEDURE — 82550 ASSAY OF CK (CPK): CPT

## 2020-05-07 PROCEDURE — 84466 ASSAY OF TRANSFERRIN: CPT

## 2020-05-07 PROCEDURE — 83540 ASSAY OF IRON: CPT

## 2020-05-08 LAB — BACTERIA SPEC AEROBE CULT: ABNORMAL

## 2020-05-27 ENCOUNTER — LAB (OUTPATIENT)
Dept: LAB | Facility: HOSPITAL | Age: 30
End: 2020-05-27

## 2020-05-27 ENCOUNTER — TRANSCRIBE ORDERS (OUTPATIENT)
Dept: ADMINISTRATIVE | Facility: HOSPITAL | Age: 30
End: 2020-05-27

## 2020-05-27 DIAGNOSIS — M60.9 MYOSITIS, UNSPECIFIED MYOSITIS TYPE, UNSPECIFIED SITE: Primary | ICD-10-CM

## 2020-05-27 DIAGNOSIS — M60.9 MYOSITIS, UNSPECIFIED MYOSITIS TYPE, UNSPECIFIED SITE: ICD-10-CM

## 2020-05-27 PROCEDURE — 36415 COLL VENOUS BLD VENIPUNCTURE: CPT

## 2020-05-27 PROCEDURE — 82085 ASSAY OF ALDOLASE: CPT

## 2020-05-27 PROCEDURE — 82550 ASSAY OF CK (CPK): CPT

## 2020-05-28 LAB — CK SERPL-CCNC: 346 U/L (ref 20–180)

## 2020-05-29 LAB — ALDOLASE SERPL-CCNC: 8.7 U/L (ref 3.3–10.3)

## 2020-06-03 ENCOUNTER — LAB (OUTPATIENT)
Dept: LAB | Facility: HOSPITAL | Age: 30
End: 2020-06-03

## 2020-06-03 ENCOUNTER — TRANSCRIBE ORDERS (OUTPATIENT)
Dept: ADMINISTRATIVE | Facility: HOSPITAL | Age: 30
End: 2020-06-03

## 2020-06-03 DIAGNOSIS — R30.0 DYSURIA: Primary | ICD-10-CM

## 2020-06-03 DIAGNOSIS — R30.0 DYSURIA: ICD-10-CM

## 2020-06-03 LAB
BACTERIA UR QL AUTO: ABNORMAL /HPF
BILIRUB UR QL STRIP: NEGATIVE
CLARITY UR: ABNORMAL
COLOR UR: YELLOW
GLUCOSE UR STRIP-MCNC: NEGATIVE MG/DL
HGB UR QL STRIP.AUTO: ABNORMAL
HYALINE CASTS UR QL AUTO: ABNORMAL /LPF
KETONES UR QL STRIP: NEGATIVE
LEUKOCYTE ESTERASE UR QL STRIP.AUTO: ABNORMAL
NITRITE UR QL STRIP: POSITIVE
PH UR STRIP.AUTO: 6 [PH] (ref 5–8)
PROT UR QL STRIP: NEGATIVE
RBC # UR: ABNORMAL /HPF
REF LAB TEST METHOD: ABNORMAL
SP GR UR STRIP: 1.01 (ref 1–1.03)
SQUAMOUS #/AREA URNS HPF: ABNORMAL /HPF
UROBILINOGEN UR QL STRIP: ABNORMAL
WBC UR QL AUTO: ABNORMAL /HPF

## 2020-06-03 PROCEDURE — 87186 SC STD MICRODIL/AGAR DIL: CPT

## 2020-06-03 PROCEDURE — 87086 URINE CULTURE/COLONY COUNT: CPT

## 2020-06-03 PROCEDURE — 81001 URINALYSIS AUTO W/SCOPE: CPT

## 2020-06-03 PROCEDURE — 87077 CULTURE AEROBIC IDENTIFY: CPT

## 2020-06-05 LAB — BACTERIA SPEC AEROBE CULT: ABNORMAL

## 2020-06-08 ENCOUNTER — TRANSCRIBE ORDERS (OUTPATIENT)
Dept: ADMINISTRATIVE | Facility: HOSPITAL | Age: 30
End: 2020-06-08

## 2020-06-08 ENCOUNTER — LAB (OUTPATIENT)
Dept: LAB | Facility: HOSPITAL | Age: 30
End: 2020-06-08

## 2020-06-08 DIAGNOSIS — Z79.899 ENCOUNTER FOR LONG-TERM (CURRENT) USE OF OTHER MEDICATIONS: ICD-10-CM

## 2020-06-08 DIAGNOSIS — Z79.899 ENCOUNTER FOR LONG-TERM (CURRENT) USE OF OTHER MEDICATIONS: Primary | ICD-10-CM

## 2020-06-08 LAB
ALBUMIN SERPL-MCNC: 4.3 G/DL (ref 3.5–5.2)
ALBUMIN/GLOB SERPL: 2 G/DL
ALP SERPL-CCNC: 94 U/L (ref 39–117)
ALT SERPL W P-5'-P-CCNC: 19 U/L (ref 1–33)
ANION GAP SERPL CALCULATED.3IONS-SCNC: 13.9 MMOL/L (ref 5–15)
AST SERPL-CCNC: 23 U/L (ref 1–32)
BASOPHILS # BLD AUTO: 0.02 10*3/MM3 (ref 0–0.2)
BASOPHILS NFR BLD AUTO: 0.4 % (ref 0–1.5)
BILIRUB SERPL-MCNC: 0.4 MG/DL (ref 0.2–1.2)
BUN BLD-MCNC: 5 MG/DL (ref 6–20)
BUN/CREAT SERPL: 8.8 (ref 7–25)
CALCIUM SPEC-SCNC: 8.8 MG/DL (ref 8.6–10.5)
CHLORIDE SERPL-SCNC: 105 MMOL/L (ref 98–107)
CK SERPL-CCNC: 262 U/L (ref 20–180)
CO2 SERPL-SCNC: 20.1 MMOL/L (ref 22–29)
CREAT BLD-MCNC: 0.57 MG/DL (ref 0.57–1)
DEPRECATED RDW RBC AUTO: 47.3 FL (ref 37–54)
EOSINOPHIL # BLD AUTO: 0.02 10*3/MM3 (ref 0–0.4)
EOSINOPHIL NFR BLD AUTO: 0.4 % (ref 0.3–6.2)
ERYTHROCYTE [DISTWIDTH] IN BLOOD BY AUTOMATED COUNT: 17.1 % (ref 12.3–15.4)
GFR SERPL CREATININE-BSD FRML MDRD: 124 ML/MIN/1.73
GLOBULIN UR ELPH-MCNC: 2.2 GM/DL
GLUCOSE BLD-MCNC: 91 MG/DL (ref 65–99)
HCT VFR BLD AUTO: 29.9 % (ref 34–46.6)
HGB BLD-MCNC: 8.8 G/DL (ref 12–15.9)
IMM GRANULOCYTES # BLD AUTO: 0.02 10*3/MM3 (ref 0–0.05)
IMM GRANULOCYTES NFR BLD AUTO: 0.4 % (ref 0–0.5)
LYMPHOCYTES # BLD AUTO: 0.76 10*3/MM3 (ref 0.7–3.1)
LYMPHOCYTES NFR BLD AUTO: 13.4 % (ref 19.6–45.3)
MCH RBC QN AUTO: 22.4 PG (ref 26.6–33)
MCHC RBC AUTO-ENTMCNC: 29.4 G/DL (ref 31.5–35.7)
MCV RBC AUTO: 76.1 FL (ref 79–97)
MONOCYTES # BLD AUTO: 0.35 10*3/MM3 (ref 0.1–0.9)
MONOCYTES NFR BLD AUTO: 6.2 % (ref 5–12)
NEUTROPHILS # BLD AUTO: 4.49 10*3/MM3 (ref 1.7–7)
NEUTROPHILS NFR BLD AUTO: 79.2 % (ref 42.7–76)
NRBC BLD AUTO-RTO: 0 /100 WBC (ref 0–0.2)
PLATELET # BLD AUTO: 241 10*3/MM3 (ref 140–450)
PMV BLD AUTO: 11.5 FL (ref 6–12)
POTASSIUM BLD-SCNC: 3.8 MMOL/L (ref 3.5–5.2)
PROT SERPL-MCNC: 6.5 G/DL (ref 6–8.5)
RBC # BLD AUTO: 3.93 10*6/MM3 (ref 3.77–5.28)
SODIUM BLD-SCNC: 139 MMOL/L (ref 136–145)
WBC NRBC COR # BLD: 5.66 10*3/MM3 (ref 3.4–10.8)

## 2020-06-08 PROCEDURE — 85025 COMPLETE CBC W/AUTO DIFF WBC: CPT

## 2020-06-08 PROCEDURE — 82550 ASSAY OF CK (CPK): CPT

## 2020-06-08 PROCEDURE — 80053 COMPREHEN METABOLIC PANEL: CPT

## 2020-06-08 PROCEDURE — 36415 COLL VENOUS BLD VENIPUNCTURE: CPT

## 2020-06-15 ENCOUNTER — LAB (OUTPATIENT)
Dept: LAB | Facility: HOSPITAL | Age: 30
End: 2020-06-15

## 2020-06-15 ENCOUNTER — TRANSCRIBE ORDERS (OUTPATIENT)
Dept: ADMINISTRATIVE | Facility: HOSPITAL | Age: 30
End: 2020-06-15

## 2020-06-15 DIAGNOSIS — R30.9 MICTURITION PAINFUL: Primary | ICD-10-CM

## 2020-06-15 DIAGNOSIS — R30.9 MICTURITION PAINFUL: ICD-10-CM

## 2020-06-15 LAB
BACTERIA UR QL AUTO: ABNORMAL /HPF
BILIRUB UR QL STRIP: NEGATIVE
CLARITY UR: CLEAR
COLOR UR: YELLOW
GLUCOSE UR STRIP-MCNC: NEGATIVE MG/DL
HGB UR QL STRIP.AUTO: NEGATIVE
HYALINE CASTS UR QL AUTO: ABNORMAL /LPF
KETONES UR QL STRIP: NEGATIVE
LEUKOCYTE ESTERASE UR QL STRIP.AUTO: ABNORMAL
NITRITE UR QL STRIP: NEGATIVE
PH UR STRIP.AUTO: 6 [PH] (ref 5–8)
PROT UR QL STRIP: NEGATIVE
RBC # UR: ABNORMAL /HPF
REF LAB TEST METHOD: ABNORMAL
SP GR UR STRIP: 1.03 (ref 1–1.03)
SQUAMOUS #/AREA URNS HPF: ABNORMAL /HPF
UROBILINOGEN UR QL STRIP: ABNORMAL
WBC UR QL AUTO: ABNORMAL /HPF

## 2020-06-15 PROCEDURE — 81001 URINALYSIS AUTO W/SCOPE: CPT

## 2020-10-05 ENCOUNTER — TRANSCRIBE ORDERS (OUTPATIENT)
Dept: ADMINISTRATIVE | Facility: HOSPITAL | Age: 30
End: 2020-10-05

## 2020-10-05 ENCOUNTER — LAB (OUTPATIENT)
Dept: LAB | Facility: HOSPITAL | Age: 30
End: 2020-10-05

## 2020-10-05 DIAGNOSIS — Z79.899 ENCOUNTER FOR LONG-TERM (CURRENT) USE OF OTHER MEDICATIONS: ICD-10-CM

## 2020-10-05 DIAGNOSIS — Z79.899 ENCOUNTER FOR LONG-TERM (CURRENT) USE OF OTHER MEDICATIONS: Primary | ICD-10-CM

## 2020-10-05 LAB
BASOPHILS # BLD AUTO: 0.02 10*3/MM3 (ref 0–0.2)
BASOPHILS NFR BLD AUTO: 0.2 % (ref 0–1.5)
DEPRECATED RDW RBC AUTO: 50.3 FL (ref 37–54)
EOSINOPHIL # BLD AUTO: 0.03 10*3/MM3 (ref 0–0.4)
EOSINOPHIL NFR BLD AUTO: 0.4 % (ref 0.3–6.2)
ERYTHROCYTE [DISTWIDTH] IN BLOOD BY AUTOMATED COUNT: 15.5 % (ref 12.3–15.4)
HCT VFR BLD AUTO: 37 % (ref 34–46.6)
HGB BLD-MCNC: 11.9 G/DL (ref 12–15.9)
IMM GRANULOCYTES # BLD AUTO: 0.03 10*3/MM3 (ref 0–0.05)
IMM GRANULOCYTES NFR BLD AUTO: 0.4 % (ref 0–0.5)
LYMPHOCYTES # BLD AUTO: 1.01 10*3/MM3 (ref 0.7–3.1)
LYMPHOCYTES NFR BLD AUTO: 12.6 % (ref 19.6–45.3)
MCH RBC QN AUTO: 28.6 PG (ref 26.6–33)
MCHC RBC AUTO-ENTMCNC: 32.2 G/DL (ref 31.5–35.7)
MCV RBC AUTO: 88.9 FL (ref 79–97)
MONOCYTES # BLD AUTO: 0.75 10*3/MM3 (ref 0.1–0.9)
MONOCYTES NFR BLD AUTO: 9.3 % (ref 5–12)
NEUTROPHILS NFR BLD AUTO: 6.19 10*3/MM3 (ref 1.7–7)
NEUTROPHILS NFR BLD AUTO: 77.1 % (ref 42.7–76)
NRBC BLD AUTO-RTO: 0 /100 WBC (ref 0–0.2)
PLATELET # BLD AUTO: 125 10*3/MM3 (ref 140–450)
PMV BLD AUTO: 12.3 FL (ref 6–12)
RBC # BLD AUTO: 4.16 10*6/MM3 (ref 3.77–5.28)
WBC # BLD AUTO: 8.03 10*3/MM3 (ref 3.4–10.8)

## 2020-10-05 PROCEDURE — 80053 COMPREHEN METABOLIC PANEL: CPT

## 2020-10-05 PROCEDURE — 82550 ASSAY OF CK (CPK): CPT

## 2020-10-05 PROCEDURE — 85025 COMPLETE CBC W/AUTO DIFF WBC: CPT

## 2020-10-05 PROCEDURE — 36415 COLL VENOUS BLD VENIPUNCTURE: CPT

## 2020-10-06 LAB
ALBUMIN SERPL-MCNC: 4.4 G/DL (ref 3.5–5.2)
ALBUMIN/GLOB SERPL: 2.3 G/DL
ALP SERPL-CCNC: 118 U/L (ref 39–117)
ALT SERPL W P-5'-P-CCNC: 26 U/L (ref 1–33)
ANION GAP SERPL CALCULATED.3IONS-SCNC: 13.6 MMOL/L (ref 5–15)
AST SERPL-CCNC: 30 U/L (ref 1–32)
BILIRUB SERPL-MCNC: 0.2 MG/DL (ref 0–1.2)
BUN SERPL-MCNC: 7 MG/DL (ref 6–20)
BUN/CREAT SERPL: 11.9 (ref 7–25)
CALCIUM SPEC-SCNC: 8.9 MG/DL (ref 8.6–10.5)
CHLORIDE SERPL-SCNC: 111 MMOL/L (ref 98–107)
CK SERPL-CCNC: 159 U/L (ref 20–180)
CO2 SERPL-SCNC: 20.4 MMOL/L (ref 22–29)
CREAT SERPL-MCNC: 0.59 MG/DL (ref 0.57–1)
GFR SERPL CREATININE-BSD FRML MDRD: 118 ML/MIN/1.73
GLOBULIN UR ELPH-MCNC: 1.9 GM/DL
GLUCOSE SERPL-MCNC: 72 MG/DL (ref 65–99)
POTASSIUM SERPL-SCNC: 3.5 MMOL/L (ref 3.5–5.2)
PROT SERPL-MCNC: 6.3 G/DL (ref 6–8.5)
SODIUM SERPL-SCNC: 145 MMOL/L (ref 136–145)

## 2020-12-02 ENCOUNTER — LAB (OUTPATIENT)
Dept: LAB | Facility: HOSPITAL | Age: 30
End: 2020-12-02

## 2020-12-02 ENCOUNTER — TRANSCRIBE ORDERS (OUTPATIENT)
Dept: ADMINISTRATIVE | Facility: HOSPITAL | Age: 30
End: 2020-12-02

## 2020-12-02 DIAGNOSIS — Z79.899 ENCOUNTER FOR LONG-TERM (CURRENT) USE OF HIGH-RISK MEDICATION: ICD-10-CM

## 2020-12-02 DIAGNOSIS — Z79.899 ENCOUNTER FOR LONG-TERM (CURRENT) USE OF HIGH-RISK MEDICATION: Primary | ICD-10-CM

## 2020-12-02 LAB
ALBUMIN SERPL-MCNC: 4.7 G/DL (ref 3.5–5.2)
ALBUMIN/GLOB SERPL: 2 G/DL
ALP SERPL-CCNC: 123 U/L (ref 39–117)
ALT SERPL W P-5'-P-CCNC: 36 U/L (ref 1–33)
ANION GAP SERPL CALCULATED.3IONS-SCNC: 10.3 MMOL/L (ref 5–15)
AST SERPL-CCNC: 29 U/L (ref 1–32)
BASOPHILS # BLD AUTO: 0.01 10*3/MM3 (ref 0–0.2)
BASOPHILS NFR BLD AUTO: 0.2 % (ref 0–1.5)
BILIRUB SERPL-MCNC: 0.4 MG/DL (ref 0–1.2)
BUN SERPL-MCNC: 10 MG/DL (ref 6–20)
BUN/CREAT SERPL: 19.6 (ref 7–25)
CALCIUM SPEC-SCNC: 9.5 MG/DL (ref 8.6–10.5)
CHLORIDE SERPL-SCNC: 104 MMOL/L (ref 98–107)
CK SERPL-CCNC: 183 U/L (ref 20–180)
CO2 SERPL-SCNC: 27.7 MMOL/L (ref 22–29)
CREAT SERPL-MCNC: 0.51 MG/DL (ref 0.57–1)
DEPRECATED RDW RBC AUTO: 43.9 FL (ref 37–54)
EOSINOPHIL # BLD AUTO: 0.01 10*3/MM3 (ref 0–0.4)
EOSINOPHIL NFR BLD AUTO: 0.2 % (ref 0.3–6.2)
ERYTHROCYTE [DISTWIDTH] IN BLOOD BY AUTOMATED COUNT: 13.8 % (ref 12.3–15.4)
GFR SERPL CREATININE-BSD FRML MDRD: 140 ML/MIN/1.73
GLOBULIN UR ELPH-MCNC: 2.4 GM/DL
GLUCOSE SERPL-MCNC: 92 MG/DL (ref 65–99)
HCT VFR BLD AUTO: 39.5 % (ref 34–46.6)
HGB BLD-MCNC: 13 G/DL (ref 12–15.9)
IMM GRANULOCYTES # BLD AUTO: 0.02 10*3/MM3 (ref 0–0.05)
IMM GRANULOCYTES NFR BLD AUTO: 0.3 % (ref 0–0.5)
LYMPHOCYTES # BLD AUTO: 0.97 10*3/MM3 (ref 0.7–3.1)
LYMPHOCYTES NFR BLD AUTO: 16.4 % (ref 19.6–45.3)
MCH RBC QN AUTO: 28.7 PG (ref 26.6–33)
MCHC RBC AUTO-ENTMCNC: 32.9 G/DL (ref 31.5–35.7)
MCV RBC AUTO: 87.2 FL (ref 79–97)
MONOCYTES # BLD AUTO: 0.41 10*3/MM3 (ref 0.1–0.9)
MONOCYTES NFR BLD AUTO: 6.9 % (ref 5–12)
NEUTROPHILS NFR BLD AUTO: 4.51 10*3/MM3 (ref 1.7–7)
NEUTROPHILS NFR BLD AUTO: 76 % (ref 42.7–76)
NRBC BLD AUTO-RTO: 0 /100 WBC (ref 0–0.2)
PLATELET # BLD AUTO: 196 10*3/MM3 (ref 140–450)
PMV BLD AUTO: 11.8 FL (ref 6–12)
POTASSIUM SERPL-SCNC: 4 MMOL/L (ref 3.5–5.2)
PROT SERPL-MCNC: 7.1 G/DL (ref 6–8.5)
RBC # BLD AUTO: 4.53 10*6/MM3 (ref 3.77–5.28)
SODIUM SERPL-SCNC: 142 MMOL/L (ref 136–145)
WBC # BLD AUTO: 5.93 10*3/MM3 (ref 3.4–10.8)

## 2020-12-02 PROCEDURE — 82550 ASSAY OF CK (CPK): CPT

## 2020-12-02 PROCEDURE — 85025 COMPLETE CBC W/AUTO DIFF WBC: CPT

## 2020-12-02 PROCEDURE — 80053 COMPREHEN METABOLIC PANEL: CPT

## 2020-12-02 PROCEDURE — 36415 COLL VENOUS BLD VENIPUNCTURE: CPT

## 2020-12-24 ENCOUNTER — APPOINTMENT (OUTPATIENT)
Dept: CT IMAGING | Facility: HOSPITAL | Age: 30
End: 2020-12-24

## 2020-12-24 ENCOUNTER — HOSPITAL ENCOUNTER (EMERGENCY)
Facility: HOSPITAL | Age: 30
Discharge: HOME | End: 2020-12-24
Attending: EMERGENCY MEDICINE | Admitting: EMERGENCY MEDICINE

## 2020-12-24 VITALS
RESPIRATION RATE: 16 BRPM | BODY MASS INDEX: 22.47 KG/M2 | TEMPERATURE: 99 F | SYSTOLIC BLOOD PRESSURE: 112 MMHG | HEART RATE: 80 BPM | WEIGHT: 119 LBS | OXYGEN SATURATION: 100 % | DIASTOLIC BLOOD PRESSURE: 79 MMHG | HEIGHT: 61 IN

## 2020-12-24 DIAGNOSIS — N20.1 URETEROLITHIASIS: Primary | ICD-10-CM

## 2020-12-24 LAB
ANION GAP SERPL CALCULATED.3IONS-SCNC: 10.2 MMOL/L (ref 5–15)
B-HCG UR QL: NEGATIVE
BACTERIA UR QL AUTO: ABNORMAL /HPF
BASOPHILS # BLD AUTO: 0.01 10*3/MM3 (ref 0–0.2)
BASOPHILS NFR BLD AUTO: 0.2 % (ref 0–1.5)
BILIRUB UR QL STRIP: ABNORMAL
BUN SERPL-MCNC: 10 MG/DL (ref 6–20)
BUN/CREAT SERPL: 15.9 (ref 7–25)
CALCIUM SPEC-SCNC: 8.7 MG/DL (ref 8.6–10.5)
CHLORIDE SERPL-SCNC: 101 MMOL/L (ref 98–107)
CLARITY UR: ABNORMAL
CO2 SERPL-SCNC: 25.8 MMOL/L (ref 22–29)
COLOR UR: ABNORMAL
CREAT SERPL-MCNC: 0.63 MG/DL (ref 0.57–1)
DEPRECATED RDW RBC AUTO: 45.7 FL (ref 37–54)
EOSINOPHIL # BLD AUTO: 0.01 10*3/MM3 (ref 0–0.4)
EOSINOPHIL NFR BLD AUTO: 0.2 % (ref 0.3–6.2)
ERYTHROCYTE [DISTWIDTH] IN BLOOD BY AUTOMATED COUNT: 14.3 % (ref 12.3–15.4)
GFR SERPL CREATININE-BSD FRML MDRD: 110 ML/MIN/1.73
GLUCOSE SERPL-MCNC: 104 MG/DL (ref 65–99)
GLUCOSE UR STRIP-MCNC: NEGATIVE MG/DL
HCT VFR BLD AUTO: 37.7 % (ref 34–46.6)
HGB BLD-MCNC: 12.5 G/DL (ref 12–15.9)
HGB UR QL STRIP.AUTO: ABNORMAL
HYALINE CASTS UR QL AUTO: ABNORMAL /LPF
IMM GRANULOCYTES # BLD AUTO: 0.02 10*3/MM3 (ref 0–0.05)
IMM GRANULOCYTES NFR BLD AUTO: 0.3 % (ref 0–0.5)
KETONES UR QL STRIP: NEGATIVE
LEUKOCYTE ESTERASE UR QL STRIP.AUTO: NEGATIVE
LYMPHOCYTES # BLD AUTO: 0.7 10*3/MM3 (ref 0.7–3.1)
LYMPHOCYTES NFR BLD AUTO: 11 % (ref 19.6–45.3)
MCH RBC QN AUTO: 29.2 PG (ref 26.6–33)
MCHC RBC AUTO-ENTMCNC: 33.2 G/DL (ref 31.5–35.7)
MCV RBC AUTO: 88.1 FL (ref 79–97)
MONOCYTES # BLD AUTO: 0.51 10*3/MM3 (ref 0.1–0.9)
MONOCYTES NFR BLD AUTO: 8 % (ref 5–12)
NEUTROPHILS NFR BLD AUTO: 5.11 10*3/MM3 (ref 1.7–7)
NEUTROPHILS NFR BLD AUTO: 80.3 % (ref 42.7–76)
NITRITE UR QL STRIP: NEGATIVE
NRBC BLD AUTO-RTO: 0 /100 WBC (ref 0–0.2)
PH UR STRIP.AUTO: 6 [PH] (ref 4.5–8)
PLATELET # BLD AUTO: 162 10*3/MM3 (ref 140–450)
PMV BLD AUTO: 10.9 FL (ref 6–12)
POTASSIUM SERPL-SCNC: 3.7 MMOL/L (ref 3.5–5.2)
PROT UR QL STRIP: ABNORMAL
RBC # BLD AUTO: 4.28 10*6/MM3 (ref 3.77–5.28)
RBC # UR: ABNORMAL /HPF
REF LAB TEST METHOD: ABNORMAL
SODIUM SERPL-SCNC: 137 MMOL/L (ref 136–145)
SP GR UR STRIP: 1.02 (ref 1–1.03)
SQUAMOUS #/AREA URNS HPF: ABNORMAL /HPF
UROBILINOGEN UR QL STRIP: ABNORMAL
WBC # BLD AUTO: 6.36 10*3/MM3 (ref 3.4–10.8)
WBC UR QL AUTO: ABNORMAL /HPF

## 2020-12-24 PROCEDURE — 25010000002 KETOROLAC TROMETHAMINE PER 15 MG: Performed by: EMERGENCY MEDICINE

## 2020-12-24 PROCEDURE — 25010000002 CEFTRIAXONE SODIUM-DEXTROSE 1-3.74 GM-%(50ML) RECONSTITUTED SOLUTION: Performed by: EMERGENCY MEDICINE

## 2020-12-24 PROCEDURE — 99283 EMERGENCY DEPT VISIT LOW MDM: CPT

## 2020-12-24 PROCEDURE — 96375 TX/PRO/DX INJ NEW DRUG ADDON: CPT

## 2020-12-24 PROCEDURE — 81025 URINE PREGNANCY TEST: CPT | Performed by: EMERGENCY MEDICINE

## 2020-12-24 PROCEDURE — 99284 EMERGENCY DEPT VISIT MOD MDM: CPT | Performed by: EMERGENCY MEDICINE

## 2020-12-24 PROCEDURE — 25010000002 ONDANSETRON PER 1 MG: Performed by: EMERGENCY MEDICINE

## 2020-12-24 PROCEDURE — 85025 COMPLETE CBC W/AUTO DIFF WBC: CPT | Performed by: EMERGENCY MEDICINE

## 2020-12-24 PROCEDURE — 80048 BASIC METABOLIC PNL TOTAL CA: CPT | Performed by: EMERGENCY MEDICINE

## 2020-12-24 PROCEDURE — 74176 CT ABD & PELVIS W/O CONTRAST: CPT

## 2020-12-24 PROCEDURE — 81001 URINALYSIS AUTO W/SCOPE: CPT | Performed by: EMERGENCY MEDICINE

## 2020-12-24 PROCEDURE — 96365 THER/PROPH/DIAG IV INF INIT: CPT

## 2020-12-24 RX ORDER — ONDANSETRON 4 MG/1
4 TABLET, ORALLY DISINTEGRATING ORAL 4 TIMES DAILY PRN
Qty: 10 TABLET | Refills: 0 | Status: SHIPPED | OUTPATIENT
Start: 2020-12-24 | End: 2021-02-02

## 2020-12-24 RX ORDER — TAMSULOSIN HYDROCHLORIDE 0.4 MG/1
1 CAPSULE ORAL DAILY
Qty: 30 CAPSULE | Refills: 0 | Status: SHIPPED | OUTPATIENT
Start: 2020-12-24 | End: 2021-02-02

## 2020-12-24 RX ORDER — OXYCODONE HYDROCHLORIDE AND ACETAMINOPHEN 5; 325 MG/1; MG/1
1 TABLET ORAL EVERY 6 HOURS PRN
Qty: 12 TABLET | Refills: 0 | Status: SHIPPED | OUTPATIENT
Start: 2020-12-24 | End: 2021-02-02

## 2020-12-24 RX ORDER — KETOROLAC TROMETHAMINE 30 MG/ML
15 INJECTION, SOLUTION INTRAMUSCULAR; INTRAVENOUS ONCE
Status: COMPLETED | OUTPATIENT
Start: 2020-12-24 | End: 2020-12-24

## 2020-12-24 RX ORDER — CEFUROXIME AXETIL 250 MG/1
250 TABLET ORAL 2 TIMES DAILY
Qty: 14 TABLET | Refills: 0 | Status: SHIPPED | OUTPATIENT
Start: 2020-12-24 | End: 2021-02-02

## 2020-12-24 RX ORDER — SODIUM CHLORIDE 0.9 % (FLUSH) 0.9 %
10 SYRINGE (ML) INJECTION AS NEEDED
Status: DISCONTINUED | OUTPATIENT
Start: 2020-12-24 | End: 2020-12-24 | Stop reason: HOSPADM

## 2020-12-24 RX ORDER — ONDANSETRON 2 MG/ML
4 INJECTION INTRAMUSCULAR; INTRAVENOUS ONCE
Status: COMPLETED | OUTPATIENT
Start: 2020-12-24 | End: 2020-12-24

## 2020-12-24 RX ORDER — CEFTRIAXONE 1 G/50ML
1 INJECTION, SOLUTION INTRAVENOUS ONCE
Status: COMPLETED | OUTPATIENT
Start: 2020-12-24 | End: 2020-12-24

## 2020-12-24 RX ORDER — SODIUM CHLORIDE, SODIUM LACTATE, POTASSIUM CHLORIDE, CALCIUM CHLORIDE 600; 310; 30; 20 MG/100ML; MG/100ML; MG/100ML; MG/100ML
500 INJECTION, SOLUTION INTRAVENOUS CONTINUOUS
Status: DISCONTINUED | OUTPATIENT
Start: 2020-12-24 | End: 2020-12-24 | Stop reason: HOSPADM

## 2020-12-24 RX ADMIN — SODIUM CHLORIDE, POTASSIUM CHLORIDE, SODIUM LACTATE AND CALCIUM CHLORIDE 500 ML: 600; 310; 30; 20 INJECTION, SOLUTION INTRAVENOUS at 18:30

## 2020-12-24 RX ADMIN — KETOROLAC TROMETHAMINE 15 MG: 30 INJECTION, SOLUTION INTRAMUSCULAR; INTRAVENOUS at 18:29

## 2020-12-24 RX ADMIN — ONDANSETRON 4 MG: 2 INJECTION, SOLUTION INTRAMUSCULAR; INTRAVENOUS at 18:29

## 2020-12-24 RX ADMIN — SODIUM CHLORIDE, POTASSIUM CHLORIDE, SODIUM LACTATE AND CALCIUM CHLORIDE 500 ML: 600; 310; 30; 20 INJECTION, SOLUTION INTRAVENOUS at 19:18

## 2020-12-24 RX ADMIN — CEFTRIAXONE 1 G: 1 INJECTION, SOLUTION INTRAVENOUS at 19:45

## 2020-12-24 NOTE — ED NOTES
Right side flank pain, recently finished antibiotic for UTI, no urinary issues reported at this time. Hx of kidney stones.      Bev Bradley RN  12/24/20 3622

## 2020-12-25 NOTE — DISCHARGE INSTRUCTIONS
Return to the emergency room if you develop fever, unrelenting pain, uncontrollable vomiting or for any other concerns.

## 2020-12-25 NOTE — ED PROVIDER NOTES
EMERGENCY DEPARTMENT ENCOUNTER      Room Number: 07/07      HPI:    Chief complaint: Flank pain    Location: Right flank    Quality/Severity: Fluctuates from mild to severe, sharp in nature    Timing/Duration: Started about 3 PM today    Modifying Factors: None    Associated Symptoms: Nausea    Narrative: Pt is a 30 y.o. female who presents complaining of intermittent waxing and waning flank pain in the right flank.  She says that sharp without radiation.  Associated with nausea no vomiting, no fevers no dysuria.  She says she finished antibiotics for urinary tract infection approximately 7 days ago.  Has not been having any urinary symptoms since then and still does not have any.  She says she did see some blood in the urine but specifically denies dysuria, frequency, hesitancy      PMD: Hang Jordan MD    REVIEW OF SYSTEMS  Review of Systems   Constitutional: Negative for activity change, appetite change, chills and fever.   Respiratory: Negative for chest tightness and shortness of breath.    Cardiovascular: Negative for chest pain and leg swelling.   Gastrointestinal: Negative for abdominal pain, nausea and vomiting.   Genitourinary: Negative for difficulty urinating and dysuria.   Musculoskeletal: Negative for arthralgias and joint swelling.   Skin: Negative for color change and rash.   Neurological: Negative for dizziness and weakness.   Hematological: Negative for adenopathy. Does not bruise/bleed easily.   Psychiatric/Behavioral: Negative for agitation and confusion.         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   • Bronchitis 06/13/2014   • Depressive disorder 08/09/2017   • Myositis 01/30/2018   • Poor venous access 06/27/2018   • Port-A-Cath in place 12/17/2019     Resolved Ambulatory Problems     Diagnosis Date Noted   • No Resolved Ambulatory Problems     Past Medical History:   Diagnosis Date   • Chronic sore  throat    • GERD (gastroesophageal reflux disease)    • Kidney stones    • Migraine    • Shingles        PAST SURGICAL HISTORY  Past Surgical History:   Procedure Laterality Date   • ADENOIDECTOMY     • EAR TUBES     • EYE SURGERY Right     eyelid reconstruction   • MUSCLE BIOPSY  2014    thigh right   • SKIN BIOPSY      right thigh   • VENOUS ACCESS DEVICE (PORT) INSERTION N/A 2018    Procedure: INSERTION VENOUS ACCESS DEVICE;  Surgeon: Bo Thomas MD;  Location:  [facility];  Service: General   • VENOUS ACCESS DEVICE (PORT) REMOVAL Right 2020    Procedure: REMOVAL VENOUS ACCESS DEVICE;  Surgeon: Bo Thomas MD;  Location:  [facility];  Service: General;  Laterality: Right;  REMOVAL VENOUS ACCESS DEVICE       FAMILY HISTORY  Family History   Problem Relation Age of Onset   • Hyperlipidemia Mother    • Hypertension Mother    • Lung cancer Father    • Cancer Father        SOCIAL HISTORY  Social History     Socioeconomic History   • Marital status:      Spouse name: Not on file   • Number of children: Not on file   • Years of education: Not on file   • Highest education level: Not on file   Tobacco Use   • Smoking status: Former Smoker     Packs/day: 0.50     Years: 15.00     Pack years: 7.50     Types: Cigarettes     Quit date: 3/13/2014     Years since quittin.7   • Smokeless tobacco: Never Used   Substance and Sexual Activity   • Alcohol use: No   • Drug use: Never   • Sexual activity: Defer       ALLERGIES  Patient has no known allergies.      Current Facility-Administered Medications:   •  cefTRIAXone (ROCEPHIN) IVPB 1 g/50ml dextrose (premix), 1 g, Intravenous, Once, Angelo Rojas MD, Last Rate: 100 mL/hr at 20, 1 g at 20  •  lactated ringers infusion 500 mL, 500 mL, Intravenous, Continuous, Angelo Rojas MD, Stopped at 20  •  [COMPLETED] Insert peripheral IV, , , Once **AND** sodium chloride 0.9 % flush 10 mL, 10 mL, Intravenous, PRN, Bob  MD Angelo    Current Outpatient Medications:   •  azaTHIOprine (IMURAN) 50 MG tablet, Take 150 mg by mouth Every Morning., Disp: , Rfl:   •  butalbital-acetaminophen-caffeine (FIORICET, ESGIC) -40 MG per tablet, butalbital-acetaminophen-caffeine 50 mg-325 mg-40 mg tablet  TAKE 1 TABLET BY MOUTH EVERY 6 HOURS AS NEEDED, Disp: , Rfl:   •  Calcium Carb-Cholecalciferol (CALCIUM 600 + D) 600-200 MG-UNIT tablet, Take 1 tablet by mouth 2 (Two) Times a Day., Disp: , Rfl:   •  cefuroxime (CEFTIN) 250 MG tablet, Take 1 tablet by mouth 2 (Two) Times a Day., Disp: 14 tablet, Rfl: 0  •  ferrous sulfate 325 (65 FE) MG tablet, ferrous sulfate 325 mg (65 mg iron) tablet  Take 1 tablet every day by oral route for 90 days., Disp: , Rfl:   •  methylPREDNISolone (MEDROL) 4 MG tablet, Take 2 mg by mouth Daily. Alternates 2 mg and 4 mg, Disp: , Rfl:   •  mycophenolate (CELLCEPT) 500 MG tablet, Take 1,500 mg by mouth Every 12 (Twelve) Hours., Disp: , Rfl:   •  ondansetron ODT (ZOFRAN-ODT) 4 MG disintegrating tablet, Place 1 tablet on the tongue 4 (Four) Times a Day As Needed for Nausea or Vomiting., Disp: 10 tablet, Rfl: 0  •  oxyCODONE-acetaminophen (PERCOCET) 5-325 MG per tablet, Take 1 tablet by mouth Every 6 (Six) Hours As Needed for Severe Pain ., Disp: 12 tablet, Rfl: 0  •  riTUXimab (RITUXAN IV), Infuse 1 dose into a venous catheter Every 6 (Six) Months., Disp: , Rfl:   •  tamsulosin (FLOMAX) 0.4 MG capsule 24 hr capsule, Take 1 capsule by mouth Daily., Disp: 30 capsule, Rfl: 0  •  zolpidem (AMBIEN) 10 MG tablet, zolpidem 10 mg tablet, Disp: , Rfl:     PHYSICAL EXAM  ED Triage Vitals   Temp Heart Rate Resp BP SpO2   12/24/20 1807 12/24/20 1801 12/24/20 1801 12/24/20 1801 12/24/20 1801   99 °F (37.2 °C) 100 18 137/89 100 %      Temp src Heart Rate Source Patient Position BP Location FiO2 (%)   12/24/20 1807 12/24/20 1801 12/24/20 1801 12/24/20 1801 --   Oral Monitor Sitting Right arm        Physical Exam  INITIAL VITAL SIGNS:  Reviewed by me.  Pulse ox normal  GENERAL: Alert and interactive. No acute distress appears in mild discomfort  HEAD: Head is normocephalic.  EYES: EOMI. PERRL. No scleral icterus. No conjunctival injection.  ENT: Moist mucous membranes.   NECK: Supple. Full range of motion.  RESPIRATORY: No tachypnea. Clear breath sounds bilaterally. No wheezing. No rales. No rhonchi.  CV: Regular rate and rhythm. No murmurs. No rubs or gallops.  ABDOMEN: Soft, non-distended, non-tender. No guarding. No rebound. No masses.   BACK:  No obvious deformity.  No CVA tenderness  EXTREMITIES: No deformity. No clubbing or cyanosis. No edema.   SKIN: Warm and dry. No diaphoresis. No obvious rashes.   NEUROLOGIC: Alert and oriented. Face is symmetric. Speech is normal.       LAB RESULTS  Results for orders placed or performed during the hospital encounter of 12/24/20   Pregnancy, Urine - Urine, Clean Catch    Specimen: Urine, Clean Catch   Result Value Ref Range    HCG, Urine QL Negative Negative   Urinalysis With Microscopic If Indicated (No Culture) - Urine, Clean Catch    Specimen: Urine, Clean Catch   Result Value Ref Range    Color, UA Red (A) Yellow, Straw    Appearance, UA Cloudy (A) Clear    pH, UA 6.0 4.5 - 8.0    Specific Gravity, UA 1.020 1.003 - 1.030    Glucose, UA Negative Negative    Ketones, UA Negative Negative    Bilirubin, UA Small (1+) (A) Negative    Blood, UA Large (3+) (A) Negative    Protein,  mg/dL (2+) (A) Negative    Leuk Esterase, UA Negative Negative    Nitrite, UA Negative Negative    Urobilinogen, UA 0.2 E.U./dL 0.2 - 1.0 E.U./dL   Basic Metabolic Panel    Specimen: Blood   Result Value Ref Range    Glucose 104 (H) 65 - 99 mg/dL    BUN 10 6 - 20 mg/dL    Creatinine 0.63 0.57 - 1.00 mg/dL    Sodium 137 136 - 145 mmol/L    Potassium 3.7 3.5 - 5.2 mmol/L    Chloride 101 98 - 107 mmol/L    CO2 25.8 22.0 - 29.0 mmol/L    Calcium 8.7 8.6 - 10.5 mg/dL    eGFR Non African Amer 110 >60 mL/min/1.73     BUN/Creatinine Ratio 15.9 7.0 - 25.0    Anion Gap 10.2 5.0 - 15.0 mmol/L   CBC Auto Differential    Specimen: Blood   Result Value Ref Range    WBC 6.36 3.40 - 10.80 10*3/mm3    RBC 4.28 3.77 - 5.28 10*6/mm3    Hemoglobin 12.5 12.0 - 15.9 g/dL    Hematocrit 37.7 34.0 - 46.6 %    MCV 88.1 79.0 - 97.0 fL    MCH 29.2 26.6 - 33.0 pg    MCHC 33.2 31.5 - 35.7 g/dL    RDW 14.3 12.3 - 15.4 %    RDW-SD 45.7 37.0 - 54.0 fl    MPV 10.9 6.0 - 12.0 fL    Platelets 162 140 - 450 10*3/mm3    Neutrophil % 80.3 (H) 42.7 - 76.0 %    Lymphocyte % 11.0 (L) 19.6 - 45.3 %    Monocyte % 8.0 5.0 - 12.0 %    Eosinophil % 0.2 (L) 0.3 - 6.2 %    Basophil % 0.2 0.0 - 1.5 %    Immature Grans % 0.3 0.0 - 0.5 %    Neutrophils, Absolute 5.11 1.70 - 7.00 10*3/mm3    Lymphocytes, Absolute 0.70 0.70 - 3.10 10*3/mm3    Monocytes, Absolute 0.51 0.10 - 0.90 10*3/mm3    Eosinophils, Absolute 0.01 0.00 - 0.40 10*3/mm3    Basophils, Absolute 0.01 0.00 - 0.20 10*3/mm3    Immature Grans, Absolute 0.02 0.00 - 0.05 10*3/mm3    nRBC 0.0 0.0 - 0.2 /100 WBC   Urinalysis, Microscopic Only - Urine, Clean Catch    Specimen: Urine, Clean Catch   Result Value Ref Range    RBC, UA Too Numerous to Count (A) None Seen /HPF    WBC, UA 3-5 (A) None Seen /HPF    Bacteria, UA 1+ (A) None Seen /HPF    Squamous Epithelial Cells, UA 0-2 None Seen, 0-2 /HPF    Hyaline Casts, UA None Seen None Seen /LPF    Methodology Manual Light Microscopy          I ordered the above labs and reviewed the results    RADIOLOGY  Ct Abdomen Pelvis Without Contrast    Result Date: 12/24/2020  CT Abdomen Pelvis WO INDICATION: 30-year-old female with flank pain on the right since 1500 hours. Gross hematuria. No history of prior abdominal surgery. TECHNIQUE: CT of the abdomen and pelvis without IV contrast. Coronal and sagittal reconstructions were obtained.  Radiation dose reduction techniques included automated exposure control or exposure modulation based on body size. Count of known CT and  cardiac nuc med studies performed in previous 12 months: 0. COMPARISON: 1/2/2019 FINDINGS: Abdomen: Included lung bases are clear. There is some minimal chronic atelectasis or scarring in the left base. No effusion. Normal caliber aorta and atherosclerotic change. The spleen is enlarged measuring over 13 cm. Negative adrenal glands and the pancreas is negative. The gallbladder is contracted. The liver is negative. There is moderately severe hydronephrosis and proximal hydroureter on the right secondary to an obstructing 8 x 5 mm Stone at the right UPJ level. Additional nonobstructing stones in the right kidney measuring up to about 4-5 mm. The left kidney is nonobstructed. Punctate nonobstructing stone in the midpole left kidney. No adenopathy. Pelvis: Negative bladder. No drainable fluid collection in the pelvis. The bowel is nonobstructed and the appendix is normal. Negative inguinal canals. No suspicious bone lesion.     1. Moderately severe hydronephrosis of the right kidney secondary to an obstructing stone measuring 8 x 5 at the right UPJ level. Urologic referral recommended. 2. Additional nonobstructing stones bilaterally. 3. Normal appendix. Signer Name: Mao Molina MD  Signed: 12/24/2020 7:16 PM  Workstation Name: [email or web address]  [facility]      I ordered the above radiologic testing and reviewed the results    PROCEDURES  Procedures      PROGRESS AND CONSULTS           MEDICAL DECISION MAKING      MDM   30-year-old female presents to the ER complaining of right-sided flank pain since approximately 3 PM today.  She says it waxes and wanes but has not gone away.  She has some associated nausea but no vomiting.  She denies dysuria denies urinary frequency and denies fevers.  She says she does have a history of some kidney stones.    No evidence of urinary tract infection but there are lots of red blood cells in it, no elevation in white count and metabolic panel shows normal kidney  function.  CT scan shows moderately severe hydrodue to a 5 by 8stone at the right UPJ.  Talked with Dr. Giles of [facility] [place] [facility]rology who feels patient can be given a trial of passage.  He recommended some urinary antibiotics in addition to pain control and nausea control as she has  hydronephrosis.  Discussed with the patient who is currently feeling much better and she agrees with plan and has no questions.    DIAGNOSIS  Final diagnoses:   Ureterolithiasis         DISPOSITION  home      Discussed pertinent labs and imaging findings with the patient/family.  Patient/Family voiced understanding of need to follow-up for recheck, further testing as needed.  Return to the emergency Department warnings were given.         Medication List      New Prescriptions    cefuroxime 250 MG tablet  Commonly known as: CEFTIN  Take 1 tablet by mouth 2 (Two) Times a Day.     ondansetron ODT 4 MG disintegrating tablet  Commonly known as: ZOFRAN-ODT  Place 1 tablet on the tongue 4 (Four) Times a Day As Needed for Nausea or Vomiting.     oxyCODONE-acetaminophen 5-325 MG per tablet  Commonly known as: PERCOCET  Take 1 tablet by mouth Every 6 (Six) Hours As Needed for Severe Pain .     tamsulosin 0.4 MG capsule 24 hr capsule  Commonly known as: FLOMAX  Take 1 capsule by mouth Daily.           Where to Get Your Medications      You can get these medications from any pharmacy    Bring a paper prescription for each of these medications  · cefuroxime 250 MG tablet  · ondansetron ODT 4 MG disintegrating tablet  · oxyCODONE-acetaminophen 5-325 MG per tablet  · tamsulosin 0.4 MG capsule 24 hr capsule             Follow-up Information     Call  Jarrett Amaro MD.    Specialty: Urology  Why: Monday morning to schedule follow-up appointment  Contact information:  [address] [address]  [place] [address] [phone]                     Dictated utilizing Dragon dictation     Angelo Rojas MD  12/24/20 1958

## 2020-12-25 NOTE — ED NOTES
Urology paged for consult. Spoke with Miguel with answering service.     Thelma Strong, RN  12/24/20 1929

## 2021-01-16 ENCOUNTER — HOSPITAL ENCOUNTER (OUTPATIENT)
Dept: GENERAL RADIOLOGY | Facility: HOSPITAL | Age: 31
Discharge: HOME | End: 2021-01-16
Admitting: NURSE PRACTITIONER

## 2021-01-16 ENCOUNTER — TRANSCRIBE ORDERS (OUTPATIENT)
Dept: ADMINISTRATIVE | Facility: HOSPITAL | Age: 31
End: 2021-01-16

## 2021-01-16 DIAGNOSIS — M54.50 LOW BACK PAIN, UNSPECIFIED BACK PAIN LATERALITY, UNSPECIFIED CHRONICITY, UNSPECIFIED WHETHER SCIATICA PRESENT: ICD-10-CM

## 2021-01-16 DIAGNOSIS — M54.50 LOW BACK PAIN, UNSPECIFIED BACK PAIN LATERALITY, UNSPECIFIED CHRONICITY, UNSPECIFIED WHETHER SCIATICA PRESENT: Primary | ICD-10-CM

## 2021-01-16 PROCEDURE — 72100 X-RAY EXAM L-S SPINE 2/3 VWS: CPT

## 2021-02-02 ENCOUNTER — CONSULT (OUTPATIENT)
Dept: ONCOLOGY | Facility: CLINIC | Age: 31
End: 2021-02-02

## 2021-02-02 ENCOUNTER — LAB (OUTPATIENT)
Dept: LAB | Facility: HOSPITAL | Age: 31
End: 2021-02-02

## 2021-02-02 VITALS
HEART RATE: 93 BPM | HEIGHT: 61 IN | BODY MASS INDEX: 22.51 KG/M2 | WEIGHT: 119.2 LBS | TEMPERATURE: 98.4 F | OXYGEN SATURATION: 100 % | RESPIRATION RATE: 16 BRPM | SYSTOLIC BLOOD PRESSURE: 118 MMHG | DIASTOLIC BLOOD PRESSURE: 86 MMHG

## 2021-02-02 DIAGNOSIS — R16.1 SPLENOMEGALY: Primary | ICD-10-CM

## 2021-02-02 LAB
BASOPHILS # BLD AUTO: 0.04 10*3/MM3 (ref 0–0.2)
BASOPHILS NFR BLD AUTO: 0.6 % (ref 0–1.5)
DEPRECATED RDW RBC AUTO: 42.5 FL (ref 37–54)
EOSINOPHIL # BLD AUTO: 0.06 10*3/MM3 (ref 0–0.4)
EOSINOPHIL NFR BLD AUTO: 0.9 % (ref 0.3–6.2)
ERYTHROCYTE [DISTWIDTH] IN BLOOD BY AUTOMATED COUNT: 13.9 % (ref 12.3–15.4)
HCT VFR BLD AUTO: 39.4 % (ref 34–46.6)
HGB BLD-MCNC: 13.3 G/DL (ref 12–15.9)
IMM GRANULOCYTES # BLD AUTO: 0.02 10*3/MM3 (ref 0–0.05)
IMM GRANULOCYTES NFR BLD AUTO: 0.3 % (ref 0–0.5)
LYMPHOCYTES # BLD AUTO: 1.33 10*3/MM3 (ref 0.7–3.1)
LYMPHOCYTES NFR BLD AUTO: 19.6 % (ref 19.6–45.3)
MCH RBC QN AUTO: 28.9 PG (ref 26.6–33)
MCHC RBC AUTO-ENTMCNC: 33.8 G/DL (ref 31.5–35.7)
MCV RBC AUTO: 85.5 FL (ref 79–97)
MONOCYTES # BLD AUTO: 0.89 10*3/MM3 (ref 0.1–0.9)
MONOCYTES NFR BLD AUTO: 13.1 % (ref 5–12)
NEUTROPHILS NFR BLD AUTO: 4.46 10*3/MM3 (ref 1.7–7)
NEUTROPHILS NFR BLD AUTO: 65.5 % (ref 42.7–76)
NRBC BLD AUTO-RTO: 0 /100 WBC (ref 0–0.2)
PLATELET # BLD AUTO: 152 10*3/MM3 (ref 140–450)
PMV BLD AUTO: 11.6 FL (ref 6–12)
RBC # BLD AUTO: 4.61 10*6/MM3 (ref 3.77–5.28)
WBC # BLD AUTO: 6.8 10*3/MM3 (ref 3.4–10.8)

## 2021-02-02 PROCEDURE — 36415 COLL VENOUS BLD VENIPUNCTURE: CPT

## 2021-02-02 PROCEDURE — 85025 COMPLETE CBC W/AUTO DIFF WBC: CPT

## 2021-02-02 PROCEDURE — 99203 OFFICE O/P NEW LOW 30 MIN: CPT | Performed by: INTERNAL MEDICINE

## 2021-02-02 NOTE — PROGRESS NOTES
.     REASON FOR CONSULTATION:     Provide an opinion on any further workup or treatment of mild splenomegaly.                             REQUESTING PHYSICIAN: NURY Moreno     RECORDS OBTAINED:  Records of the patient's history including those obtained from the referring provider were reviewed and summarized in detail.    HISTORY OF PRESENT ILLNESS:  The patient is a 30 y.o. year old female with a history of antisynthetase syndrome, kidney stone was right side moderate hydronephrosis, and gastroesophageal reflux disease who presented today for initial evaluation because of incidental finding of mild splenomegaly on 12/24/2020.    Patient reports that she had gross hematuria and the right flank pain associated with a kidney stone.  She was in the emergency room on 12/24/2020.  CT examination foot abdomen and pelvis reported obstructing stone in the causes right hydronephrosis.  CT scan also showed enlarged spleen measuring over 13 cm.  She was about to see urologist, however she had a spontaneous passage of the stone which she found in her urine.  She reports no recurrent hematuria.  Denies fever sweating chills.  Her flank pain has resolved.    Nevertheless this patient is immunosuppressed, she has been taking Imuran 50 mg daily and also CellCept.  She also receives Rituxan treatment.  Patient reports she was recently weaned off oral steroids.    She reports having no palpable lymphadenopathy.  No low fever,.  She has lost significant monitor weight, since has been off steroids recently.  She currently is about 120 pounds, in the in early 2020, she weighted about 145 pounds.          Past Medical History:   Diagnosis Date   • 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CMS/HCC)    • Chronic sore throat     advised by PCP to have sleep study   • GERD (gastroesophageal reflux disease)     d/t steroids   • Kidney stones     history   • Migraine    • Myositis     monthly IVIG   • Shingles      Past Surgical History:    Procedure Laterality Date   • ADENOIDECTOMY     • EAR TUBES     • EYE SURGERY Right     eyelid reconstruction   • MUSCLE BIOPSY  2014    thigh right   • SKIN BIOPSY      right thigh   • VENOUS ACCESS DEVICE (PORT) INSERTION N/A 7/6/2018    Procedure: INSERTION VENOUS ACCESS DEVICE;  Surgeon: Bo Thomas MD;  Location:  [facility] OR;  Service: General   • VENOUS ACCESS DEVICE (PORT) REMOVAL Right 2/7/2020    Procedure: REMOVAL VENOUS ACCESS DEVICE;  Surgeon: Bo Thomas MD;  Location: [facility];  Service: General;  Laterality: Right;  REMOVAL VENOUS ACCESS DEVICE       MEDICATIONS    Current Outpatient Medications:   •  azaTHIOprine (IMURAN) 50 MG tablet, Take 150 mg by mouth Every Morning., Disp: , Rfl:   •  butalbital-acetaminophen-caffeine (FIORICET, ESGIC) -40 MG per tablet, butalbital-acetaminophen-caffeine 50 mg-325 mg-40 mg tablet  TAKE 1 TABLET BY MOUTH EVERY 6 HOURS AS NEEDED, Disp: , Rfl:   •  Calcium Carb-Cholecalciferol (CALCIUM 600 + D) 600-200 MG-UNIT tablet, Take 1 tablet by mouth 2 (Two) Times a Day., Disp: , Rfl:   •  ferrous sulfate 325 (65 FE) MG tablet, ferrous sulfate 325 mg (65 mg iron) tablet  Take 1 tablet every day by oral route for 90 days., Disp: , Rfl:   •  mycophenolate (CELLCEPT) 500 MG tablet, Take 1,500 mg by mouth Every 12 (Twelve) Hours., Disp: , Rfl:   •  riTUXimab (RITUXAN IV), Infuse 1 dose into a venous catheter Every 6 (Six) Months., Disp: , Rfl:     ALLERGIES:     Allergies   Allergen Reactions   • Sumatriptan Nausea Only     Chest pain       SOCIAL HISTORY:       Social History     Socioeconomic History   • Marital status:      Spouse name: Willits   • Number of children: Not on file   • Years of education: Not on file   • Highest education level: Not on file   Occupational History     Employer: UNEMPLOYED   Tobacco Use   • Smoking status: Former Smoker     Packs/day: 0.50     Years: 15.00     Pack years: 7.50     Types: Cigarettes     Quit date: 3/13/2014      "Years since quittin.8   • Smokeless tobacco: Never Used   Substance and Sexual Activity   • Alcohol use: No   • Drug use: Never   • Sexual activity: Defer         FAMILY HISTORY:  Family History   Problem Relation Age of Onset   • Hyperlipidemia Mother    • Hypertension Mother    • Lung cancer Father    • Cancer Father        REVIEW OF SYSTEMS:  Review of Systems   Constitutional: Negative for appetite change, diaphoresis, fatigue, fever and unexpected weight change (Significant weight loss because of weaning off from steroids).   HENT: Negative for mouth sores, nosebleeds and sore throat.    Eyes: Negative for photophobia and visual disturbance.   Respiratory: Negative for cough and shortness of breath.    Cardiovascular: Negative for chest pain and leg swelling.   Gastrointestinal: Negative for abdominal pain (This is resolved after passing out kidney stone), blood in stool and nausea.   Endocrine: Negative for cold intolerance and heat intolerance.   Genitourinary: Positive for hematuria (This has resolved). Negative for dysuria.   Musculoskeletal: Negative for arthralgias and joint swelling.   Skin: Negative for color change and pallor.   Allergic/Immunologic: Positive for immunocompromised state. Negative for environmental allergies.   Neurological: Negative for dizziness and headaches.   Hematological: Negative for adenopathy. Does not bruise/bleed easily.   Psychiatric/Behavioral: Negative for agitation and sleep disturbance.              Vitals:    21 1510   BP: 118/86   Pulse: 93   Resp: 16   Temp: 98.4 °F (36.9 °C)   SpO2: 100%   Weight: 54.1 kg (119 lb 3.2 oz)   Height: 156 cm (61.42\")   PainSc:   3   PainLoc: Comment: lower back     Current Status 2021   ECOG score 0      PHYSICAL EXAM:      CONSTITUTIONAL:  Vital signs reviewed.  Well-developed well-nourished female.  No distress, looks comfortable.  EYES:  Conjunctiva and lids unremarkable.  PERRLA  EARS,NOSE,MOUTH,THROAT: Patient wears " mask due to the pandemic coronavirus infection.   RESPIRATORY:  Normal respiratory effort.  Lungs clear to auscultation bilaterally.  CARDIOVASCULAR: Regular rhythm and the rate.  Normal S1, S2.  No murmurs rubs or gallops.  No significant lower extremity edema.  GASTROINTESTINAL: Abdomen appears unremarkable.  Nontender.  No hepatomegaly.  No splenomegaly.  Normal bowel sounds.  LYMPHATIC:  No cervical, supraclavicular, axillary lymphadenopathy.  MUSCULOSKELETAL:  Unremarkable gait and station.  Unremarkable digits/nails.  No cyanosis or clubbing.  SKIN:  Warm.  No rashes.  PSYCHIATRIC:  Normal judgment and insight.  Normal mood and affect.      RECENT LABS:    Lab Results   Component Value Date    WBC 6.80 02/02/2021    HGB 13.3 02/02/2021    HCT 39.4 02/02/2021    MCV 85.5 02/02/2021     02/02/2021     Lab Results   Component Value Date    NEUTROABS 4.46 02/02/2021     Glucose   Date Value Ref Range Status   12/24/2020 104 (H) 65 - 99 mg/dL Final     BUN   Date Value Ref Range Status   12/24/2020 10 6 - 20 mg/dL Final     Creatinine   Date Value Ref Range Status   12/24/2020 0.63 0.57 - 1.00 mg/dL Final     Sodium   Date Value Ref Range Status   12/24/2020 137 136 - 145 mmol/L Final     Potassium   Date Value Ref Range Status   12/24/2020 3.7 3.5 - 5.2 mmol/L Final     Chloride   Date Value Ref Range Status   12/24/2020 101 98 - 107 mmol/L Final     CO2   Date Value Ref Range Status   12/24/2020 25.8 22.0 - 29.0 mmol/L Final     Calcium   Date Value Ref Range Status   12/24/2020 8.7 8.6 - 10.5 mg/dL Final     Total Protein   Date Value Ref Range Status   12/02/2020 7.1 6.0 - 8.5 g/dL Final     Albumin   Date Value Ref Range Status   12/02/2020 4.70 3.50 - 5.20 g/dL Final     ALT (SGPT)   Date Value Ref Range Status   12/02/2020 36 (H) 1 - 33 U/L Final     AST (SGOT)   Date Value Ref Range Status   12/02/2020 29 1 - 32 U/L Final     Alkaline Phosphatase   Date Value Ref Range Status   12/02/2020 123 (H) 39  - 117 U/L Final     Total Bilirubin   Date Value Ref Range Status   12/02/2020 0.4 0.0 - 1.2 mg/dL Final     eGFR Non  Amer   Date Value Ref Range Status   12/24/2020 110 >60 mL/min/1.73 Final     BUN/Creatinine Ratio   Date Value Ref Range Status   12/24/2020 15.9 7.0 - 25.0 Final     Anion Gap   Date Value Ref Range Status   12/24/2020 10.2 5.0 - 15.0 mmol/L Final       IMAGING STUDY:  CT Abdomen Pelvis WO 12/24/2020     INDICATION:   30-year-old female with flank pain on the right since 1500 hours. Gross hematuria. No history of prior abdominal surgery.     COMPARISON:   1/2/2019     FINDINGS:  Abdomen: Included lung bases are clear. There is some minimal chronic atelectasis or scarring in the left base. No effusion. Normal caliber aorta and atherosclerotic change. The spleen is enlarged measuring over 13 cm. Negative adrenal glands and the  pancreas is negative. The gallbladder is contracted. The liver is negative. There is moderately severe hydronephrosis and proximal hydroureter on the right secondary to an obstructing 8 x 5 mm Stone at the right UPJ level. Additional nonobstructing  stones in the right kidney measuring up to about 4-5 mm. The left kidney is nonobstructed. Punctate nonobstructing stone in the midpole left kidney. No adenopathy.     Pelvis: Negative bladder. No drainable fluid collection in the pelvis. The bowel is nonobstructed and the appendix is normal. Negative inguinal canals. No suspicious bone lesion.     IMPRESSION:     1. Moderately severe hydronephrosis of the right kidney secondary to an obstructing stone measuring 8 x 5 at the right UPJ level. Urologic referral recommended.  2. Additional nonobstructing stones bilaterally.  3. Normal appendix.       Assessment/Plan    Incidental finding of mild splenomegaly 13 cm documented on CT scan from 12/24/2020 when she presented to emergency room because of right flank pain and was found to having obstructing kidney stone with  right-sided hydronephrosis.  She has since passed that the stone and with resolution of the pain.  She never had a pain in the left upper quadrant of her abdomen.    Patient reports no B symptoms.  She had a weight loss in the past 12 months because off weaning off from steroids.  Nevertheless she continues on immunosuppressive medication for her antisynthetase syndrome.    I reviewed the CT scan images from 12/24/2020, and also compared to her previous chest CT from 1/30/2019 in the abdomen pelvic CT on 1/2/2019.  I measured the spleen size from those images, and to find patient already had similar size of spleen at that time.  So this patient has stable mild splenomegaly.    Explained to patient, she has no symptoms, and there is no evidence for other lymphadenopathy.  I do not think this patient needs further work-up at this point.    Discussed with the patient, I will release her from our clinic.  We will be happy to see her if she has symptoms such as unexplained low low fever, weight loss, or lymphadenopathy, or evidence of bone marrow suppression with anemia, thrombocytopenia or leukocytopenia which is not uncommon for patient on Imuran.    Patient voiced understanding.      SHARON CHAVEZ M.D., Ph.D.      CC:   NURY Moreno

## 2021-02-08 PROBLEM — R16.1 SPLENOMEGALY: Status: ACTIVE | Noted: 2021-02-08

## 2021-03-26 ENCOUNTER — OFFICE VISIT (OUTPATIENT)
Dept: OBSTETRICS AND GYNECOLOGY | Facility: CLINIC | Age: 31
End: 2021-03-26

## 2021-03-26 VITALS
DIASTOLIC BLOOD PRESSURE: 80 MMHG | SYSTOLIC BLOOD PRESSURE: 120 MMHG | WEIGHT: 124.8 LBS | HEIGHT: 61 IN | BODY MASS INDEX: 23.56 KG/M2

## 2021-03-26 DIAGNOSIS — Z01.419 WELL WOMAN EXAM: ICD-10-CM

## 2021-03-26 DIAGNOSIS — Z78.9: ICD-10-CM

## 2021-03-26 DIAGNOSIS — N89.8 VAGINAL DISCHARGE: Primary | ICD-10-CM

## 2021-03-26 DIAGNOSIS — Z01.419 PAP SMEAR, LOW-RISK: ICD-10-CM

## 2021-03-26 DIAGNOSIS — N92.0 MENORRHAGIA WITH REGULAR CYCLE: ICD-10-CM

## 2021-03-26 DIAGNOSIS — D89.89 ANTISYNTHETASE SYNDROME (HCC): ICD-10-CM

## 2021-03-26 DIAGNOSIS — Z01.419 ROUTINE GYNECOLOGICAL EXAMINATION: ICD-10-CM

## 2021-03-26 LAB
B-HCG UR QL: NEGATIVE
BILIRUB BLD-MCNC: NEGATIVE MG/DL
CLARITY, POC: CLEAR
COLOR UR: YELLOW
GLUCOSE UR STRIP-MCNC: NEGATIVE MG/DL
INTERNAL NEGATIVE CONTROL: NEGATIVE
INTERNAL POSITIVE CONTROL: POSITIVE
KETONES UR QL: NEGATIVE
LEUKOCYTE EST, POC: NEGATIVE
Lab: 55
NITRITE UR-MCNC: NEGATIVE MG/ML
PH UR: 6 [PH] (ref 5–8)
PROT UR STRIP-MCNC: NEGATIVE MG/DL
RBC # UR STRIP: NEGATIVE /UL
SP GR UR: 1.02 (ref 1–1.03)
UROBILINOGEN UR QL: NORMAL

## 2021-03-26 PROCEDURE — 99385 PREV VISIT NEW AGE 18-39: CPT | Performed by: OBSTETRICS & GYNECOLOGY

## 2021-03-26 PROCEDURE — 81025 URINE PREGNANCY TEST: CPT | Performed by: OBSTETRICS & GYNECOLOGY

## 2021-03-26 RX ORDER — TRAMADOL HYDROCHLORIDE 50 MG/1
50 TABLET ORAL 2 TIMES DAILY PRN
COMMUNITY
Start: 2021-02-05 | End: 2022-08-24

## 2021-03-26 RX ORDER — GABAPENTIN 100 MG/1
300 CAPSULE ORAL NIGHTLY
COMMUNITY
Start: 2021-03-22 | End: 2021-12-17

## 2021-03-26 NOTE — PROGRESS NOTES
GYN Annual Exam     CC- Here for annual exam.     Pt new to practice? Yes  Pt new to me? Yes     Rachel Srinivasan is a 30 y.o.  female who presents for annual well woman exam. Patient's last menstrual period was 2021 (exact date).    Problems in addition to need for annual: pt has these problems that are new to me:  1. Recurrent discharge.   2. Progressive menorrhagia with regular cycle.  3. Myositis  4. antisynthetase syndrome.    HPI: Pt has started having heavy bleeding first few days of her periods which are regular.  Bleeding is so heavy that she has to stay home for 3 days.      Menorrhagia  This is a recurrent problem. The current episode started more than 1 month ago. The problem occurs every several days. The problem has been gradually worsening. Associated symptoms include fatigue. Pertinent negatives include no abdominal pain or urinary symptoms. The symptoms are aggravated by exertion.       PMHX:  Patient Active Problem List   Diagnosis   • 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CMS/HCC)   • Bronchitis   • Depressive disorder   • Myositis   • Poor venous access   • Port-A-Cath in place   • Splenomegaly   • Vaginal discharge   • Rhythm method of contraception   • Menorrhagia with regular cycle   ; otherwise none    OB History        3    Para   3    Term   3       0    AB   0    Living   3       SAB   0    TAB        Ectopic        Molar        Multiple        Live Births                      Past Medical History:   Diagnosis Date   • 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CMS/HCC)    • Arthritis    • Chronic sore throat     advised by PCP to have sleep study   • GERD (gastroesophageal reflux disease)     d/t steroids   • Kidney stones     history   • Migraine    • Myositis     monthly IVIG   • Shingles        Past Surgical History:   Procedure Laterality Date   • ADENOIDECTOMY     • EAR TUBES     • EYE SURGERY Right     eyelid reconstruction   • MUSCLE BIOPSY  2014    thigh right   • SKIN BIOPSY      right  thigh   • VENOUS ACCESS DEVICE (PORT) INSERTION N/A 2018    Procedure: INSERTION VENOUS ACCESS DEVICE;  Surgeon: Bo Thomas MD;  Location:  [facility] OR;  Service: General   • VENOUS ACCESS DEVICE (PORT) REMOVAL Right 2020    Procedure: REMOVAL VENOUS ACCESS DEVICE;  Surgeon: Bo Thomas MD;  Location:  [facility] OR;  Service: General;  Laterality: Right;  REMOVAL VENOUS ACCESS DEVICE         Current Outpatient Medications:   •  butalbital-acetaminophen-caffeine (FIORICET, ESGIC) -40 MG per tablet, butalbital-acetaminophen-caffeine 50 mg-325 mg-40 mg tablet  TAKE 1 TABLET BY MOUTH EVERY 6 HOURS AS NEEDED, Disp: , Rfl:   •  Calcium Carb-Cholecalciferol (CALCIUM 600 + D) 600-200 MG-UNIT tablet, Take 1 tablet by mouth 2 (Two) Times a Day., Disp: , Rfl:   •  ferrous sulfate 325 (65 FE) MG tablet, ferrous sulfate 325 mg (65 mg iron) tablet  Take 1 tablet every day by oral route for 90 days., Disp: , Rfl:   •  gabapentin (NEURONTIN) 100 MG capsule, , Disp: , Rfl:   •  mycophenolate (CELLCEPT) 500 MG tablet, Take 1,500 mg by mouth Every 12 (Twelve) Hours., Disp: , Rfl:   •  riTUXimab (RITUXAN IV), Infuse 1 dose into a venous catheter Every 6 (Six) Months., Disp: , Rfl:   •  traMADol (ULTRAM) 50 MG tablet, TAKE 1 TABLET TWICE A DAY BY ORAL ROUTE AS NEEDED., Disp: , Rfl:   •  azaTHIOprine (IMURAN) 50 MG tablet, Take 150 mg by mouth Every Morning., Disp: , Rfl:     Allergies   Allergen Reactions   • Sumatriptan Nausea Only     Chest pain       Social History     Tobacco Use   • Smoking status: Former Smoker     Packs/day: 0.50     Years: 15.00     Pack years: 7.50     Types: Cigarettes     Quit date: 3/13/2014     Years since quittin.0   • Smokeless tobacco: Never Used   Substance Use Topics   • Alcohol use: No   • Drug use: Never       Rachel Srinivasan  reports that she quit smoking about 7 years ago. Her smoking use included cigarettes. She has a 7.50 pack-year smoking history. She has never used smokeless  "tobacco.. I have educated her on the risk of diseases from using tobacco products           Family History   Problem Relation Age of Onset   • Hyperlipidemia Mother    • Hypertension Mother    • Lung cancer Father    • Cancer Father    • Throat cancer Father    • Cervical cancer Brother        Review of Systems   Constitutional: Positive for fatigue.   HENT: Negative.    Eyes: Negative.    Respiratory: Negative.    Cardiovascular: Negative.    Gastrointestinal: Negative.  Negative for abdominal pain.   Endocrine: Negative.    Genitourinary: Positive for menorrhagia, menstrual problem, vaginal bleeding and vaginal discharge.   Musculoskeletal: Negative.    Skin: Negative.    Allergic/Immunologic: Negative.    Neurological: Negative.    Hematological: Negative.    Psychiatric/Behavioral: Negative.        Patient reports that she is not currently experiencing any symptoms of urinary incontinence.      yesTESTED FOR CHLAMYDIA?    EXAM:  /80   Ht 156 cm (61.42\")   Wt 56.6 kg (124 lb 12.8 oz)   LMP 03/13/2021 (Exact Date)   Breastfeeding No   BMI 23.26 kg/m²     UA: clr    Physical Exam  Vitals and nursing note reviewed. Exam conducted with a chaperone present.   Constitutional:       General: She is not in acute distress.     Appearance: She is well-developed. She is not diaphoretic.   HENT:      Head: Normocephalic and atraumatic.      Nose: Nose normal.   Eyes:      Extraocular Movements: Extraocular movements intact.   Cardiovascular:      Rate and Rhythm: Normal rate.   Pulmonary:      Effort: Pulmonary effort is normal.   Chest:      Breasts: Breasts are symmetrical.         Right: Normal. No mass, nipple discharge, skin change or tenderness.         Left: Normal. No mass, nipple discharge, skin change or tenderness.   Abdominal:      General: There is no distension.      Palpations: Abdomen is soft. There is no mass.      Tenderness: There is no abdominal tenderness. There is no guarding. "   Genitourinary:     General: Normal vulva.      Pubic Area: No rash.       Vagina: Normal. No vaginal discharge.      Cervix: Normal.      Uterus: Normal.       Adnexa: Right adnexa normal and left adnexa normal.      Comments: cx wnl, pap done.  Nu swab done  Musculoskeletal:         General: No tenderness or deformity. Normal range of motion.      Cervical back: Normal range of motion.   Lymphadenopathy:      Upper Body:      Right upper body: No axillary adenopathy.      Left upper body: No axillary adenopathy.   Skin:     General: Skin is warm and dry.      Coloration: Skin is not pale.      Findings: No erythema or rash.   Neurological:      Mental Status: She is alert and oriented to person, place, and time.   Psychiatric:         Behavior: Behavior normal.         Thought Content: Thought content normal.         Judgment: Judgment normal.            As part of wellness and prevention, the following topics were discussed with the patient:  []  Nutrition  []  Physical activity/regular exercise   [x]  Healthy weight  []  Injury prevention  [x]  Substance misuse/abuse  []  Sexual behavior  []  STD prevention  []  Contaception  []  Dental health  [x]  Mental health  []  Immunization  [x]  Encouraged SBE     Counseling and guidance done:  Nutrition, physical activity, healthy weight, injury prevention, misuse of tobacco, alcohol and drugs, sexual behavior and STDs, contraception, dental health, mental health, immunizations breast cancer screening and exams.    Assessment     1) GYN annual well woman exam.   2) PAP done today? Yes  3) problems addressed: as above        Fhx breast cancer? No    Fhx ovarian cancer? No    Fhx colon cancer? No    Invitae testing offered? Yes           Plan       Follow up prn or one year.    Diagnoses and all orders for this visit:    1. Vaginal discharge (Primary)  -     NuSwab VG+ - Swab, Vagina  -     Genital Mycoplasmas SANTANA, Swab - Swab, Vagina    2. Routine gynecological  examination  -     POC Urinalysis Dipstick  -     POC Pregnancy, Urine  -     IgP, Aptima HPV    3. Well woman exam    4. 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CMS/HCC)    5. Rhythm method of contraception    6. Pap smear, low-risk  -     IgP, Aptima HPV    7. Menorrhagia with regular cycle        RTO Return in about 2 weeks (around 4/9/2021) for Recheck.    TIME: More than 50% of time spent in counseling and/or coordination of care.Time spent in counseling 35 min.  Counseling included the following topics as above with prognosis, differential diagnosis, risks, benefits of treatment, instructions, compliance and/or risk reduction and alternatives.       Pablito Vo MD  [unfilled]  09:59 EDT

## 2021-03-30 ENCOUNTER — TRANSCRIBE ORDERS (OUTPATIENT)
Dept: ADMINISTRATIVE | Facility: HOSPITAL | Age: 31
End: 2021-03-30

## 2021-03-30 DIAGNOSIS — R05.9 COUGH: Primary | ICD-10-CM

## 2021-03-30 DIAGNOSIS — Z01.818 OTHER SPECIFIED PRE-OPERATIVE EXAMINATION: Primary | ICD-10-CM

## 2021-03-30 LAB
CYTOLOGIST CVX/VAG CYTO: NORMAL
CYTOLOGY CVX/VAG DOC CYTO: NORMAL
CYTOLOGY CVX/VAG DOC THIN PREP: NORMAL
DX ICD CODE: NORMAL
HIV 1 & 2 AB SER-IMP: NORMAL
HPV I/H RISK 4 DNA CVX QL PROBE+SIG AMP: NEGATIVE
OTHER STN SPEC: NORMAL
STAT OF ADQ CVX/VAG CYTO-IMP: NORMAL

## 2021-03-31 LAB
A VAGINAE DNA VAG QL NAA+PROBE: NORMAL SCORE
BVAB2 DNA VAG QL NAA+PROBE: NORMAL SCORE
C ALBICANS DNA VAG QL NAA+PROBE: NEGATIVE
C GLABRATA DNA VAG QL NAA+PROBE: NEGATIVE
C TRACH DNA VAG QL NAA+PROBE: NEGATIVE
M GENITALIUM DNA SPEC QL NAA+PROBE: NEGATIVE
M HOMINIS DNA SPEC QL NAA+PROBE: NEGATIVE
MEGA1 DNA VAG QL NAA+PROBE: NORMAL SCORE
N GONORRHOEA DNA VAG QL NAA+PROBE: NEGATIVE
T VAGINALIS DNA VAG QL NAA+PROBE: NEGATIVE
UREAPLASMA DNA SPEC QL NAA+PROBE: NEGATIVE

## 2021-04-06 ENCOUNTER — LAB (OUTPATIENT)
Dept: LAB | Facility: HOSPITAL | Age: 31
End: 2021-04-06

## 2021-04-06 DIAGNOSIS — Z01.818 OTHER SPECIFIED PRE-OPERATIVE EXAMINATION: ICD-10-CM

## 2021-04-06 LAB — SARS-COV-2 RNA PNL SPEC NAA+PROBE: NOT DETECTED

## 2021-04-06 PROCEDURE — 87635 SARS-COV-2 COVID-19 AMP PRB: CPT | Performed by: OBSTETRICS & GYNECOLOGY

## 2021-04-06 PROCEDURE — C9803 HOPD COVID-19 SPEC COLLECT: HCPCS | Performed by: OBSTETRICS & GYNECOLOGY

## 2021-04-08 ENCOUNTER — TRANSCRIBE ORDERS (OUTPATIENT)
Dept: ADMINISTRATIVE | Facility: HOSPITAL | Age: 31
End: 2021-04-08

## 2021-04-08 ENCOUNTER — HOSPITAL ENCOUNTER (OUTPATIENT)
Dept: PULMONOLOGY | Facility: HOSPITAL | Age: 31
Discharge: HOME | End: 2021-04-08
Admitting: NURSE PRACTITIONER

## 2021-04-08 DIAGNOSIS — R05.9 COUGH: ICD-10-CM

## 2021-04-08 DIAGNOSIS — J40 BRONCHITIS: Primary | ICD-10-CM

## 2021-04-08 DIAGNOSIS — Z01.818 OTHER SPECIFIED PRE-OPERATIVE EXAMINATION: Primary | ICD-10-CM

## 2021-04-08 LAB
BDY SITE: NORMAL
HGB BLDA-MCNC: 13.9 G/DL (ref 13.5–17.5)
SAO2 % BLDCOA: 95.7 % (ref 94–99)

## 2021-04-08 PROCEDURE — 94010 BREATHING CAPACITY TEST: CPT

## 2021-04-08 PROCEDURE — 82820 HEMOGLOBIN-OXYGEN AFFINITY: CPT

## 2021-04-08 PROCEDURE — 94729 DIFFUSING CAPACITY: CPT

## 2021-04-08 PROCEDURE — 94726 PLETHYSMOGRAPHY LUNG VOLUMES: CPT

## 2021-04-19 ENCOUNTER — APPOINTMENT (OUTPATIENT)
Dept: LAB | Facility: HOSPITAL | Age: 31
End: 2021-04-19

## 2021-04-23 ENCOUNTER — PROCEDURE VISIT (OUTPATIENT)
Dept: OBSTETRICS AND GYNECOLOGY | Facility: CLINIC | Age: 31
End: 2021-04-23

## 2021-04-23 VITALS
DIASTOLIC BLOOD PRESSURE: 74 MMHG | BODY MASS INDEX: 23.03 KG/M2 | SYSTOLIC BLOOD PRESSURE: 122 MMHG | HEIGHT: 61 IN | WEIGHT: 122 LBS

## 2021-04-23 DIAGNOSIS — N92.1 MENORRHAGIA WITH IRREGULAR CYCLE: Primary | ICD-10-CM

## 2021-04-23 PROCEDURE — 58340 CATHETER FOR HYSTEROGRAPHY: CPT | Performed by: OBSTETRICS & GYNECOLOGY

## 2021-04-23 PROCEDURE — 58100 BIOPSY OF UTERUS LINING: CPT | Performed by: OBSTETRICS & GYNECOLOGY

## 2021-04-23 RX ORDER — METHYLPREDNISOLONE 4 MG/1
TABLET ORAL
COMMUNITY
Start: 2021-04-06 | End: 2021-06-01

## 2021-04-23 RX ORDER — PSEUDOEPHEDRINE HCL 30 MG
TABLET ORAL
COMMUNITY
Start: 2021-03-29 | End: 2021-06-01

## 2021-04-23 RX ORDER — FLUTICASONE PROPIONATE 50 MCG
2 SPRAY, SUSPENSION (ML) NASAL DAILY PRN
COMMUNITY
Start: 2021-03-30 | End: 2021-12-17

## 2021-04-23 NOTE — PROGRESS NOTES
PROCEDURE NOTE FOR SONOHYSTOGRAM OR SALINE INFUSION SONOGRAM (SIS);   PROCEDURE AND INTERPRETATION.        INDICATION/CC/PREOP DX: menorrhagia with regular cycle    POSTOP DX: same    Informed consent obtained.    Pt present in ultrasound room, scount ultrasound was already done by technician.    ANESTHESIA USED: none    In dorsolithotomy position, open graves speculum placed in the vagina.  Pap smear done? no    The  cervix was painted with betadine.    Single tooth tenaculum placed on anterior cervical lip and uterus sounded to 8cms.    Endometrial biopsy suction pipette used to obtain endometrial biopsy and this was sent for permanent section.      Flexible sonohyst catheter placed in endomtrial canal and balloon inflated and 10ccs normal saline infused into endometrial cavity.  Outline of cavity seen clearly and documented.    All instruments were removed.    Pt tolerated procedure well and left in satisfactory condition.    Instructions and precautions given.      IMPRESSION/INTERPRETATION:  Post wall polyp.      PLAN:  Call for results.  Candidate for at least a dx hyst, D&C w/ or w/o an ablation.     Pablito Vo MD  08:58 EDT  04/23/21

## 2021-04-27 LAB
DX ICD CODE: NORMAL
PATH REPORT.FINAL DX SPEC: NORMAL
PATH REPORT.GROSS SPEC: NORMAL
PATH REPORT.RELEVANT HX SPEC: NORMAL
PATH REPORT.SITE OF ORIGIN SPEC: NORMAL
PATHOLOGIST NAME: NORMAL
PAYMENT PROCEDURE: NORMAL

## 2021-04-29 ENCOUNTER — TRANSCRIBE ORDERS (OUTPATIENT)
Dept: ADMINISTRATIVE | Facility: HOSPITAL | Age: 31
End: 2021-04-29

## 2021-04-29 DIAGNOSIS — M54.50 LOW BACK PAIN, UNSPECIFIED BACK PAIN LATERALITY, UNSPECIFIED CHRONICITY, UNSPECIFIED WHETHER SCIATICA PRESENT: Primary | ICD-10-CM

## 2021-05-17 ENCOUNTER — HOSPITAL ENCOUNTER (OUTPATIENT)
Dept: MRI IMAGING | Facility: HOSPITAL | Age: 31
Discharge: HOME | End: 2021-05-17

## 2021-05-25 ENCOUNTER — OFFICE VISIT (OUTPATIENT)
Dept: OBSTETRICS AND GYNECOLOGY | Facility: CLINIC | Age: 31
End: 2021-05-25

## 2021-05-25 VITALS
WEIGHT: 120.2 LBS | DIASTOLIC BLOOD PRESSURE: 70 MMHG | HEIGHT: 61 IN | BODY MASS INDEX: 22.69 KG/M2 | SYSTOLIC BLOOD PRESSURE: 110 MMHG

## 2021-05-25 DIAGNOSIS — N94.6 DYSMENORRHEA: ICD-10-CM

## 2021-05-25 DIAGNOSIS — M60.9 MYOSITIS, UNSPECIFIED MYOSITIS TYPE, UNSPECIFIED SITE: ICD-10-CM

## 2021-05-25 DIAGNOSIS — Z30.2 REQUEST FOR STERILIZATION: ICD-10-CM

## 2021-05-25 DIAGNOSIS — N92.0 MENORRHAGIA WITH REGULAR CYCLE: Primary | ICD-10-CM

## 2021-05-25 DIAGNOSIS — N84.0 ENDOMETRIAL POLYP: ICD-10-CM

## 2021-05-25 DIAGNOSIS — Z78.9: ICD-10-CM

## 2021-05-25 DIAGNOSIS — Z13.9 SCREENING FOR CONDITION: ICD-10-CM

## 2021-05-25 DIAGNOSIS — D89.89 ANTISYNTHETASE SYNDROME (HCC): ICD-10-CM

## 2021-05-25 DIAGNOSIS — I87.8 POOR VENOUS ACCESS: ICD-10-CM

## 2021-05-25 PROBLEM — N89.8 VAGINAL DISCHARGE: Status: RESOLVED | Noted: 2021-03-26 | Resolved: 2021-05-25

## 2021-05-25 PROCEDURE — 99214 OFFICE O/P EST MOD 30 MIN: CPT | Performed by: OBSTETRICS & GYNECOLOGY

## 2021-05-25 PROCEDURE — 81025 URINE PREGNANCY TEST: CPT | Performed by: OBSTETRICS & GYNECOLOGY

## 2021-05-25 RX ORDER — SODIUM CHLORIDE 0.9 % (FLUSH) 0.9 %
1-10 SYRINGE (ML) INJECTION AS NEEDED
Status: CANCELLED | OUTPATIENT
Start: 2021-05-25

## 2021-05-25 RX ORDER — SODIUM CHLORIDE 0.9 % (FLUSH) 0.9 %
3 SYRINGE (ML) INJECTION EVERY 12 HOURS SCHEDULED
Status: CANCELLED | OUTPATIENT
Start: 2021-05-25

## 2021-05-25 RX ORDER — SODIUM CHLORIDE 9 MG/ML
40 INJECTION, SOLUTION INTRAVENOUS AS NEEDED
Status: CANCELLED | OUTPATIENT
Start: 2021-05-25

## 2021-05-25 NOTE — PROGRESS NOTES
"EVALUATION AND MANAGEMENT ENCOUNTER    Rachel Srinivasan  Patient new to examiner? No  New problem to examiner? No  Patient referred? No    -----------------------------------------------------HISTORY---------------------------------------------------    Chief Complaint:   Chief Complaint   Patient presents with   • Follow-up     Discuss Ablation       HPI:  Rachel Srinivasan is a 30 y.o.  with Patient's last menstrual period was 2021 (approximate). here to discuss scheduling surgery.  Pt had sonohystogram with neg embx, but sonograhic evidence of a polyp.  Pt still having abnormal bleeding.  Pt has antisynthetase syndrome. Pt wants her tubes tied as well so she won't have a chance of getting pregnant again.     History of Present Illness     Rachel Srinivasan  reports that she quit smoking about 7 years ago. Her smoking use included cigarettes. She has a 7.50 pack-year smoking history. She has never used smokeless tobacco..            ROS:  Review of Systems   Constitutional: Negative.    HENT: Negative.    Eyes: Negative.    Respiratory: Negative.    Cardiovascular: Negative.    Gastrointestinal: Negative.    Endocrine: Negative.    Genitourinary: Positive for menstrual problem and pelvic pain.   Musculoskeletal: Negative.    Skin: Negative.    Allergic/Immunologic: Negative.    Neurological: Negative.    Hematological: Negative.    Psychiatric/Behavioral: Negative.    :    Patient reports that she is not currently experiencing any symptoms of urinary incontinence.      noTESTED FOR CHLAMYDIA?  -----------------------------------------------PHYSICAL EXAM----------------------------------------------    Vital Signs: /70   Ht 154.9 cm (60.98\")   Wt 54.5 kg (120 lb 3.2 oz)   LMP 2021 (Approximate)   Breastfeeding No   BMI 22.72 kg/m²    Flowsheet Rows      First Filed Value   Admission Height  154.9 cm (60.98\") Documented at 2021 1606   Admission Weight  54.5 kg (120 lb 3.2 oz) Documented " at 05/25/2021 1606          Physical Exam  Vitals and nursing note reviewed.   Constitutional:       Appearance: She is well-developed.   HENT:      Head: Normocephalic and atraumatic.   Cardiovascular:      Rate and Rhythm: Normal rate.   Pulmonary:      Effort: Pulmonary effort is normal.   Abdominal:      General: There is no distension.      Palpations: Abdomen is soft. There is no mass.      Tenderness: There is no abdominal tenderness. There is no guarding.   Genitourinary:     Vagina: No vaginal discharge.   Musculoskeletal:         General: No tenderness or deformity. Normal range of motion.      Cervical back: Normal range of motion.   Skin:     General: Skin is warm and dry.      Coloration: Skin is not pale.      Findings: No erythema or rash.   Neurological:      Mental Status: She is alert and oriented to person, place, and time.   Psychiatric:         Behavior: Behavior normal.         Thought Content: Thought content normal.         Judgment: Judgment normal.         -----------------------------------------------MEDICAL DECISION MAKING-----------------------------        DATA Review & labs ordered:     1.   Lab Results (last 24 hours)     ** No results found for the last 24 hours. **        2.   Imaging Results (Last 24 Hours)     ** No results found for the last 24 hours. **        3.   ECG/EMG Results (most recent)     None        4. Old records reviewed? Yes  5. Old records ordered?  No  6. Labs ordered?: Yes:  urinalysis: clr  7. Imaging other than ultrasound ordered?: No        Reviewed with other physician? no     8. Ultrasound ordered and reviewed? No  9. Diagnoses and/or chronic conditions reviewed:      Diagnoses and all orders for this visit:    1. Menorrhagia with regular cycle (Primary)  -     Case Request; Standing  -     CBC and Differential; Future  -     Pregnancy, Urine - Urine, Clean Catch; Future  -     sodium chloride 0.9 % flush 1-10 mL  -     sodium chloride 0.9 % flush 3 mL  -      sodium chloride 0.9 % infusion 40 mL  -     ceFAZolin (ANCEF) 2 g in sodium chloride 0.9 % 100 mL IVPB  -     Case Request    2. Screening for condition  -     POC Urinalysis Dipstick  -     POC Pregnancy, Urine    3. Endometrial polyp  -     Case Request; Standing  -     CBC and Differential; Future  -     Pregnancy, Urine - Urine, Clean Catch; Future  -     sodium chloride 0.9 % flush 1-10 mL  -     sodium chloride 0.9 % flush 3 mL  -     sodium chloride 0.9 % infusion 40 mL  -     ceFAZolin (ANCEF) 2 g in sodium chloride 0.9 % 100 mL IVPB  -     Case Request    4. Request for sterilization  -     Case Request; Standing  -     CBC and Differential; Future  -     Pregnancy, Urine - Urine, Clean Catch; Future  -     sodium chloride 0.9 % flush 1-10 mL  -     sodium chloride 0.9 % flush 3 mL  -     sodium chloride 0.9 % infusion 40 mL  -     ceFAZolin (ANCEF) 2 g in sodium chloride 0.9 % 100 mL IVPB  -     Case Request    5. Myositis, unspecified myositis type, unspecified site    6. 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CMS/HCC)    7. Poor venous access    8. Rhythm method of contraception    9. Dysmenorrhea    Other orders  -     Follow Anesthesia Guidelines / Standing Orders; Future  -     Chlorhexidine Skin Prep; Future  -     Follow Anesthesia Guidelines / Standing Orders; Standing  -     Obtain informed consent; Standing  -     Place sequential compression device; Standing  -     Verify / Perform Chlorhexidine Skin Prep; Standing  -     hCG, Serum, Qualitative; Standing  -     Insert Peripheral IV; Standing  -     Saline Lock & Maintain IV Access; Standing        IMPRESSION/PROBLEM:      MENOMETRORRHAGIA WITH DYSMENORRHEA  ENDOMETRIAL POLYP  REQUEST FOR STERILIZATION  MYOSITIS W/ ANTISYNTHETASE SYNDROME    (Established problem/s? Yes, worsening? No)    (New Problem/s? No, additional workup planned? Yes)      PLAN:     1. LAPAROSCOPIC BILATERAL SALPINGECTOMY, DIAGNOSTIC HYSTEROSCOPY, DILATATION AND CURETTAGE, MYOSURE  ENDOMETRIAL POLYPECTOMY, NOVASURE ABLATION W/ ANTIBIOSIS     2. RISKS, ALTERNATIVES, COMPLICATIONS OF THE PROCEDURE INCLUDING BUT NOT LIMITED TO:    INTRAOPERATIVE RISKS: INJURY TO INTERNAL AND ADJACENT ORGANS AND STRUCTURES (BOWEL, BLADDER, URETER,BLOOD VESSELS) OR HEMORRHAGE REQUIRING FURTHER SURGERY (LAPAROTOMY),  POSSIBLE NON-DIAGNOSTIC FINDINGS, DISCOVERY OF POSSIBLE MALIGNANCY, INFECTION, AND DEATH;   POSTOP COMPLICATIONS: BLEEDING, INFECTION (REQUIRING POSSIBLE REOPERATION), FAILURE OF GOAL OF SURGERY AND RECURRENCE OF ORIGINAL SYMPTOMS, PNEUMONIA, PULMONARY EMBOLISM, AND DEATH;  WERE EXPLAINED TO THE PT WHO VERBALIZED HER UNDERSTANDING.            Pt to call for any results from testing promptly if she does not hear from us.     RTO Return in about 4 weeks (around 6/22/2021) for postop check..  Instructions and precautions given.         Pablito Vo MD  21:52 EDT  05/25/21

## 2021-05-26 LAB
B-HCG UR QL: NEGATIVE
BILIRUB BLD-MCNC: NEGATIVE MG/DL
CLARITY, POC: CLEAR
COLOR UR: YELLOW
GLUCOSE UR STRIP-MCNC: NEGATIVE MG/DL
INTERNAL NEGATIVE CONTROL: NEGATIVE
INTERNAL POSITIVE CONTROL: POSITIVE
KETONES UR QL: NEGATIVE
LEUKOCYTE EST, POC: NEGATIVE
Lab: 55
NITRITE UR-MCNC: NEGATIVE MG/ML
PH UR: 5 [PH] (ref 5–8)
PROT UR STRIP-MCNC: NEGATIVE MG/DL
RBC # UR STRIP: NEGATIVE /UL
SP GR UR: 1.02 (ref 1–1.03)
UROBILINOGEN UR QL: NORMAL

## 2021-06-01 ENCOUNTER — TELEPHONE (OUTPATIENT)
Dept: OBSTETRICS AND GYNECOLOGY | Facility: CLINIC | Age: 31
End: 2021-06-01

## 2021-06-01 ENCOUNTER — PRE-ADMISSION TESTING (OUTPATIENT)
Dept: PREADMISSION TESTING | Facility: HOSPITAL | Age: 31
End: 2021-06-01

## 2021-06-01 VITALS
HEIGHT: 62 IN | SYSTOLIC BLOOD PRESSURE: 122 MMHG | WEIGHT: 121.2 LBS | OXYGEN SATURATION: 100 % | RESPIRATION RATE: 16 BRPM | HEART RATE: 90 BPM | DIASTOLIC BLOOD PRESSURE: 80 MMHG | BODY MASS INDEX: 22.31 KG/M2

## 2021-06-01 LAB
ANION GAP SERPL CALCULATED.3IONS-SCNC: 10 MMOL/L (ref 5–15)
BUN SERPL-MCNC: 7 MG/DL (ref 6–20)
BUN/CREAT SERPL: 14.3 (ref 7–25)
CALCIUM SPEC-SCNC: 8.8 MG/DL (ref 8.6–10.5)
CHLORIDE SERPL-SCNC: 106 MMOL/L (ref 98–107)
CO2 SERPL-SCNC: 24 MMOL/L (ref 22–29)
CREAT SERPL-MCNC: 0.49 MG/DL (ref 0.57–1)
GFR SERPL CREATININE-BSD FRML MDRD: 146 ML/MIN/1.73
GLUCOSE SERPL-MCNC: 88 MG/DL (ref 65–99)
POTASSIUM SERPL-SCNC: 3.7 MMOL/L (ref 3.5–5.2)
SODIUM SERPL-SCNC: 140 MMOL/L (ref 136–145)

## 2021-06-01 PROCEDURE — 85025 COMPLETE CBC W/AUTO DIFF WBC: CPT | Performed by: OBSTETRICS & GYNECOLOGY

## 2021-06-01 PROCEDURE — 80048 BASIC METABOLIC PNL TOTAL CA: CPT | Performed by: OBSTETRICS & GYNECOLOGY

## 2021-06-01 NOTE — PAT
Pt here for PAT visit.  Pre-op tests completed, shower w/antibacterial soap, and pre-op instructions reviewed.  Instructed clears until 7:15 am dos, voiced understanding. Pt stated had notified MD office that she is only having D&C at this time. Notified scheduled would need new case/consent orders, stated surgeon would be notified. COVID test 6/8

## 2021-06-01 NOTE — DISCHARGE INSTRUCTIONS
PRE-ADMISSION TESTING INSTRUCTIONS FOR ADULTS    Take these medications the morning of surgery with a small sip of water: cellcept      No aspirin, advil, aleve, ibuprofen, naproxen, diet pills, decongestants, or herbal/vitamins for a week prior to surgery.    General Instructions:    • Do not eat solid food after midnight the night before surgery.  No gum, mints, or hard candy after midnight the night before surgery.  • You may drink clear liquids the day of surgery up until 2 hours before your arrival time.  (7:15 am)  • Clear liquids are liquids you can see through. Nothing RED in color.    Plain water    Sports drinks  Sodas     Gelatin (Jell-O)  Fruit juices without pulp such as white grape juice and apple juice  Popsicles that contain no fruit or yogurt  Tea or coffee (no cream or milk added)    • It is beneficial for you to have a clear drink that contains carbohydrates just before you leave your house and before your fasting time begins.  We suggest a 20 ounce bottle of Gatorade or Powerade for non-diabetic patients or a 20 ounce bottle of G2 or Powerade Zero for diabetic patients.  (7:15 am)    • Patients who avoid smoking, chewing tobacco and alcohol for 4 weeks prior to surgery have a reduced risk of post-operative complications.  If at all possible, quit smoking as many days before surgery as you can.    • Do not smoke, use chewing tobacco or drink alcohol the day of surgery    • Bring your C-PAP/ BI-PAP machine if you use one.  • Wear clean comfortable clothes and socks.  • Do not wear contact lenses, lotion, deodorant, or make-up.  Bring a case for your glasses if applicable. You may brush your teeth the morning of surgery.  • You may wear dentures/partials, do not put adhesive/glue on them.  • Bring crutches or walker if applicable.  • Leave all other jewelry and valuables at home.      Preventing a Surgical Site Infection:    • Shower the night before and on the morning of surgery using the  chlorhexidine soap you were given.  Use a clean washcloth with the soap.  Place clean sheets on your bed after showering the night before surgery. Do not use the CHG soap on your hair, face, or private areas. Wash your body gently for five (5) minutes. Do not scrub your skin.  Dry with a clean towel and dress in clean clothing.    • Do not shave the surgical area for 10 days-2 weeks prior to surgery  because the razor can irritate skin and make it easier to develop an infection.  • Make sure you, your family, and all healthcare providers clean their hands with soap and water or an alcohol based hand  before caring for you or your wound.      Day of surgery:    Your surgeon’s office will advise you of your arrival time for the day of surgery.    Upon arrival, a Pre-op nurse and Anesthesia provider will review your health history, obtain vital signs, and answer questions you may have.  The only belongings needed at this time will be your home medications and if applicable your C-PAP/BI-PAP machine.  If you are staying overnight your family can leave the rest of your belongings in the car and bring them to your room later.  A Pre-op nurse will start an IV and you may receive medication in preparation for surgery, including something to help you relax.  Your family will be able to see you in the Pre-op area.  While you are in surgery your family should notify the waiting room  if they leave the waiting room area and provide a contact phone number.    IF you have any questions, you can call the Pre-Admission Department at [phone] or your surgeon's office.  Notify your surgeon if  you become sick, have a fever, productive cough, or cannot be here the day of surgery    Please be aware that surgery does come with discomfort.  We want to make every effort to control your discomfort so please discuss any uncontrolled symptoms with your nurse.   Your doctor will most likely have prescribed pain  medications.      If you are going home after surgery, you will receive individualized written care instructions before being discharged.  A responsible adult (over the age of 18) must drive you to and from the hospital on the day of your surgery and stay with you for 24 hours after anesthesia.    If you are staying overnight following surgery, you will be transported to your hospital room following the recovery period.  [facility] has all private rooms.    You may receive a survey regarding the care you received. Your feedback is very important and will be used to collect the necessary data to help us to continue to provide excellent care.     Deductibles and co-payments are collected on the day of service. Please be prepared to pay the required co-pay, deductible or deposit on the day of service as defined by your plan.    Hysteroscopy  Hysteroscopy is a procedure used for looking inside the womb (uterus). It may be done for various reasons, including:  · To evaluate abnormal bleeding, fibroid (benign, noncancerous) tumors, polyps, scar tissue (adhesions), and possibly cancer of the uterus.  · To look for lumps (tumors) and other uterine growths.  · To look for causes of why a woman cannot get pregnant (infertility), causes of recurrent loss of pregnancy (miscarriages), or a lost intrauterine device (IUD).  · To perform a sterilization by blocking the fallopian tubes from inside the uterus.  In this procedure, a thin, flexible tube with a tiny light and camera on the end of it (hysteroscope) is used to look inside the uterus. A hysteroscopy should be done right after a menstrual period to be sure you are not pregnant.  LET YOUR HEALTH CARE PROVIDER KNOW ABOUT:   · Any allergies you have.  · All medicines you are taking, including vitamins, herbs, eye drops, creams, and over-the-counter medicines.  · Previous problems you or members of your family have had with the use of anesthetics.  · Any blood  disorders you have.  · Previous surgeries you have had.  · Medical conditions you have.  RISKS AND COMPLICATIONS   Generally, this is a safe procedure. However, as with any procedure, complications can occur. Possible complications include:  · Putting a hole in the uterus.  · Excessive bleeding.  · Infection.  · Damage to the cervix.  · Injury to other organs.  · Allergic reaction to medicines.  · Too much fluid used in the uterus for the procedure.  BEFORE THE PROCEDURE   · Ask your health care provider about changing or stopping any regular medicines.  · Do not take aspirin or blood thinners for 1 week before the procedure, or as directed by your health care provider. These can cause bleeding.  · If you smoke, do not smoke for 2 weeks before the procedure.  · In some cases, a medicine is placed in the cervix the day before the procedure. This medicine makes the cervix have a larger opening (dilate). This makes it easier for the instrument to be inserted into the uterus during the procedure.  · Do not eat or drink anything for at least 8 hours before the surgery.  · Arrange for someone to take you home after the procedure.  PROCEDURE   · You may be given a medicine to relax you (sedative). You may also be given one of the following:  ¨ A medicine that numbs the area around the cervix (local anesthetic).  ¨ A medicine that makes you sleep through the procedure (general anesthetic).  · The hysteroscope is inserted through the vagina into the uterus. The camera on the hysteroscope sends a picture to a TV screen. This gives the surgeon a good view inside the uterus.  · During the procedure, a liquid is put into the uterus, which allows the surgeon to see better.  · Sometimes, tissue is gently scraped from inside the uterus. These tissue samples are sent to a lab for testing.  AFTER THE PROCEDURE   · If you had a general anesthetic, you may be groggy for a couple hours after the procedure.  · If you had a local  anesthetic, you will be able to go home as soon as you are stable and feel ready.  · You may have some cramping. This normally lasts for a couple days.  · You may have bleeding, which varies from light spotting for a few days to menstrual-like bleeding for 3-7 days. This is normal.  · If your test results are not back during the visit, make an appointment with your health care provider to find out the results.     This information is not intended to replace advice given to you by your health care provider. Make sure you discuss any questions you have with your health care provider.     Document Released: 2002 Document Revised: 10/08/2014 Document Reviewed: 2014  [employer]® Patient Information © [employer].      Dilation and Curettage or Vacuum Curettage  Dilation and curettage (D&C) and vacuum curettage are minor procedures. A D&C involves stretching (dilation) the cervix and scraping (curettage) the inside lining of the womb (uterus). During a D&C, tissue is gently scraped from the inside lining of the uterus. During a vacuum curettage, the lining and tissue in the uterus are removed with the use of gentle suction.   Curettage may be performed to either diagnose or treat a problem. As a diagnostic procedure, curettage is performed to examine tissues from the uterus. A diagnostic curettage may be performed for the following symptoms:   · Irregular bleeding in the uterus.    · Bleeding with the development of clots.    · Spotting between menstrual periods.    · Prolonged menstrual periods.    · Bleeding after menopause.    · No menstrual period (amenorrhea).    · A change in size and shape of the uterus.    As a treatment procedure, curettage may be performed for the following reasons:   · Removal of an IUD (intrauterine device).    · Removal of retained placenta after giving birth. Retained placenta can cause an infection or bleeding severe enough to require transfusions.    · .     · Miscarriage.    · Removal of polyps inside the uterus.    · Removal of uncommon types of noncancerous lumps (fibroids).    LET YOUR HEALTH CARE PROVIDER KNOW ABOUT:   · Any allergies you have.    · All medicines you are taking, including vitamins, herbs, eye drops, creams, and over-the-counter medicines.    · Previous problems you or members of your family have had with the use of anesthetics.    · Any blood disorders you have.    · Previous surgeries you have had.    · Medical conditions you have.  RISKS AND COMPLICATIONS   Generally, this is a safe procedure. However, as with any procedure, complications can occur. Possible complications include:  · Excessive bleeding.    · Infection of the uterus.    · Damage to the cervix.    · Development of scar tissue (adhesions) inside the uterus, later causing abnormal amounts of menstrual bleeding.    · Complications from the general anesthetic, if a general anesthetic is used.    · Putting a hole (perforation) in the uterus. This is rare.    BEFORE THE PROCEDURE   · Eat and drink before the procedure only as directed by your health care provider.    · Arrange for someone to take you home.    PROCEDURE   This procedure usually takes about 15-30 minutes.  · You will be given one of the following:    A medicine that numbs the area in and around the cervix (local anesthetic).      A medicine to make you sleep through the procedure (general anesthetic).  · You will lie on your back with your legs in stirrups.    · A warm metal or plastic instrument (speculum) will be placed in your vagina to keep it open and to allow the health care provider to see the cervix.  · There are two ways in which your cervix can be softened and dilated. These include:      Taking a medicine.      Having thin rods (laminaria) inserted into your cervix.    · A curved tool (curette) will be used to scrape cells from the inside lining of the uterus. In some cases, gentle suction is applied with the  curette. The curette will then be removed.    AFTER THE PROCEDURE   · You will rest in the recovery area until you are stable and are ready to go home.    · You may feel sick to your stomach (nauseous) or throw up (vomit) if you were given a general anesthetic.    · You may have a sore throat if a tube was placed in your throat during general anesthesia.    · You may have light cramping and bleeding. This may last for 2 days to 2 weeks after the procedure.    · Your uterus needs to make a new lining after the procedure. This may make your next period late.     This information is not intended to replace advice given to you by your health care provider. Make sure you discuss any questions you have with your health care provider.     Document Released: 12/18/2006 Document Revised: 08/20/2014 Document Reviewed: 07/17/2014  [employer]® Patient Information ©2016 [employer], [employer].

## 2021-06-01 NOTE — TELEPHONE ENCOUNTER
Patient only wants the D&C, she no longer wants the BTL, myosure or novasure. Can you please change orders and order consents.

## 2021-06-07 ENCOUNTER — TELEPHONE (OUTPATIENT)
Dept: OBSTETRICS AND GYNECOLOGY | Facility: CLINIC | Age: 31
End: 2021-06-07

## 2021-06-07 NOTE — TELEPHONE ENCOUNTER
PATIENT IS SCHEDULED THIS WEEK FOR SURGERY. SHE STATES SHE ONLY WANTS THE D&C. CAN YOU PLEASE MODIFY SURGERY ORDERS AND CONSENTS.

## 2021-06-08 ENCOUNTER — LAB (OUTPATIENT)
Dept: LAB | Facility: HOSPITAL | Age: 31
End: 2021-06-08

## 2021-06-08 ENCOUNTER — TELEPHONE (OUTPATIENT)
Dept: OBSTETRICS AND GYNECOLOGY | Facility: CLINIC | Age: 31
End: 2021-06-08

## 2021-06-08 ENCOUNTER — APPOINTMENT (OUTPATIENT)
Dept: MRI IMAGING | Facility: HOSPITAL | Age: 31
End: 2021-06-08

## 2021-06-08 DIAGNOSIS — Z01.818 OTHER SPECIFIED PRE-OPERATIVE EXAMINATION: ICD-10-CM

## 2021-06-08 LAB — SARS-COV-2 RNA PNL SPEC NAA+PROBE: NOT DETECTED

## 2021-06-08 PROCEDURE — C9803 HOPD COVID-19 SPEC COLLECT: HCPCS

## 2021-06-08 PROCEDURE — 87635 SARS-COV-2 COVID-19 AMP PRB: CPT | Performed by: OBSTETRICS & GYNECOLOGY

## 2021-06-08 NOTE — TELEPHONE ENCOUNTER
I spoke with the patient this morning and she clarified she only wants the D&C for now. She does not want to proceed with the Ablation.

## 2021-06-08 NOTE — TELEPHONE ENCOUNTER
Signed           I  spoke with the patient this morning and she clarified she only wants the D&C for now. She does not want to proceed with the Ablation.

## 2021-06-09 ENCOUNTER — ANESTHESIA EVENT (OUTPATIENT)
Dept: PERIOP | Facility: HOSPITAL | Age: 31
End: 2021-06-09

## 2021-06-09 PROCEDURE — S0260 H&P FOR SURGERY: HCPCS | Performed by: OBSTETRICS & GYNECOLOGY

## 2021-06-09 NOTE — H&P
PREOPERATIVE HISTORY AND PHYSICAL      Patient Care Team:  Kerrie Velez APRN as PCP - General (Family Medicine)  Hang Jordan MD as PCP - Family Medicine  Bo Thomas MD as Surgeon (General Surgery)  Rose Khan MD PhD as Consulting Physician (Hematology and Oncology)  Kerrie Velez APRN as Referring Physician (Family Medicine)    Chief complaint: Menorrhagia with regular cycle    Pt is a 30 y.o.   No LMP recorded.     HPI:Pt here for dx hyst, D&C for endometrial polyp with menorrhagia.  Pt originally preferred a tubal ligation and myosure polypectomy with ablation but changed her mind.       PMHx:   Past Medical History:   Diagnosis Date   • 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CMS/HCC)     follows w/Dr at    • Arthritis    • Chronic sore throat     advised by PCP to have sleep study   • GERD (gastroesophageal reflux disease)     d/t steroids   • ILD (interstitial lung disease) (CMS/HCC)     had normal PFTs, to have CT   • Kidney stones     history   • Migraine    • Myositis    • Raynaud's syndrome    • Shingles        Current problem list:  Patient Active Problem List   Diagnosis   • 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CMS/HCC)   • Bronchitis   • Depressive disorder   • Myositis   • Poor venous access   • Port-A-Cath in place   • Splenomegaly   • Rhythm method of contraception   • Menorrhagia with regular cycle   • Request for sterilization   • Endometrial polyp   • Dysmenorrhea       PSHx:   Past Surgical History:   Procedure Laterality Date   • ADENOIDECTOMY     • EAR TUBES     • EYE SURGERY Right     eyelid reconstruction   • MUSCLE BIOPSY  2014    thigh right   • SKIN BIOPSY      right thigh   • VENOUS ACCESS DEVICE (PORT) INSERTION N/A 2018    Procedure: INSERTION VENOUS ACCESS DEVICE;  Surgeon: Bo Thomas MD;  Location: [facility] OR;  Service: General   • VENOUS ACCESS DEVICE (PORT) REMOVAL Right 2020    Procedure: REMOVAL VENOUS ACCESS DEVICE;  Surgeon: Bo Thomas MD;  Location: [facility]  OR;  Service: General;  Laterality: Right;  REMOVAL VENOUS ACCESS DEVICE       Social Hx:   Social History     Socioeconomic History   • Marital status:      Spouse name: [place]   • Number of children: Not on file   • Years of education: Not on file   • Highest education level: Not on file   Tobacco Use   • Smoking status: Former Smoker     Packs/day: 0.50     Years: 15.00     Pack years: 7.50     Types: Cigarettes     Quit date: 3/13/2014     Years since quittin.2   • Smokeless tobacco: Never Used   Vaping Use   • Vaping Use: Never used   Substance and Sexual Activity   • Alcohol use: No   • Drug use: Never   • Sexual activity: Defer       FHx:   Family History   Problem Relation Age of Onset   • Hyperlipidemia Mother    • Hypertension Mother    • Lung cancer Father    • Cancer Father    • Throat cancer Father    • Cervical cancer Brother    • Malig Hyperthermia Neg Hx        Debilities/Disabilities Identified: None    Emotional Behavior: Appropriate    PGyn Hx:  otherwise noncontributory    POBHx:   OB History    Para Term  AB Living   3 3 3 0 0 3   SAB TAB Ectopic Molar Multiple Live Births   0 0 0 0 0 0      # Outcome Date GA Lbr Christiano/2nd Weight Sex Delivery Anes PTL Lv   3 Term      Vag-Spont      2 Term      Vag-Spont      1 Term      Vag-Spont          Allergies: Sumatriptan    Medications:   No medications prior to admission.                            No current facility-administered medications for this encounter.    Current Outpatient Medications:   •  butalbital-acetaminophen-caffeine (FIORICET, ESGIC) -40 MG per tablet, Take 1 tablet by mouth Every 6 (Six) Hours As Needed for Headache., Disp: , Rfl:   •  Calcium Carbonate-Vitamin D 600-200 MG-UNIT tablet, Take 1 tablet by mouth 2 (two) times a day., Disp: , Rfl:   •  ferrous sulfate 325 (65 FE) MG tablet, Take 325 mg by mouth Daily With Breakfast., Disp: , Rfl:   •  fluticasone (FLONASE) 50 MCG/ACT nasal spray, 2 sprays by  Each Nare route Daily As Needed for Rhinitis or Allergies., Disp: , Rfl:   •  folic acid (FOLVITE) 400 MCG tablet, Take 400 mcg by mouth Daily., Disp: , Rfl:   •  gabapentin (NEURONTIN) 100 MG capsule, Take 300 mg by mouth Every Night., Disp: , Rfl:   •  mycophenolate (CELLCEPT) 500 MG tablet, Take 1,000 mg by mouth Every 12 (Twelve) Hours., Disp: , Rfl:   •  riTUXimab (RITUXAN IV), Infuse 1 dose into a venous catheter Every 6 (Six) Months., Disp: , Rfl:   •  traMADol (ULTRAM) 50 MG tablet, Take 50 mg by mouth 2 (Two) Times a Day As Needed for Moderate Pain ., Disp: , Rfl:   •  vitamin B-12 (CYANOCOBALAMIN) 500 MCG tablet, Take 500 mcg by mouth Daily., Disp: , Rfl:         Review of Systems   Constitutional: Negative.    HENT: Negative.    Eyes: Negative.    Respiratory: Negative.    Cardiovascular: Negative.    Gastrointestinal: Negative.    Endocrine: Negative.    Genitourinary: Negative.    Musculoskeletal: Negative.    Skin: Negative.    Allergic/Immunologic: Negative.    Neurological: Negative.    Hematological: Negative.    Psychiatric/Behavioral: Negative.        Vital Signs  There were no vitals taken for this visit.    Physical Exam  Vitals and nursing note reviewed.   Constitutional:       Appearance: She is well-developed.   HENT:      Head: Normocephalic and atraumatic.   Cardiovascular:      Rate and Rhythm: Normal rate.   Pulmonary:      Effort: Pulmonary effort is normal.   Abdominal:      General: There is no distension.      Palpations: Abdomen is soft. There is no mass.      Tenderness: There is no abdominal tenderness. There is no guarding.   Genitourinary:     Vagina: No vaginal discharge.   Musculoskeletal:         General: No tenderness or deformity. Normal range of motion.      Cervical back: Normal range of motion.   Skin:     General: Skin is warm and dry.      Coloration: Skin is not pale.      Findings: No erythema or rash.   Neurological:      Mental Status: She is alert and oriented to  person, place, and time.   Psychiatric:         Behavior: Behavior normal.         Thought Content: Thought content normal.         Judgment: Judgment normal.             IMPRESSION:    Menorrhagia with regular cycle                                    PLAN:    DIAGNOSTIC HYSTEROSCOPY, DILATATION AND CURETTAGE.    RISKS, ALTERNATIVES, COMPLICATIONS OF THE PROCEDURE INCLUDING BUT NOT LIMITED TO:    INTRAOPERATIVE RISKS: INJURY TO INTERNAL AND ADJACENT ORGANS AND STRUCTURES (BOWEL, BLADDER, URETER,BLOOD VESSELS) OR HEMORRHAGE REQUIRING FURTHER SURGERY (LAPAROTOMY),  POSSIBLE NON-DIAGNOSTIC FINDINGS, DISCOVERY OF POSSIBLE MALIGNANCY, INFECTION, AND DEATH;   POSTOP COMPLICATIONS: BLEEDING, INFECTION (REQUIRING POSSIBLE REOPERATION), FAILURE OF GOAL OF SURGERY AND RECURRENCE OF ORIGINAL SYMPTOMS, PNEUMONIA, PULMONARY EMBOLISM, AND DEATH;  WERE EXPLAINED TO THE PT WHO VERBALIZED HER UNDERSTANDING.             I discussed the patients findings and my recommendations with patient.     Pablito Vo MD  06/09/21  12:07 EDT

## 2021-06-10 ENCOUNTER — HOSPITAL ENCOUNTER (OUTPATIENT)
Facility: HOSPITAL | Age: 31
Setting detail: HOSPITAL OUTPATIENT SURGERY
Discharge: HOME | End: 2021-06-10
Attending: OBSTETRICS & GYNECOLOGY | Admitting: OBSTETRICS & GYNECOLOGY

## 2021-06-10 ENCOUNTER — ANESTHESIA (OUTPATIENT)
Dept: PERIOP | Facility: HOSPITAL | Age: 31
End: 2021-06-10

## 2021-06-10 VITALS
RESPIRATION RATE: 14 BRPM | HEART RATE: 79 BPM | SYSTOLIC BLOOD PRESSURE: 111 MMHG | OXYGEN SATURATION: 98 % | WEIGHT: 120.8 LBS | DIASTOLIC BLOOD PRESSURE: 82 MMHG | BODY MASS INDEX: 22.09 KG/M2 | TEMPERATURE: 98.1 F

## 2021-06-10 DIAGNOSIS — Z30.2 REQUEST FOR STERILIZATION: ICD-10-CM

## 2021-06-10 DIAGNOSIS — N84.0 ENDOMETRIAL POLYP: ICD-10-CM

## 2021-06-10 DIAGNOSIS — N92.0 MENORRHAGIA WITH REGULAR CYCLE: ICD-10-CM

## 2021-06-10 LAB — HCG SERPL QL: NEGATIVE

## 2021-06-10 PROCEDURE — 25010000002 KETOROLAC TROMETHAMINE PER 15 MG: Performed by: NURSE ANESTHETIST, CERTIFIED REGISTERED

## 2021-06-10 PROCEDURE — 84703 CHORIONIC GONADOTROPIN ASSAY: CPT | Performed by: OBSTETRICS & GYNECOLOGY

## 2021-06-10 PROCEDURE — 25010000002 ONDANSETRON PER 1 MG: Performed by: ANESTHESIOLOGY

## 2021-06-10 PROCEDURE — 25010000003 CEFAZOLIN SODIUM-DEXTROSE 2-3 GM-%(50ML) RECONSTITUTED SOLUTION: Performed by: OBSTETRICS & GYNECOLOGY

## 2021-06-10 PROCEDURE — 25010000002 MIDAZOLAM PER 1MG: Performed by: ANESTHESIOLOGY

## 2021-06-10 PROCEDURE — 58558 HYSTEROSCOPY BIOPSY: CPT | Performed by: OBSTETRICS & GYNECOLOGY

## 2021-06-10 PROCEDURE — 25010000002 DEXAMETHASONE PER 1 MG: Performed by: ANESTHESIOLOGY

## 2021-06-10 PROCEDURE — 88305 TISSUE EXAM BY PATHOLOGIST: CPT | Performed by: OBSTETRICS & GYNECOLOGY

## 2021-06-10 PROCEDURE — 25010000002 PROPOFOL 10 MG/ML EMULSION: Performed by: NURSE ANESTHETIST, CERTIFIED REGISTERED

## 2021-06-10 RX ORDER — SODIUM CHLORIDE 9 MG/ML
40 INJECTION, SOLUTION INTRAVENOUS AS NEEDED
Status: DISCONTINUED | OUTPATIENT
Start: 2021-06-10 | End: 2021-06-10 | Stop reason: HOSPADM

## 2021-06-10 RX ORDER — FENTANYL CITRATE 50 UG/ML
50 INJECTION, SOLUTION INTRAMUSCULAR; INTRAVENOUS
Status: DISCONTINUED | OUTPATIENT
Start: 2021-06-10 | End: 2021-06-10 | Stop reason: HOSPADM

## 2021-06-10 RX ORDER — ACETAMINOPHEN 500 MG
1000 TABLET ORAL ONCE
Status: COMPLETED | OUTPATIENT
Start: 2021-06-10 | End: 2021-06-10

## 2021-06-10 RX ORDER — SODIUM CHLORIDE 0.9 % (FLUSH) 0.9 %
10 SYRINGE (ML) INJECTION AS NEEDED
Status: DISCONTINUED | OUTPATIENT
Start: 2021-06-10 | End: 2021-06-10 | Stop reason: HOSPADM

## 2021-06-10 RX ORDER — SODIUM CHLORIDE 0.9 % (FLUSH) 0.9 %
3 SYRINGE (ML) INJECTION EVERY 12 HOURS SCHEDULED
Status: DISCONTINUED | OUTPATIENT
Start: 2021-06-10 | End: 2021-06-10 | Stop reason: HOSPADM

## 2021-06-10 RX ORDER — FAMOTIDINE 10 MG/ML
20 INJECTION, SOLUTION INTRAVENOUS
Status: COMPLETED | OUTPATIENT
Start: 2021-06-10 | End: 2021-06-10

## 2021-06-10 RX ORDER — KETOROLAC TROMETHAMINE 30 MG/ML
30 INJECTION, SOLUTION INTRAMUSCULAR; INTRAVENOUS EVERY 6 HOURS PRN
Status: COMPLETED | OUTPATIENT
Start: 2021-06-10 | End: 2021-06-10

## 2021-06-10 RX ORDER — PROPOFOL 10 MG/ML
VIAL (ML) INTRAVENOUS AS NEEDED
Status: DISCONTINUED | OUTPATIENT
Start: 2021-06-10 | End: 2021-06-10 | Stop reason: SURG

## 2021-06-10 RX ORDER — CEFAZOLIN SODIUM 2 G/50ML
2 SOLUTION INTRAVENOUS ONCE
Status: COMPLETED | OUTPATIENT
Start: 2021-06-10 | End: 2021-06-10

## 2021-06-10 RX ORDER — KETAMINE HYDROCHLORIDE 10 MG/ML
INJECTION INTRAMUSCULAR; INTRAVENOUS AS NEEDED
Status: DISCONTINUED | OUTPATIENT
Start: 2021-06-10 | End: 2021-06-10 | Stop reason: SURG

## 2021-06-10 RX ORDER — PROPOFOL 10 MG/ML
VIAL (ML) INTRAVENOUS CONTINUOUS PRN
Status: DISCONTINUED | OUTPATIENT
Start: 2021-06-10 | End: 2021-06-10 | Stop reason: SURG

## 2021-06-10 RX ORDER — DEXAMETHASONE SODIUM PHOSPHATE 4 MG/ML
8 INJECTION, SOLUTION INTRA-ARTICULAR; INTRALESIONAL; INTRAMUSCULAR; INTRAVENOUS; SOFT TISSUE ONCE
Status: COMPLETED | OUTPATIENT
Start: 2021-06-10 | End: 2021-06-10

## 2021-06-10 RX ORDER — ONDANSETRON 2 MG/ML
4 INJECTION INTRAMUSCULAR; INTRAVENOUS ONCE AS NEEDED
Status: COMPLETED | OUTPATIENT
Start: 2021-06-10 | End: 2021-06-10

## 2021-06-10 RX ORDER — SODIUM CHLORIDE 0.9 % (FLUSH) 0.9 %
1-10 SYRINGE (ML) INJECTION AS NEEDED
Status: DISCONTINUED | OUTPATIENT
Start: 2021-06-10 | End: 2021-06-10 | Stop reason: HOSPADM

## 2021-06-10 RX ORDER — SODIUM CHLORIDE 0.9 % (FLUSH) 0.9 %
10 SYRINGE (ML) INJECTION EVERY 12 HOURS SCHEDULED
Status: DISCONTINUED | OUTPATIENT
Start: 2021-06-10 | End: 2021-06-10 | Stop reason: HOSPADM

## 2021-06-10 RX ORDER — MIDAZOLAM HYDROCHLORIDE 2 MG/2ML
1 INJECTION, SOLUTION INTRAMUSCULAR; INTRAVENOUS
Status: DISCONTINUED | OUTPATIENT
Start: 2021-06-10 | End: 2021-06-10 | Stop reason: HOSPADM

## 2021-06-10 RX ORDER — DEXMEDETOMIDINE HYDROCHLORIDE 100 UG/ML
INJECTION, SOLUTION INTRAVENOUS AS NEEDED
Status: DISCONTINUED | OUTPATIENT
Start: 2021-06-10 | End: 2021-06-10 | Stop reason: SURG

## 2021-06-10 RX ORDER — GLYCOPYRROLATE 0.2 MG/ML
INJECTION INTRAMUSCULAR; INTRAVENOUS AS NEEDED
Status: DISCONTINUED | OUTPATIENT
Start: 2021-06-10 | End: 2021-06-10 | Stop reason: SURG

## 2021-06-10 RX ORDER — LIDOCAINE HYDROCHLORIDE 10 MG/ML
0.5 INJECTION, SOLUTION EPIDURAL; INFILTRATION; INTRACAUDAL; PERINEURAL ONCE AS NEEDED
Status: DISCONTINUED | OUTPATIENT
Start: 2021-06-10 | End: 2021-06-10 | Stop reason: HOSPADM

## 2021-06-10 RX ORDER — OXYCODONE HYDROCHLORIDE AND ACETAMINOPHEN 5; 325 MG/1; MG/1
1 TABLET ORAL ONCE AS NEEDED
Status: COMPLETED | OUTPATIENT
Start: 2021-06-10 | End: 2021-06-10

## 2021-06-10 RX ORDER — SODIUM CHLORIDE, SODIUM LACTATE, POTASSIUM CHLORIDE, CALCIUM CHLORIDE 600; 310; 30; 20 MG/100ML; MG/100ML; MG/100ML; MG/100ML
9 INJECTION, SOLUTION INTRAVENOUS CONTINUOUS PRN
Status: DISCONTINUED | OUTPATIENT
Start: 2021-06-10 | End: 2021-06-10 | Stop reason: HOSPADM

## 2021-06-10 RX ORDER — SODIUM CHLORIDE 9 MG/ML
INJECTION, SOLUTION INTRAVENOUS AS NEEDED
Status: DISCONTINUED | OUTPATIENT
Start: 2021-06-10 | End: 2021-06-10 | Stop reason: HOSPADM

## 2021-06-10 RX ORDER — ONDANSETRON 2 MG/ML
4 INJECTION INTRAMUSCULAR; INTRAVENOUS ONCE AS NEEDED
Status: DISCONTINUED | OUTPATIENT
Start: 2021-06-10 | End: 2021-06-10 | Stop reason: HOSPADM

## 2021-06-10 RX ADMIN — FAMOTIDINE 20 MG: 10 INJECTION INTRAVENOUS at 10:39

## 2021-06-10 RX ADMIN — PROPOFOL 55 MCG/KG/MIN: 10 INJECTION, EMULSION INTRAVENOUS at 11:59

## 2021-06-10 RX ADMIN — PROPOFOL 50 MG: 10 INJECTION, EMULSION INTRAVENOUS at 11:59

## 2021-06-10 RX ADMIN — DEXMEDETOMIDINE 20 MCG: 100 INJECTION, SOLUTION, CONCENTRATE INTRAVENOUS at 11:59

## 2021-06-10 RX ADMIN — MIDAZOLAM HYDROCHLORIDE 1 MG: 1 INJECTION, SOLUTION INTRAMUSCULAR; INTRAVENOUS at 11:39

## 2021-06-10 RX ADMIN — DEXAMETHASONE SODIUM PHOSPHATE 8 MG: 4 INJECTION, SOLUTION INTRAMUSCULAR; INTRAVENOUS at 10:39

## 2021-06-10 RX ADMIN — GLYCOPYRROLATE 0.1 MG: 0.2 INJECTION INTRAMUSCULAR; INTRAVENOUS at 11:59

## 2021-06-10 RX ADMIN — OXYCODONE HYDROCHLORIDE AND ACETAMINOPHEN 1 TABLET: 5; 325 TABLET ORAL at 12:51

## 2021-06-10 RX ADMIN — ACETAMINOPHEN 1000 MG: 500 TABLET, FILM COATED ORAL at 10:39

## 2021-06-10 RX ADMIN — KETOROLAC TROMETHAMINE 30 MG: 30 INJECTION, SOLUTION INTRAMUSCULAR; INTRAVENOUS at 12:28

## 2021-06-10 RX ADMIN — CEFAZOLIN SODIUM 2 G: 2 SOLUTION INTRAVENOUS at 11:59

## 2021-06-10 RX ADMIN — SODIUM CHLORIDE, POTASSIUM CHLORIDE, SODIUM LACTATE AND CALCIUM CHLORIDE: 600; 310; 30; 20 INJECTION, SOLUTION INTRAVENOUS at 11:59

## 2021-06-10 RX ADMIN — ONDANSETRON 4 MG: 2 INJECTION INTRAMUSCULAR; INTRAVENOUS at 10:38

## 2021-06-10 RX ADMIN — KETAMINE HYDROCHLORIDE 25 MG: 10 INJECTION, SOLUTION INTRAMUSCULAR; INTRAVENOUS at 11:59

## 2021-06-10 NOTE — OP NOTE
OPERATIVE REPORT      PROCEDURE:   DIAGNOSTIC HYSTEROSCOPY, DILATATION & CURETTAGE    PREOP DIAGNOSIS:  Menorrhagia with regular cycle.    POSTOP DIAGNOSIS:  same    SURGEON:   Tavo    ASSIST:  none    ANESTHESIA:  MAC    EBL:   1cc    IVFS:  100cc    URINE OUTPUT:  5cc    COMPLICATIONS:  none    FINDINGS:  Normal endometrial cavity    SPECIMENS:  Endometrial curettings        DESCRIPTION OF THE PROCEDURE:     After informed consent was obtained, pt was taken to the OR and placed on the table in the DORSOLITHOTOMY position.  LMA was induced without difficulty.  Time out was done, no antibiotics were given. Pt was prepped and draped in the usual fashion.    An open graves speculum was placed in the vagina and the anterior lip of the cervix was grasped with a single toothed tenaculum.  The cervix appeared normal.  The uterus was sounded to 7 cms    The cervix was serially dilated with hanna dilators with no difficulty.   Hanna dilators were then used to serially dilate the endocervical canal sufficiently to admit the diagnostic hysteroscope.    Using sterile water, the hysteroscope was used to visualize the endometrial cavity in its entirety.  Both tubal ostia were seen, the cavity was smooth, and no masses were visualized; the endometrial cavity appeared normal.  The hysteroscope was removed.    A small serrated curette was used to curette out the entire endometrial cavity.  This specimen was sent for permanent section.      All instruments were removed.  There was no evidence of cervical laceration or uterine perforation.    All sponge, instrument counts were correct x 3 according to the OR personnel.  Pt went to  in satisfactory condition.      Pablito Vo MD  12:22 EDT  06/10/21

## 2021-06-10 NOTE — ANESTHESIA POSTPROCEDURE EVALUATION
Patient: Rachel Srinivasan    Procedure Summary     Date: 06/10/21 Room / Location:  [facility] /  [facility]    Anesthesia Start: 1157 Anesthesia Stop: 1224    Procedure: diagnostic hysteroscopy, dilatation and curettage (N/A Uterus) Diagnosis:       Menorrhagia with regular cycle      Endometrial polyp      Request for sterilization      (Menorrhagia with regular cycle [N92.0])      (Endometrial polyp [N84.0])      (Request for sterilization [Z30.2])    Surgeons: Pablito Vo MD Provider: Ede Zafar CRNA    Anesthesia Type: MAC ASA Status: 3          Anesthesia Type: MAC    Vitals  No vitals data found for the desired time range.          Post Anesthesia Care and Evaluation    Patient location during evaluation: PHASE II  Patient participation: complete - patient participated  Level of consciousness: awake and alert  Pain score: 2  Pain management: satisfactory to patient  Airway patency: patent  Anesthetic complications: No anesthetic complications  PONV Status: none  Cardiovascular status: acceptable  Respiratory status: acceptable  Hydration status: acceptable

## 2021-06-10 NOTE — ANESTHESIA PREPROCEDURE EVALUATION
Anesthesia Evaluation     Patient summary reviewed and Nursing notes reviewed   no history of anesthetic complications:  NPO Solid Status: > 8 hours  NPO Liquid Status: > 2 hours           Airway   Mallampati: III  TM distance: >3 FB  Neck ROM: full  Difficult intubation highly probable and Small opening  Dental - normal exam     Pulmonary - normal exam    breath sounds clear to auscultation  (+) a smoker (none for 7 years) Former,   Sleep apnea: problable.    ROS comment: Probable interstitial lung disease-normal PFTs and   Cardiovascular - normal exam  Exercise tolerance: poor (<4 METS)    Rhythm: regular  Rate: normal        Neuro/Psych  (+) headaches (miraines), numbness (facial with migraines),     Dizziness: dizziness with migraines.    ROS Comment: History of weakness due to autoimmune disorder  GI/Hepatic/Renal/Endo    (+)   renal disease stones,   GERD: with steroids, no problems at present time.    Musculoskeletal     (+) back pain, chronic pain,   Abdominal  - normal exam   Substance History - negative use     OB/GYN negative ob/gyn ROS         Other   arthritis (generalized), autoimmune disease (antisynthetase syndrome) , blood dyscrasia anemia,     ROS/Med Hx Other: gatorade [id number]                Anesthesia Plan    ASA 3     MAC   (MAC with GA as needed-agreeable to patient)  intravenous induction     Anesthetic plan, all risks, benefits, and alternatives have been provided, discussed and informed consent has been obtained with: patient and spouse/significant other.  Use of blood products discussed with patient and spouse/significant other  Consented to blood products.

## 2021-06-10 NOTE — INTERVAL H&P NOTE
H&P reviewed. The patient was examined and there are no changes to the H&P.    /85 (BP Location: Left arm, Patient Position: Lying)   Pulse 98   Temp 98.1 °F (36.7 °C) (Infrared)   Resp 16   Wt 54.8 kg (120 lb 12.8 oz)   LMP 05/31/2021   SpO2 99%   BMI 22.09 kg/m²     Medications Prior to Admission   Medication Sig Dispense Refill Last Dose    butalbital-acetaminophen-caffeine (FIORICET, ESGIC) -40 MG per tablet Take 1 tablet by mouth Every 6 (Six) Hours As Needed for Headache.   Past Month at Unknown time    ferrous sulfate 325 (65 FE) MG tablet Take 325 mg by mouth Daily With Breakfast.   6/1/2021 at Unknown time    fluticasone (FLONASE) 50 MCG/ACT nasal spray 2 sprays by Each Nare route Daily As Needed for Rhinitis or Allergies.   Past Month at Unknown time    folic acid (FOLVITE) 400 MCG tablet Take 400 mcg by mouth Daily.   6/1/2021 at Unknown time    gabapentin (NEURONTIN) 100 MG capsule Take 300 mg by mouth Every Night.   Past Month at Unknown time    mycophenolate (CELLCEPT) 500 MG tablet Take 1,000 mg by mouth Every 12 (Twelve) Hours.   6/1/2021 at Unknown time    traMADol (ULTRAM) 50 MG tablet Take 50 mg by mouth 2 (Two) Times a Day As Needed for Moderate Pain .   Past Month at Unknown time    vitamin B-12 (CYANOCOBALAMIN) 500 MCG tablet Take 500 mcg by mouth Daily.   6/1/2021 at Unknown time    Calcium Carbonate-Vitamin D 600-200 MG-UNIT tablet Take 1 tablet by mouth 2 (two) times a day.   6/1/2021    riTUXimab (RITUXAN IV) Infuse 1 dose into a venous catheter Every 6 (Six) Months.   5/1/2021

## 2021-06-11 LAB
LAB AP CASE REPORT: NORMAL
PATH REPORT.FINAL DX SPEC: NORMAL
PATH REPORT.GROSS SPEC: NORMAL

## 2021-06-24 ENCOUNTER — TRANSCRIBE ORDERS (OUTPATIENT)
Dept: ADMINISTRATIVE | Facility: HOSPITAL | Age: 31
End: 2021-06-24

## 2021-06-24 DIAGNOSIS — E04.1 THYROID NODULE: Primary | ICD-10-CM

## 2021-06-29 ENCOUNTER — APPOINTMENT (OUTPATIENT)
Dept: ULTRASOUND IMAGING | Facility: HOSPITAL | Age: 31
End: 2021-06-29

## 2021-06-30 ENCOUNTER — HOSPITAL ENCOUNTER (OUTPATIENT)
Dept: ULTRASOUND IMAGING | Facility: HOSPITAL | Age: 31
Discharge: HOME | End: 2021-06-30
Admitting: NURSE PRACTITIONER

## 2021-06-30 ENCOUNTER — OFFICE VISIT (OUTPATIENT)
Dept: OBSTETRICS AND GYNECOLOGY | Facility: CLINIC | Age: 31
End: 2021-06-30

## 2021-06-30 VITALS
WEIGHT: 120 LBS | HEIGHT: 62 IN | DIASTOLIC BLOOD PRESSURE: 62 MMHG | SYSTOLIC BLOOD PRESSURE: 100 MMHG | BODY MASS INDEX: 22.08 KG/M2

## 2021-06-30 DIAGNOSIS — N94.6 DYSMENORRHEA: ICD-10-CM

## 2021-06-30 DIAGNOSIS — E04.1 THYROID NODULE: ICD-10-CM

## 2021-06-30 DIAGNOSIS — N92.0 MENORRHAGIA WITH REGULAR CYCLE: Primary | ICD-10-CM

## 2021-06-30 DIAGNOSIS — D89.89 ANTISYNTHETASE SYNDROME (HCC): ICD-10-CM

## 2021-06-30 DIAGNOSIS — Z78.9: ICD-10-CM

## 2021-06-30 PROBLEM — N84.0 ENDOMETRIAL POLYP: Status: RESOLVED | Noted: 2021-05-25 | Resolved: 2021-06-30

## 2021-06-30 PROCEDURE — 76536 US EXAM OF HEAD AND NECK: CPT

## 2021-06-30 PROCEDURE — 99213 OFFICE O/P EST LOW 20 MIN: CPT | Performed by: OBSTETRICS & GYNECOLOGY

## 2021-06-30 RX ORDER — METHYLPREDNISOLONE 4 MG/1
TABLET ORAL
COMMUNITY
Start: 2021-04-06 | End: 2021-12-17

## 2021-06-30 RX ORDER — DOXEPIN HYDROCHLORIDE 25 MG/1
25 CAPSULE ORAL DAILY
COMMUNITY
Start: 2021-06-10 | End: 2021-12-17

## 2021-06-30 RX ORDER — NORGESTIMATE AND ETHINYL ESTRADIOL 0.25-0.035
1 KIT ORAL DAILY
Qty: 3 EACH | Refills: 6 | Status: SHIPPED | OUTPATIENT
Start: 2021-06-30 | End: 2021-12-17

## 2021-06-30 RX ORDER — TRAZODONE HYDROCHLORIDE 50 MG/1
50 TABLET ORAL DAILY
COMMUNITY
Start: 2021-06-10 | End: 2021-12-17

## 2021-07-12 ENCOUNTER — HOSPITAL ENCOUNTER (OUTPATIENT)
Dept: MRI IMAGING | Facility: HOSPITAL | Age: 31
Discharge: HOME | End: 2021-07-12
Admitting: NURSE PRACTITIONER

## 2021-07-12 DIAGNOSIS — M54.50 LOW BACK PAIN, UNSPECIFIED BACK PAIN LATERALITY, UNSPECIFIED CHRONICITY, UNSPECIFIED WHETHER SCIATICA PRESENT: ICD-10-CM

## 2021-07-12 PROCEDURE — 72148 MRI LUMBAR SPINE W/O DYE: CPT

## 2021-08-26 ENCOUNTER — TRANSCRIBE ORDERS (OUTPATIENT)
Dept: ADMINISTRATIVE | Facility: HOSPITAL | Age: 31
End: 2021-08-26

## 2021-08-26 DIAGNOSIS — N20.2 CALCULUS OF KIDNEY AND URETER: Primary | ICD-10-CM

## 2021-09-03 ENCOUNTER — HOSPITAL ENCOUNTER (OUTPATIENT)
Dept: CT IMAGING | Facility: HOSPITAL | Age: 31
Discharge: HOME | End: 2021-09-03
Admitting: UROLOGY

## 2021-09-03 DIAGNOSIS — N20.2 CALCULUS OF KIDNEY AND URETER: ICD-10-CM

## 2021-09-03 PROCEDURE — 74176 CT ABD & PELVIS W/O CONTRAST: CPT

## 2021-10-07 ENCOUNTER — TRANSCRIBE ORDERS (OUTPATIENT)
Dept: MRI IMAGING | Facility: HOSPITAL | Age: 31
End: 2021-10-07

## 2021-10-07 DIAGNOSIS — R20.2 PARESTHESIA OF SKIN: Primary | ICD-10-CM

## 2021-10-21 ENCOUNTER — HOSPITAL ENCOUNTER (OUTPATIENT)
Dept: MRI IMAGING | Facility: HOSPITAL | Age: 31
Discharge: HOME | End: 2021-10-21
Admitting: INTERNAL MEDICINE

## 2021-10-21 DIAGNOSIS — R20.2 PARESTHESIA OF SKIN: ICD-10-CM

## 2021-10-21 PROCEDURE — A9577 INJ MULTIHANCE: HCPCS | Performed by: INTERNAL MEDICINE

## 2021-10-21 PROCEDURE — 0 GADOBENATE DIMEGLUMINE 529 MG/ML SOLUTION: Performed by: INTERNAL MEDICINE

## 2021-10-21 PROCEDURE — 70553 MRI BRAIN STEM W/O & W/DYE: CPT

## 2021-10-21 RX ADMIN — GADOBENATE DIMEGLUMINE 10 ML: 529 INJECTION, SOLUTION INTRAVENOUS at 08:10

## 2022-01-19 ENCOUNTER — LAB (OUTPATIENT)
Dept: LAB | Facility: HOSPITAL | Age: 32
End: 2022-01-19

## 2022-01-19 ENCOUNTER — OFFICE VISIT (OUTPATIENT)
Dept: NEUROLOGY | Facility: CLINIC | Age: 32
End: 2022-01-19

## 2022-01-19 VITALS
HEART RATE: 80 BPM | BODY MASS INDEX: 22.38 KG/M2 | HEIGHT: 62 IN | DIASTOLIC BLOOD PRESSURE: 70 MMHG | WEIGHT: 121.6 LBS | SYSTOLIC BLOOD PRESSURE: 128 MMHG | OXYGEN SATURATION: 98 %

## 2022-01-19 DIAGNOSIS — G62.9 POLYNEUROPATHY: ICD-10-CM

## 2022-01-19 DIAGNOSIS — G62.9 POLYNEUROPATHY: Primary | ICD-10-CM

## 2022-01-19 LAB — HBA1C MFR BLD: 5.1 % (ref 4.8–5.6)

## 2022-01-19 PROCEDURE — 84207 ASSAY OF VITAMIN B-6: CPT | Performed by: PSYCHIATRY & NEUROLOGY

## 2022-01-19 PROCEDURE — 84155 ASSAY OF PROTEIN SERUM: CPT | Performed by: PSYCHIATRY & NEUROLOGY

## 2022-01-19 PROCEDURE — 36415 COLL VENOUS BLD VENIPUNCTURE: CPT | Performed by: PSYCHIATRY & NEUROLOGY

## 2022-01-19 PROCEDURE — 86235 NUCLEAR ANTIGEN ANTIBODY: CPT | Performed by: PSYCHIATRY & NEUROLOGY

## 2022-01-19 PROCEDURE — 82164 ANGIOTENSIN I ENZYME TEST: CPT | Performed by: PSYCHIATRY & NEUROLOGY

## 2022-01-19 PROCEDURE — 82525 ASSAY OF COPPER: CPT | Performed by: PSYCHIATRY & NEUROLOGY

## 2022-01-19 PROCEDURE — 83655 ASSAY OF LEAD: CPT | Performed by: PSYCHIATRY & NEUROLOGY

## 2022-01-19 PROCEDURE — 86038 ANTINUCLEAR ANTIBODIES: CPT | Performed by: PSYCHIATRY & NEUROLOGY

## 2022-01-19 PROCEDURE — 84446 ASSAY OF VITAMIN E: CPT | Performed by: PSYCHIATRY & NEUROLOGY

## 2022-01-19 PROCEDURE — 82784 ASSAY IGA/IGD/IGG/IGM EACH: CPT | Performed by: PSYCHIATRY & NEUROLOGY

## 2022-01-19 PROCEDURE — 86225 DNA ANTIBODY NATIVE: CPT | Performed by: PSYCHIATRY & NEUROLOGY

## 2022-01-19 PROCEDURE — 83825 ASSAY OF MERCURY: CPT | Performed by: PSYCHIATRY & NEUROLOGY

## 2022-01-19 PROCEDURE — 82595 ASSAY OF CRYOGLOBULIN: CPT | Performed by: PSYCHIATRY & NEUROLOGY

## 2022-01-19 PROCEDURE — 83036 HEMOGLOBIN GLYCOSYLATED A1C: CPT

## 2022-01-19 PROCEDURE — 84165 PROTEIN E-PHORESIS SERUM: CPT | Performed by: PSYCHIATRY & NEUROLOGY

## 2022-01-19 PROCEDURE — 99244 OFF/OP CNSLTJ NEW/EST MOD 40: CPT | Performed by: PSYCHIATRY & NEUROLOGY

## 2022-01-19 PROCEDURE — 84425 ASSAY OF VITAMIN B-1: CPT | Performed by: PSYCHIATRY & NEUROLOGY

## 2022-01-19 PROCEDURE — 82175 ASSAY OF ARSENIC: CPT | Performed by: PSYCHIATRY & NEUROLOGY

## 2022-01-19 PROCEDURE — 86334 IMMUNOFIX E-PHORESIS SERUM: CPT | Performed by: PSYCHIATRY & NEUROLOGY

## 2022-01-19 NOTE — PROGRESS NOTES
Subjective:     Patient ID: Rachel Srinivasan is a 31 y.o. female.    Ms. Srinivasan is a 31-year-old right-handed female with history of arthritis, migraines, kidney stones, shingles, lung and thyroid nodules, and antisynthase syndrome who is seen in consultation at the request of Dr. Jordan for the evaluation of paresthesias.  I reviewed the patient's records.  I reviewed the referring physician's note from October 7, 2021.  He reports the patient had some tingling in her extremities and hypersensitivity as well as distortion of's sensation.  He reports that she has numbness and paresthesias.  Patient had a brain MRI done October 21, 2021 that was normal.  Blood work was drawn however not sent with her referral and not available to review in our system.    Reports an icy hot feeling in UE, LE, and face.  Started in Oct of 2021.  Air that is breathing in is cold.  Symptoms are intermittent.  On and off all day.  Can't tell what is hot and what is cold.  It feels like a burning coldness. Not sure if other feel cold.  Usually feels hot all of the time.  Gets a warm sensation in her legs fairly often.  Was on steroids for 7 years.  More in hands and arms.  Symptoms are symmetric.  Not sure of triggers.  Yamini worsened after her rituxan infusion.  Was also trying to reduce cellcept at the time. Was diagnosed with antisynthetase syndrome in 2014.  Was having a lot of weakness.  Her liver enzymes were elevated.  Was initially diagnosed with rhabdo because her CK was really high.  Never had an EMG.  Had a muscle and skin biopsy.  Has been since treated by a rheumatologist.  Currently on cellcept and rituxan.  Was on imuran, IVIG, prograf, and steriods in the past.  Plans on started methotrexate soon.      Pain? No, more annoying  Numbness? Had some facial numbness (both sides) and maybe some pins/needles  Motor symptoms (weakness, ataxia)? No new weakness, no balance problems  PMH? DM, EtOH, renal failure, polyarthritis  nodosa, lupus, churg-bebeto, hepatitis, HIV, thyroid disease, vegetarian - no  Recent travel? (leprosy from [place], [place], and [place]) - no  Secondary to drugs? (INH, vincristine, hydralazine, disulfiram, cisplatin, metronidazole) - flagyl sounds familiar  Family history? no  Dry eyes, dry mouth? no  High arches/hammer toes? no  Autonomic (early satiety, bloating, constipation, diarrhea, ED, urinary incontinence, sweating abnormalities, orthostasis?) - has chronic constipation, thinks that she sweats too much, has been waking up with night sweats  Occupational hazards? - no  High risk sexual history? IV drug use? - no    The following portions of the patient's history were reviewed and updated as appropriate: allergies, current medications, past family history, past medical history, past social history, past surgical history and problem list.    Review of Systems     Objective:    Neurologic Exam    Physical Exam   **The patient is wearing a mask**  Constitutional:  Vital signs reviewed.  No apparent distress.  Well groomed.  Eyes:  No injection, no icterus.    Respiratory:  Normal effort.  Clear to auscultation bilaterally.  Cardiovascular:  Regular rate and rhythm.  No murmurs.  No carotid bruits. Symmetric radial pulses.  Musculoskeletal: Normal station.  Gait steady.  Normal arm swing.  Patient able to walk on heels and toes.  Tandem gait intact.  Romberg negative.  Muscle tone and bulk normal in the bilateral upper and lower extremities.  Strength is 5/5 in the bilateral upper and lower extremities proximally and distally unless otherwise specified in the neurological exam.  Skin:  No rashes.  Warm, dry, and intact.  Psychiatric:  Good mood.  Normal affect.    Neurologic:  Mental status-  The patient is alert and oriented to person, place and time. Attention/concentration is within normal limits.  Speech is fluent without dysarthria.  The patient is able to name, repeat and follow complex commands without  difficulty.  Immediate memory and delayed recall intact (3/3 words immediate and after 4 minutes).  Fund of knowledge normal.  Cranial nerves- Pupils equally round and reactive to light with intact accomodation.  Visual fields intact.  Extraocular movements intact.  Facial sensation intact.   Hearing intact to finger-rub bilaterally.  SCM and trapezius are 5/5 bilaterally.    Motor-  See musculoskeletal above.  No tremor.  Reflexes- 2+ in the bilateral biceps, brachioradialis, patellar and achilles.  Toes down-going bilaterally.  Sensation- Intact to pinprick and vibration in bilateral upper and lower extremities symmetrically.  Coordination- Intact to finger tapping and heel knee shin bilaterally.   Gait- See musculoskeletal exam above.       Assessment/Plan:    The patient is a 31-year-old right-handed female with a history of arthritis, migraines, kidney stones, shingles, lung and thyroid nodules, and antisyntheses syndrome who presents today for the valuation paresthesias.    1.  Paresthesias-I suspect that the patient may have a small fiber neuropathy associated with her autoimmune disease.  For further evaluation I recommend some blood work.  I would also like to get an EMG to evaluate for large fiber involvement.  Depending on these results we may want to proceed with a skin biopsy.  If her work-up is unrevealing, we may want to [facility] to [facility].    I recommend follow up with one of our APRNs or our PA, Karen, after testing is completed.         Problems Addressed this Visit     None      Visit Diagnoses     Polyneuropathy    -  Primary    Relevant Orders    Vitamin E    Vitamin B6    Vitamin B1, Whole Blood    Sjogren's Antibody, Anti-SS-A / -SS-B    Protein Elec + Interp, Serum    Immunofixation, Serum    Hemoglobin A1c    Heavy Metals, Blood    Cryoglobulin    Copper, Serum    CHERRI    Angiotensin Converting Enzyme    EMG & Nerve Conduction Test      Diagnoses        Codes Comments    Polyneuropathy    -  Primary ICD-10-CM: G62.9  ICD-9-CM: 356.9

## 2022-01-20 LAB
ACE SERPL-CCNC: 70 U/L (ref 14–82)
ALBUMIN SERPL ELPH-MCNC: 3.1 G/DL (ref 2.9–4.4)
ALBUMIN/GLOB SERPL: 1.2 {RATIO} (ref 0.7–1.7)
ALPHA1 GLOB SERPL ELPH-MCNC: 0.3 G/DL (ref 0–0.4)
ALPHA2 GLOB SERPL ELPH-MCNC: 0.7 G/DL (ref 0.4–1)
ANA SER QL: POSITIVE
B-GLOBULIN SERPL ELPH-MCNC: 1.1 G/DL (ref 0.7–1.3)
DSDNA AB SER-ACNC: <1 IU/ML (ref 0–9)
ENA SS-A AB SER-ACNC: <0.2 AI (ref 0–0.9)
ENA SS-B AB SER-ACNC: <0.2 AI (ref 0–0.9)
GAMMA GLOB SERPL ELPH-MCNC: 0.4 G/DL (ref 0.4–1.8)
GLOBULIN SER CALC-MCNC: 2.6 G/DL (ref 2.2–3.9)
IGA SERPL-MCNC: 102 MG/DL (ref 87–352)
IGG SERPL-MCNC: 411 MG/DL (ref 586–1602)
IGM SERPL-MCNC: 15 MG/DL (ref 26–217)
LABORATORY COMMENT REPORT: ABNORMAL
Lab: NORMAL
M PROTEIN SERPL ELPH-MCNC: ABNORMAL G/DL
PROT PATTERN SERPL ELPH-IMP: ABNORMAL
PROT PATTERN SERPL IFE-IMP: ABNORMAL
PROT SERPL-MCNC: 5.7 G/DL (ref 6–8.5)

## 2022-01-22 LAB — COPPER SERPL-MCNC: 146 UG/DL (ref 80–158)

## 2022-01-23 LAB
A-TOCOPHEROL VIT E SERPL-MCNC: 4.4 MG/L (ref 5.9–19.4)
GAMMA TOCOPHEROL SERPL-MCNC: 1.4 MG/L (ref 0.7–4.9)

## 2022-01-24 LAB
ARSENIC BLD-MCNC: <1 UG/L (ref 2–23)
CRYOGLOB SER QL 1D COLD INC: NORMAL
LEAD BLDV-MCNC: <1 UG/DL (ref 0–4)
MERCURY BLD-MCNC: <1 UG/L (ref 0–14.9)

## 2022-01-27 ENCOUNTER — PATIENT ROUNDING (BHMG ONLY) (OUTPATIENT)
Dept: NEUROLOGY | Facility: CLINIC | Age: 32
End: 2022-01-27

## 2022-01-27 LAB — VIT B1 BLD-SCNC: 115.5 NMOL/L (ref 66.5–200)

## 2022-01-30 ENCOUNTER — DOCUMENTATION (OUTPATIENT)
Dept: NEUROLOGY | Facility: CLINIC | Age: 32
End: 2022-01-30

## 2022-01-30 NOTE — PROGRESS NOTES
We received results of blood work that was done on October 7, 2021.  The patient's CMP was essentially normal.  Her liver enzymes were slightly elevated.  Her magnesium level was normal.  Her CBC was essentially normal, her CRP was normal.  Her B12 level was 533.  Her folate was 10.9.  Her TSH was normal.  Her vitamin D level was normal.

## 2022-02-06 LAB — VIT B6 SERPL-MCNC: 3.2 UG/L (ref 2–32.8)

## 2022-04-22 ENCOUNTER — TELEPHONE (OUTPATIENT)
Dept: NEUROLOGY | Facility: CLINIC | Age: 32
End: 2022-04-22

## 2022-04-25 ENCOUNTER — TELEPHONE (OUTPATIENT)
Dept: NEUROLOGY | Facility: CLINIC | Age: 32
End: 2022-04-25

## 2022-04-25 ENCOUNTER — HOSPITAL ENCOUNTER (OUTPATIENT)
Dept: INFUSION THERAPY | Facility: HOSPITAL | Age: 32
Discharge: HOME | End: 2022-04-25
Admitting: PSYCHIATRY & NEUROLOGY

## 2022-04-25 DIAGNOSIS — G62.9 POLYNEUROPATHY: ICD-10-CM

## 2022-04-25 PROCEDURE — 95913 NRV CNDJ TEST 13/> STUDIES: CPT

## 2022-04-25 PROCEDURE — 95886 MUSC TEST DONE W/N TEST COMP: CPT

## 2022-04-25 PROCEDURE — 95913 NRV CNDJ TEST 13/> STUDIES: CPT | Performed by: PSYCHIATRY & NEUROLOGY

## 2022-04-25 PROCEDURE — 95886 MUSC TEST DONE W/N TEST COMP: CPT | Performed by: PSYCHIATRY & NEUROLOGY

## 2022-04-25 NOTE — TELEPHONE ENCOUNTER
Per Dr Sampson patient needs to fu with Karen to discuss emg results- spoke with patient and appt scheduled

## 2022-04-25 NOTE — PROCEDURES
EMG and Nerve Conduction Studies    I.      Instrument used: Neuromax 1002  II.     Please see data sheets for tabular summary of NCS and details on methods, temperatures and lab standards.   III.    EMG muscles tested for upper extremity studies include the deltoid, biceps, triceps, pronator teres, extensor digitorum communis, first dorsal interosseous and abductor pollicis brevis.    IV.   EMG muscles tested for lower extremity studies include the vastus lateralis, tibialis anterior, peroneus longus, medial gastrocnemius and extensor digitorum brevis.    V.    Additional muscles tested as needed.  Paraspinal muscles tested as needed.   VI.   Please see data sheets for tabular summary of EMG findings.   VII. The complete report includes the data sheets.      Indication: Numbness in the hands and feet  History: 31-year-old white female with history of antisynthetase syndrome with severe myositis treated with steroids and now on Rituxan with good suppression of active muscle inflammation based on normal CPKs who developed numbness in the hands and feet a few months ago.  She has some neck pain and low back pain but not any radicular pain.      Ht: Not obtained  Wt: Not obtained  HbA1C:   Lab Results   Component Value Date    HGBA1C 5.10 01/19/2022     TSH:   Lab Results   Component Value Date    TSH 3.50 07/14/2014       Technical summary:  Nerve conduction studies were obtained in both arms and in the left leg with 1 comparison on the right leg.  Skin temperatures were very cold in the feet and it was difficult to keep the feet warm.  Temperature correction was not needed however.  Temperatures on the palms were at least 32 °C after warming the hands.  Needle examination was obtained on selected muscles in all 4 extremities.    Results:  1.  Normal median antidromic sensory distal latencies and amplitudes bilaterally.  2.  Normal ulnar antidromic sensory distal latencies and amplitudes bilaterally.  3.  Normal  median motor conduction velocities, distal latencies and amplitudes.  4.  Normal ulnar motor conduction velocities, distal latencies and amplitudes bilaterally.  5.  Normal left sural sensory distal latency and amplitude.  6.  Normal superficial peroneal sensory distal latencies and amplitudes bilaterally.  7.  Normal left peroneal motor conduction velocities, distal latency and amplitudes.  8.  Normal left tibial motor conduction velocity, distal latency and amplitudes.  9.  Needle examination of selected muscles in all 4 extremities showed positive sharp waves in the left vastus lateralis and left biceps.  In the biceps there were some wide polyphasics but also a population of small rapidly firing motor units.  Overall recruitment was diminished.  All other muscles tested showed normal insertional activities with normal motor units and recruitment patterns.  Cervical paraspinals at C5 showed a few positive sharp waves on the left but none on the right.  In the lumbar spine at L4 there were positive sharp waves bilaterally.    Impression:  Abnormal study showing irritative changes in 3 of the 4 paraspinal areas sampled as well as in the left biceps and left vastus lateralis.  There did seem to be a small rapidly firing group of motor units in the biceps but also some wider complex polyphasics.  The findings can be seen in a primary myositis but I cannot entirely exclude the findings being related to a left C5 radiculopathy or bilateral lumbosacral radiculopathies.  There was no evidence of polyneuropathy nor evidence for a median or ulnar neuropathy on either side.  Clinical correlation suggested.  Study results were discussed with the patient.    Enrique Cano M.D.              Dictated utilizing Dragon dictation.

## 2022-05-09 ENCOUNTER — OFFICE VISIT (OUTPATIENT)
Dept: NEUROLOGY | Facility: CLINIC | Age: 32
End: 2022-05-09

## 2022-05-09 VITALS
DIASTOLIC BLOOD PRESSURE: 68 MMHG | WEIGHT: 119 LBS | BODY MASS INDEX: 21.9 KG/M2 | HEART RATE: 92 BPM | HEIGHT: 62 IN | OXYGEN SATURATION: 99 % | SYSTOLIC BLOOD PRESSURE: 110 MMHG

## 2022-05-09 DIAGNOSIS — R20.2 PARESTHESIAS: Primary | ICD-10-CM

## 2022-05-09 DIAGNOSIS — D89.89 ANTISYNTHETASE SYNDROME: ICD-10-CM

## 2022-05-09 PROCEDURE — 99214 OFFICE O/P EST MOD 30 MIN: CPT | Performed by: PHYSICIAN ASSISTANT

## 2022-05-09 NOTE — PROGRESS NOTES
CC: Follow-up: paresthesias    HPI:  Rachel Srinivasan is a  31 y.o. right-handed female presents today for follow-up concerning some paresthesias/dyesthesias she has been having in her upper and lower extremities.  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  Additionally she has history of some migraines as well as kidney stones and reflux.  She was last seen by Dr. Sampson on 1/19/2022 and a summary of her condition is taken from previous note with amendments and additions as needed:    Patient indicates that the paresthesias started around October 2021.  She first began to experience numbness and tingling on her face and scalp but at the time suspected they were related to her migraines.  These symptoms subsided and she began noticing some numbness and tingling/pins-and-needles in her hands and extending up her arms as well as her feet extending up to about her mid calf.  Symptoms are bilateral.  She describes the sensations as feeling icy hot -feeling like her limbs are so cold that they burn.  She reports at the onset the symptoms were very intense and quite painful however over time the pain has relented and symptoms are now more just annoying.  She indicates that they are fairly constant though she does have bouts of more intense sensations, particularly when she is exposed to cold - this seems to be a trigger.    As mentioned she has been diagnosed with Antisynthetase syndrome and is followed by rheumatology at ; she was initially diagnosed back in 2014 apparently at this time she was hospitalized mostly for proximal muscle weakness (especially in her hip girdle) -  her creatinine kinase level was found to be extremely elevated around 25,000 she underwent a muscle biopsy which showed diffuse myocyte necrosis as well as generalized inflammation.  She was treated initially with prednisone then put on tacrolimus, methotrexate and IVIG.  Currently she is on Rituxan infusions as well as CellCept.  She indicates today that she actually very recently came off the CellCept and there is plans to resume her methotrexate.    Recent work-up to date has included laboratory studies looking for treatable causes of neuropathy, this revealed the following results:   Vitamin D was slightly low at 4.4  B6 level normal at 3.2  B1 level normal at 115.5  Sjogren's antibodies negative  Immunofixation and protein electrophoresis did not indicate decreased levels of IgG and IgM but no monoclonal antibody identified  Heavy metals including lead, arsenic and mercury all within normal limits  Cryoglobulins were negative  Copper level was normal at 146    On 4/25/2022 patient underwent an EMG per Dr. Cano, study was abnormal showing irritative changes in 3 of the 4 paraspinal areas sampled as well as in the left biceps and left vastus lateralis.  There did seem to be a small rapidly firing group of motor units in the biceps but also some wider complex polyphasics.  The findings can be seen in a primary myositis but I cannot entirely exclude the findings being related to a left C5 radiculopathy or bilateral lumbosacral radiculopathies.  There was no evidence of polyneuropathy nor evidence for a median or ulnar neuropathy on either side.  Also of note on 7/12/2021 patient had an MRI of her L-spine, the study was normal showing no significant disc disease, no compression/compromise on neural components.     Past Medical History:   Diagnosis Date   • 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HCC)     follows w/ at    • Arthritis    • Chronic sore throat     advised by PCP to have sleep study   • GERD (gastroesophageal  reflux disease)     d/t steroids   • ILD (interstitial lung disease) (HCC)     had normal PFTs, to have CT   • Kidney stones     history   • Migraine    • Myositis    • Raynaud's syndrome    • Shingles          Past Surgical History:   Procedure Laterality Date   • ADENOIDECTOMY     • D & C HYSTEROSCOPY N/A 6/10/2021    Procedure: diagnostic hysteroscopy, dilatation and curettage;  Surgeon: Pablito Vo MD;  Location: [facility];  Service: Obstetrics/Gynecology;  Laterality: N/A;   • EAR TUBES     • EYE SURGERY Right     eyelid reconstruction   • MUSCLE BIOPSY  2014    thigh right   • SKIN BIOPSY      right thigh   • VENOUS ACCESS DEVICE (PORT) INSERTION N/A 7/6/2018    Procedure: INSERTION VENOUS ACCESS DEVICE;  Surgeon: Bo Thomas MD;  Location: [facility] OR;  Service: General   • VENOUS ACCESS DEVICE (PORT) REMOVAL Right 2/7/2020    Procedure: REMOVAL VENOUS ACCESS DEVICE;  Surgeon: Bo Thomas MD;  Location: [facility];  Service: General;  Laterality: Right;  REMOVAL VENOUS ACCESS DEVICE           Current Outpatient Medications:   •  butalbital-acetaminophen-caffeine (FIORICET, ESGIC) -40 MG per tablet, Take 1 tablet by mouth Every 6 (Six) Hours As Needed for Headache., Disp: , Rfl:   •  Calcium Carbonate-Vitamin D 600-200 MG-UNIT tablet, Take 1 tablet by mouth 2 (two) times a day., Disp: , Rfl:   •  ferrous sulfate 325 (65 FE) MG tablet, Take 325 mg by mouth Daily With Breakfast., Disp: , Rfl:   •  folic acid (FOLVITE) 400 MCG tablet, Take 400 mcg by mouth Daily., Disp: , Rfl:   •  Galcanezumab-gnlm (EMGALITY SC), Inject  under the skin into the appropriate area as directed., Disp: , Rfl:   •  mycophenolate (CELLCEPT) 500 MG tablet, Take 1,000 mg by mouth Every 12 (Twelve) Hours., Disp: , Rfl:   •  riTUXimab (RITUXAN IV), Infuse 1 dose into a venous catheter Every 6 (Six) Months., Disp: , Rfl:   •  traMADol (ULTRAM) 50 MG tablet, Take 50 mg by mouth 2 (Two) Times a Day As Needed for  "Moderate Pain ., Disp: , Rfl:   •  ubrogepant (UBRELVY) 100 MG tablet, Ubrelvy 100 mg tablet  take at the onset of headache, may repeat x 1 if not relief after 2 hours, Disp: , Rfl:       Family History   Problem Relation Age of Onset   • Hyperlipidemia Mother    • Hypertension Mother    • Lung cancer Father    • Cancer Father    • Throat cancer Father    • Cervical cancer Brother    • Malig Hyperthermia Neg Hx          Social History     Socioeconomic History   • Marital status:      Spouse name: Jaquan   Tobacco Use   • Smoking status: Former Smoker     Packs/day: 0.50     Years: 15.00     Pack years: 7.50     Types: Cigarettes     Quit date: 3/13/2014     Years since quittin.1   • Smokeless tobacco: Never Used   Vaping Use   • Vaping Use: Never used   Substance and Sexual Activity   • Alcohol use: No   • Drug use: Never   • Sexual activity: Defer         Allergies   Allergen Reactions   • Sumatriptan Nausea Only and Unknown - High Severity     Chest pain  Other reaction(s): Nausea Only, Unknown  Chest pain  Chest pain             ROS:  Review of Systems   Allergic/Immunologic: Negative for environmental allergies, food allergies and immunocompromised state.   Neurological: Negative for dizziness, tremors, seizures, syncope, facial asymmetry, speech difficulty, weakness, light-headedness, numbness and headaches.   Hematological: Negative for adenopathy. Does not bruise/bleed easily.   Psychiatric/Behavioral: Negative for confusion and sleep disturbance. The patient is not nervous/anxious.      I have reviewed the above ROS put in by the medical assistant and am in agreement.     Physical Exam:  Vitals:    22 0835   BP: 110/68   Pulse: 92   SpO2: 99%   Weight: 54 kg (119 lb)   Height: 157.5 cm (62.01\")       Body mass index is 21.76 kg/m².    Physical Exam  General: Petite, slender woman, no acute distress  HEENT: Head is normocephalic, atraumatic.  Neck: Neck is supple with no masses.  Negative " Lhermitte need  Heart: Regular rate and rhythm.  Extremities: Distal pulses are palpable and equal.  Pedal pulses are perhaps a little bit diminished.  Skin of fingers and toes does appear pale, however there are no ulcerations or suspicious lesions    Neurological Exam:   Mental Status: Awake, alert, oriented to person, place and time.  Conversant without evidence of an affective disorder, thought disorder, delusions or hallucinations.  Attention span and concentration are normal.  HCF: No aphasia, apraxia or dysarthria.  Recent and remote memory intact.  Knowledge of recent events intact.  CN: I:   II: Visual fields full without left inattention   III, IV, VI: Eye movements intact without nystagmus or ptosis.  Pupils equal round and reactive to light.   V,VII: Light touch and pinprick intact all 3 divisions of V.  Facial muscles symmetrical.   VIII: Hearing intact to finger rub   IX,X: Soft palate elevates symmetrically   XI: Sternomastoid and trapezius are strong.   XII: Tongue midline without atrophy or fasciculations  Motor: Normal tone and bulk in the upper and lower extremities   Power testing: Patient with very normal strength in all muscles tested including distal hands and feet  Reflexes: Upper extremities: +2 diffusely        Lower extremities: +2 diffusely        Toe signs: Downgoing bilaterally  Sensory: Light touch: Diffusely intact in upper and lower extremities        Pinprick: Diffusely intact in upper and lower extremities  Cerebellar: Finger-to-nose: Intact           Rapid movement: Intact           Heel-to-shin: Intact  Gait and Station: Patient comes to stand easily and without difficulty.  Her casual walk is within normal.  She is able to walk on her toes and heels.  Tandem walk is within normal limits.  Negative Romberg, negative drift    Results:      Lab Results   Component Value Date    GLUCOSE 88 06/01/2021    BUN 7 02/15/2022    CREATININE 0.48 (L) 02/15/2022    EGFRIFNONA >60 02/15/2022     EGFRIFAFRI >60 02/15/2022    BCR 15 02/15/2022    CO2 24 02/15/2022    CALCIUM 8.4 (L) 02/15/2022    PROTENTOTREF 5.7 (L) 01/19/2022    ALBUMIN 3.9 02/15/2022    LABIL2 1.2 01/19/2022    AST 42 (H) 02/15/2022    ALT 60 (H) 02/15/2022       Lab Results   Component Value Date    WBC 6.44 02/15/2022    HGB 10.9 (L) 02/15/2022    HCT 34.3 02/15/2022    MCV 82 02/15/2022     (L) 02/15/2022         .No results found for: RPR      Lab Results   Component Value Date    TSH 3.50 07/14/2014    S5WQXYV 6.31 07/14/2014         Lab Results   Component Value Date    WWPMNEKY27 487 05/07/2020         No results found for: FOLATE      Lab Results   Component Value Date    HGBA1C 5.10 01/19/2022         Lab Results   Component Value Date    GLUCOSE 88 06/01/2021    BUN 7 02/15/2022    CREATININE 0.48 (L) 02/15/2022    EGFRIFNONA >60 02/15/2022    EGFRIFAFRI >60 02/15/2022    BCR 15 02/15/2022    K 3.7 02/15/2022    CO2 24 02/15/2022    CALCIUM 8.4 (L) 02/15/2022    PROTENTOTREF 5.7 (L) 01/19/2022    ALBUMIN 3.9 02/15/2022    LABIL2 1.2 01/19/2022    AST 42 (H) 02/15/2022    ALT 60 (H) 02/15/2022         Lab Results   Component Value Date    WBC 6.44 02/15/2022    HGB 10.9 (L) 02/15/2022    HCT 34.3 02/15/2022    MCV 82 02/15/2022     (L) 02/15/2022       EMG and Nerve Conduction Studies     Technical summary:  Nerve conduction studies were obtained in both arms and in the left leg with 1 comparison on the right leg.  Skin temperatures were very cold in the feet and it was difficult to keep the feet warm.  Temperature correction was not needed however.  Temperatures on the palms were at least 32 °C after warming the hands.  Needle examination was obtained on selected muscles in all 4 extremities.     Results:  1.  Normal median antidromic sensory distal latencies and amplitudes bilaterally.  2.  Normal ulnar antidromic sensory distal latencies and amplitudes bilaterally.  3.  Normal median motor conduction  velocities, distal latencies and amplitudes.  4.  Normal ulnar motor conduction velocities, distal latencies and amplitudes bilaterally.  5.  Normal left sural sensory distal latency and amplitude.  6.  Normal superficial peroneal sensory distal latencies and amplitudes bilaterally.  7.  Normal left peroneal motor conduction velocities, distal latency and amplitudes.  8.  Normal left tibial motor conduction velocity, distal latency and amplitudes.  9.  Needle examination of selected muscles in all 4 extremities showed positive sharp waves in the left vastus lateralis and left biceps.  In the biceps there were some wide polyphasics but also a population of small rapidly firing motor units.  Overall recruitment was diminished.  All other muscles tested showed normal insertional activities with normal motor units and recruitment patterns.  Cervical paraspinals at C5 showed a few positive sharp waves on the left but none on the right.  In the lumbar spine at L4 there were positive sharp waves bilaterally.     Impression:  Abnormal study showing irritative changes in 3 of the 4 paraspinal areas sampled as well as in the left biceps and left vastus lateralis.  There did seem to be a small rapidly firing group of motor units in the biceps but also some wider complex polyphasics.  The findings can be seen in a primary myositis but I cannot entirely exclude the findings being related to a left C5 radiculopathy or bilateral lumbosacral radiculopathies.  There was no evidence of polyneuropathy nor evidence for a median or ulnar neuropathy on either side.  Clinical correlation suggested.  Study results were discussed with the patient.       Assessment:   1.  Paresthesias/dysesthsias in upper and lower extremities  2.  Antisynthetase syndrome       Plan:  1.  We discussed patient's work-up to date, her EMG is consistent with an underlying myositis picture.  Does not really indicate any evidence of a polyneuropathy.  Also  laboratory work-up for treatable causes of neuropathy did not reveal any potential etiologies.  We did discuss that small fiber neuropathy is typically undetected on EMG and associated with several autoimmune conditions though I do not know of an association with Antisynthetase syndrome.   -I think it is possible that her symptoms are from the inflammatory myopathy and also that she likely has some Raynaud's phenomenon which is at play as well.  Nevertheless we will go ahead and plan on skin biopsy for epidermal nerve fiber density testing just in case there is an element of neuropathy here as well.    Will follow-up at the time of the procedure in a few weeks    I spent 30 minutes face-to-face with patient/family today, 20 minutes of that time was counseling patient on disease course and plan of care and answering any questions that they had.                     Dictated utilizing Dragon dictation.

## 2022-05-23 ENCOUNTER — PROCEDURE VISIT (OUTPATIENT)
Dept: NEUROLOGY | Facility: CLINIC | Age: 32
End: 2022-05-23

## 2022-05-23 DIAGNOSIS — R20.2 PARESTHESIAS: Primary | ICD-10-CM

## 2022-05-23 PROCEDURE — 11104 PUNCH BX SKIN SINGLE LESION: CPT | Performed by: PHYSICIAN ASSISTANT

## 2022-05-23 PROCEDURE — 11105 PUNCH BX SKIN EA SEP/ADDL: CPT | Performed by: PHYSICIAN ASSISTANT

## 2022-05-23 NOTE — PATIENT INSTRUCTIONS
Skin biopsy wound care:    - Leave the original dressing in place for 24 hours  - Clean the wound once a day Allow water, shampoo and soap from the shower to wash over the wound.  - Cover the wound with Aquaphor or Vaseline and then apply a bandage for 1-2 weeks. Change the bandage daily.  - We recommend that you apply Aquaphor or Vaseline to a Band-Aid or Telfa pad (non-stick gauze) with tape. If you prefer not to use a band-aid, apply a thick coat of Vaseline or Aquaphor twice daily. Keep the wound covered with Aquaphor or Vaseline at all times for 2 weeks.    ADDITIONAL INFORMATION:  - Your biopsy site should not be particularly painful. You can apply ice to the area or take Tylenol for discomfort. Don’t take Motrin, Ibuprofen, Aspirin or any other blood thinning medications.  - A significant increase in pain may indicate a problem. Call the office if this occurs.    REPORT ANY OF THESE SYMPTOMS TO YOUR PHYSICIAN:  - Increase in redness more than ¼ inch on each side of the biopsy site.  - Swelling  - Increasing or severe pain  - Drainage of pus  - Fever over 100.5 degrees  - If bleeding occurs which you cannot stop with firm pressure for 20 minutes    WHO TO CALL  - [facility]rology Office at [phone] for problems, Monday through Friday from 8am to 4pm. Ask for your physician and inform the staff if you have a problem with your biopsy site.  - If you have not received a report of your skin biopsy either by mail or by phone within 2 weeks after your biopsy, please call our office.

## 2022-05-23 NOTE — PROGRESS NOTES
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  She has some neck pain and low back pain but not any radicular pain.  Recent EMG showed some changes consistent with a myositis. Also laboratory work-up for treatable causes of neuropathy did not reveal any potential etiologies    Biopsy site:      Thigh (Left)  Lateral thigh 20 cm below the iliac spine    Distal Leg (Left)  10 cm above the lateral malleolus     After obtaining informed consent, the area was prepped and draped in the usual fashion.     Anesthesia was obtained with 2% lidocaine with epinephrine.     A full thickness punch biopsy was obtained at each site with a 3mm punch. Gauze and bandage were applied and hemostasis was assured. The patient tolerated the procedure well.    Wound care discussed.     2 Specimen(s) were placed in each vial of fixative and packaged appropriately to be sent for processing at [facility]

## 2022-08-24 ENCOUNTER — OFFICE VISIT (OUTPATIENT)
Dept: OBSTETRICS AND GYNECOLOGY | Facility: CLINIC | Age: 32
End: 2022-08-24

## 2022-08-24 VITALS
WEIGHT: 117.8 LBS | BODY MASS INDEX: 21.68 KG/M2 | HEIGHT: 62 IN | SYSTOLIC BLOOD PRESSURE: 106 MMHG | DIASTOLIC BLOOD PRESSURE: 80 MMHG

## 2022-08-24 DIAGNOSIS — Z01.419 PAP SMEAR, LOW-RISK: ICD-10-CM

## 2022-08-24 DIAGNOSIS — Z01.419 WELL WOMAN EXAM: ICD-10-CM

## 2022-08-24 DIAGNOSIS — Z78.9: ICD-10-CM

## 2022-08-24 DIAGNOSIS — Z01.419 ROUTINE GYNECOLOGICAL EXAMINATION: Primary | ICD-10-CM

## 2022-08-24 DIAGNOSIS — Z11.51 ENCOUNTER FOR SCREENING FOR HUMAN PAPILLOMAVIRUS (HPV): ICD-10-CM

## 2022-08-24 PROBLEM — N92.0 MENORRHAGIA WITH REGULAR CYCLE: Status: RESOLVED | Noted: 2021-03-26 | Resolved: 2022-08-24

## 2022-08-24 PROBLEM — N94.6 DYSMENORRHEA: Status: RESOLVED | Noted: 2021-05-25 | Resolved: 2022-08-24

## 2022-08-24 PROBLEM — Z30.2 REQUEST FOR STERILIZATION: Status: RESOLVED | Noted: 2021-05-25 | Resolved: 2022-08-24

## 2022-08-24 LAB
BILIRUB BLD-MCNC: NEGATIVE MG/DL
CLARITY, POC: CLEAR
COLOR UR: YELLOW
GLUCOSE UR STRIP-MCNC: NEGATIVE MG/DL
KETONES UR QL: NEGATIVE
LEUKOCYTE EST, POC: NEGATIVE
NITRITE UR-MCNC: NEGATIVE MG/ML
PH UR: 5 [PH] (ref 5–8)
PROT UR STRIP-MCNC: NEGATIVE MG/DL
RBC # UR STRIP: NEGATIVE /UL
SP GR UR: 1 (ref 1–1.03)
UROBILINOGEN UR QL: NORMAL

## 2022-08-24 PROCEDURE — 99395 PREV VISIT EST AGE 18-39: CPT | Performed by: OBSTETRICS & GYNECOLOGY

## 2022-08-24 PROCEDURE — 2014F MENTAL STATUS ASSESS: CPT | Performed by: OBSTETRICS & GYNECOLOGY

## 2022-08-24 PROCEDURE — 3008F BODY MASS INDEX DOCD: CPT | Performed by: OBSTETRICS & GYNECOLOGY

## 2022-08-24 RX ORDER — GALCANEZUMAB 120 MG/ML
INJECTION, SOLUTION SUBCUTANEOUS
COMMUNITY
Start: 2022-07-25 | End: 2022-12-01 | Stop reason: HOSPADM

## 2022-08-24 NOTE — PROGRESS NOTES
GYN Annual Exam     CC- Here for annual exam   Chief Complaint   Patient presents with   • Annual Exam       Pt new to practice? No  Pt new to me? No     Rachel Srinivasan is a 32 y.o.  female who presents for annual well woman exam. No LMP recorded.    Problems in addition to need for annual: none.  Pt not on the pill anymore.  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[facility])     follows w/Dr at    • Arthritis    • Chronic sore throat     advised by PCP to have sleep study   • GERD (gastroesophageal reflux disease)     d/t steroids   • ILD (interstitial lung disease) ([facility])     had normal PFTs, to have CT   • Kidney stones     history   • Migraine    • Myositis    • Raynaud's syndrome    • Shingles        Past Surgical History:   Procedure Laterality Date   • ADENOIDECTOMY     • D & C HYSTEROSCOPY N/A 6/10/2021    Procedure: diagnostic hysteroscopy, dilatation and curettage;  Surgeon: Pablito Vo MD;  Location: [facility];  Service: Obstetrics/Gynecology;  Laterality: N/A;   • EAR TUBES     • EYE SURGERY Right     eyelid reconstruction   • MUSCLE BIOPSY  2014    thigh right   • SKIN BIOPSY      right thigh   • VENOUS ACCESS DEVICE (PORT) INSERTION N/A 2018    Procedure: INSERTION VENOUS ACCESS DEVICE;  Surgeon: Bo Thomas MD;  Location: [facility];  Service: General   • VENOUS ACCESS DEVICE (PORT) REMOVAL Right 2020    Procedure: REMOVAL VENOUS ACCESS  "DEVICE;  Surgeon: Bo Thomas MD;  Location: [facility];  Service: General;  Laterality: Right;  REMOVAL VENOUS ACCESS DEVICE         Current Outpatient Medications:   •  butalbital-acetaminophen-caffeine (FIORICET, ESGIC) -40 MG per tablet, Take 1 tablet by mouth Every 6 (Six) Hours As Needed for Headache., Disp: , Rfl:   •  Emgality 120 MG/ML auto-injector pen, INJECT 1 ML EVERY MONTH BY SUBCUTANEOUS ROUTE., Disp: , Rfl:   •  ferrous sulfate 325 (65 FE) MG tablet, Take 325 mg by mouth Daily With Breakfast., Disp: , Rfl:   •  Galcanezumab-gnlm (EMGALITY SC), Inject  under the skin into the appropriate area as directed., Disp: , Rfl:   •  riTUXimab (RITUXAN IV), Infuse 1 dose into a venous catheter Every 6 (Six) Months., Disp: , Rfl:     Allergies   Allergen Reactions   • Sumatriptan Nausea Only and Unknown - High Severity     Chest pain  Other reaction(s): Nausea Only, Unknown  Chest pain  Chest pain         Social History     Tobacco Use   • Smoking status: Former Smoker     Packs/day: 0.50     Years: 15.00     Pack years: 7.50     Types: Cigarettes     Quit date: 3/13/2014     Years since quittin.4   • Smokeless tobacco: Never Used   Vaping Use   • Vaping Use: Never used   Substance Use Topics   • Alcohol use: No   • Drug use: Never       Rachel Srinivasan  reports that she quit smoking about 8 years ago. Her smoking use included cigarettes. She has a 7.50 pack-year smoking history. She has never used smokeless tobacco..           Family History   Problem Relation Age of Onset   • Hyperlipidemia Mother    • Hypertension Mother    • Lung cancer Father    • Cancer Father    • Throat cancer Father    • Cervical cancer Brother    • Malig Hyperthermia Neg Hx        Review of Systems    Patient reports that she is not currently experiencing any symptoms of urinary incontinence.      noTESTED FOR CHLAMYDIA?    EXAM:  /80   Ht 157.5 cm (62.01\")   Wt 53.4 kg (117 lb 12.8 oz)   Breastfeeding No   BMI " 21.54 kg/m²     Labs:   Lab Results (last 24 hours)     Procedure Component Value Units Date/Time    POC Urinalysis Dipstick [[id number]]  (Normal) Collected: 08/24/22 0910    Specimen: Urine Updated: 08/24/22 0911     Color Yellow     Clarity, UA Clear     Glucose, UA Negative mg/dL      Bilirubin Negative     Ketones, UA Negative     Specific Gravity  1.005     Blood, UA Negative     pH, Urine 5.0     Protein, POC Negative mg/dL      Urobilinogen, UA Normal     Leukocytes Negative     Nitrite, UA Negative          Physical Exam  Vitals and nursing note reviewed. Exam conducted with a chaperone present.   Constitutional:       General: She is not in acute distress.     Appearance: She is well-developed. She is not diaphoretic.   HENT:      Head: Normocephalic and atraumatic.      Nose: Nose normal.   Eyes:      Extraocular Movements: Extraocular movements intact.   Cardiovascular:      Rate and Rhythm: Normal rate.   Pulmonary:      Effort: Pulmonary effort is normal.   Chest:   Breasts: Breasts are symmetrical.      Right: Normal. No mass, nipple discharge, skin change, tenderness or axillary adenopathy.      Left: Normal. No mass, nipple discharge, skin change, tenderness or axillary adenopathy.       Abdominal:      General: There is no distension.      Palpations: Abdomen is soft. There is no mass.      Tenderness: There is no abdominal tenderness. There is no guarding.   Genitourinary:     General: Normal vulva.      Pubic Area: No rash.       Vagina: Normal. No vaginal discharge.      Cervix: Normal.      Uterus: Normal.       Adnexa: Right adnexa normal and left adnexa normal.   Musculoskeletal:         General: No tenderness or deformity. Normal range of motion.      Cervical back: Normal range of motion.   Lymphadenopathy:      Upper Body:      Right upper body: No axillary adenopathy.      Left upper body: No axillary adenopathy.   Skin:     General: Skin is warm and dry.      Coloration: Skin is not pale.       Findings: No erythema or rash.   Neurological:      Mental Status: She is alert and oriented to person, place, and time.   Psychiatric:         Behavior: Behavior normal.         Thought Content: Thought content normal.         Judgment: Judgment normal.            As part of wellness and prevention, the following topics were discussed with the patient: healthy weight, substance abuse/misuse, mental health, encouraging self breast exam, and other counseling and guidance done:  Nutrition, physical activity, healthy weight, injury prevention, misuse of tobacco, alcohol and drugs, sexual behavior and STDs, contraception, dental health, mental health, immunizations breast cancer screening and exams.    I saw the patient with a face mask, gloves and eye protection  The patient herself was masked.  Social distancing was observed as appropriate. All COVID precautions observed.  Pt encouraged to receive COVID vaccine if she hadn't already done this.     Assessment     1) GYN annual well woman exam.   2) PAP done today? Yes  3) problems addressed: none        Fhx breast cancer? No    Fhx ovarian cancer? No    Fhx colon cancer? No    Invitae testing offered? Yes           Plan       Follow up prn or one year.    Pt instructed to call for results of any testing done today and that failure to call if she has not heard from us could result in inadequate treatment.  Pt verbalized her understanding.     Diagnoses and all orders for this visit:    1. Routine gynecological examination (Primary)  -     POC Urinalysis Dipstick    2. Pap smear, low-risk  -     IgP, Aptima HPV    3. Encounter for screening for human papillomavirus (HPV)  -     IgP, Aptima HPV    4. Rhythm method of contraception    5. Well woman exam        RTO Return in about 1 year (around 8/24/2023) for Annual physical.          Pablito Vo MD  [unfilled]  09:27 EDT

## 2022-08-30 LAB
CYTOLOGIST CVX/VAG CYTO: NORMAL
CYTOLOGY CVX/VAG DOC CYTO: NORMAL
CYTOLOGY CVX/VAG DOC THIN PREP: NORMAL
DX ICD CODE: NORMAL
HIV 1 & 2 AB SER-IMP: NORMAL
HPV I/H RISK 4 DNA CVX QL PROBE+SIG AMP: NEGATIVE
Lab: NORMAL
OTHER STN SPEC: NORMAL
STAT OF ADQ CVX/VAG CYTO-IMP: NORMAL

## 2022-09-28 ENCOUNTER — TRANSCRIBE ORDERS (OUTPATIENT)
Dept: BONE DENSITY | Facility: HOSPITAL | Age: 32
End: 2022-09-28

## 2022-09-28 DIAGNOSIS — Z13.820 ENCOUNTER FOR SCREENING FOR OSTEOPOROSIS: Primary | ICD-10-CM

## 2022-10-12 ENCOUNTER — APPOINTMENT (OUTPATIENT)
Dept: BONE DENSITY | Facility: HOSPITAL | Age: 32
End: 2022-10-12

## 2022-10-12 DIAGNOSIS — Z13.820 ENCOUNTER FOR SCREENING FOR OSTEOPOROSIS: ICD-10-CM

## 2022-10-12 PROCEDURE — 77080 DXA BONE DENSITY AXIAL: CPT

## 2022-10-31 ENCOUNTER — OFFICE VISIT (OUTPATIENT)
Dept: SURGERY | Facility: CLINIC | Age: 32
End: 2022-10-31

## 2022-10-31 VITALS
HEART RATE: 92 BPM | DIASTOLIC BLOOD PRESSURE: 72 MMHG | HEIGHT: 61 IN | OXYGEN SATURATION: 99 % | BODY MASS INDEX: 22.66 KG/M2 | SYSTOLIC BLOOD PRESSURE: 118 MMHG | WEIGHT: 120 LBS

## 2022-10-31 DIAGNOSIS — R19.4 CHANGE IN BOWEL HABITS: ICD-10-CM

## 2022-10-31 DIAGNOSIS — D64.9 ANEMIA, UNSPECIFIED TYPE: Primary | ICD-10-CM

## 2022-10-31 PROBLEM — Z95.828 PORT-A-CATH IN PLACE: Status: RESOLVED | Noted: 2019-12-17 | Resolved: 2022-10-31

## 2022-10-31 PROBLEM — I87.8 POOR VENOUS ACCESS: Status: RESOLVED | Noted: 2018-06-27 | Resolved: 2022-10-31

## 2022-10-31 PROCEDURE — 99213 OFFICE O/P EST LOW 20 MIN: CPT | Performed by: SURGERY

## 2022-10-31 NOTE — PROGRESS NOTES
Rachel Srinivasan 32 y.o. female presents @ the req of NURY Carvajal for eval of anemia.    Chief Complaint   Patient presents with   • Anemia             HPI   Above noted and agree.  This very pleasant 32-year-old female is here to discuss endoscopy.  She was diagnosed with anemia couple of years ago.  She has an autoimmune disease and was on long-term steroids but has been able to wean herself off of those.  She was also getting IVIG infusions but those have also been stopped.  She tolerates a regular diet without nausea or vomiting.  She did notice change in her bowel habits.  She used to have constipation but now she has diarrhea when she moves her bowels.  She has no chest pain or shortness of breath.  She has no fevers or chills.  She has no other complaints.  She has never had endoscopy.      Review of Systems   All other systems reviewed and are negative.            Current Outpatient Medications:   •  butalbital-acetaminophen-caffeine (FIORICET, ESGIC) -40 MG per tablet, Take 1 tablet by mouth Every 6 (Six) Hours As Needed for Headache., Disp: , Rfl:   •  Emgality 120 MG/ML auto-injector pen, INJECT 1 ML EVERY MONTH BY SUBCUTANEOUS ROUTE., Disp: , Rfl:   •  ferrous sulfate 325 (65 FE) MG tablet, Take 325 mg by mouth Daily With Breakfast., Disp: , Rfl:   •  Galcanezumab-gnlm (EMGALITY SC), Inject  under the skin into the appropriate area as directed., Disp: , Rfl:   •  meloxicam (MOBIC) 15 MG tablet, Take 1 tablet by mouth Daily., Disp: , Rfl:   •  mycophenolate (CELLCEPT) 500 MG tablet, Take  by mouth 2 (Two) Times a Day., Disp: , Rfl:   •  pseudoephedrine (SUDAFED) 30 MG tablet, Take 1 tablet by mouth Every 4 (Four) Hours As Needed for Congestion., Disp: , Rfl:   •  riTUXimab (RITUXAN IV), Infuse 1 dose into a venous catheter Every 6 (Six) Months., Disp: , Rfl:         Allergies   Allergen Reactions   • Sumatriptan Nausea Only and Unknown - High Severity     Chest pain  Other reaction(s): Nausea  Only, Unknown  Chest pain  Chest pain             Past Medical History:   Diagnosis Date   • 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[facility])     follows w/Dr at    • Arthritis    • Chronic sore throat     advised by PCP to have sleep study   • GERD (gastroesophageal reflux disease)     d/t steroids   • ILD (interstitial lung disease) ([facility])     had normal PFTs, to have CT   • Kidney stones     history   • Migraine    • Myositis    • Raynaud's syndrome    • Shingles            Past Surgical History:   Procedure Laterality Date   • ADENOIDECTOMY     • D & C HYSTEROSCOPY N/A 6/10/2021    Procedure: diagnostic hysteroscopy, dilatation and curettage;  Surgeon: Pablito Vo MD;  Location: [facility];  Service: Obstetrics/Gynecology;  Laterality: N/A;   • EAR TUBES     • EYE SURGERY Right     eyelid reconstruction   • MUSCLE BIOPSY      thigh right   • SKIN BIOPSY      right thigh   • VENOUS ACCESS DEVICE (PORT) INSERTION N/A 2018    Procedure: INSERTION VENOUS ACCESS DEVICE;  Surgeon: Bo Thomas MD;  Location: [facility];  Service: General   • VENOUS ACCESS DEVICE (PORT) REMOVAL Right 2020    Procedure: REMOVAL VENOUS ACCESS DEVICE;  Surgeon: Bo Thomas MD;  Location: [facility];  Service: General;  Laterality: Right;  REMOVAL VENOUS ACCESS DEVICE           Social History     Tobacco Use   • Smoking status: Former     Packs/day: 0.50     Years: 15.00     Pack years: 7.50     Types: Cigarettes     Quit date: 3/13/2014     Years since quittin.6   • Smokeless tobacco: Never   Vaping Use   • Vaping Use: Never used   Substance Use Topics   • Alcohol use: No   • Drug use: Never           Immunization History   Administered Date(s) Administered   • COVID-19 (PFIZER) PURPLE CAP 2021, 2021   • Pneumococcal, Unspecified 2014           Physical Exam  Vitals and nursing note reviewed.   Constitutional:       Appearance: Normal appearance.   HENT:      Head: Normocephalic and atraumatic.  "  Cardiovascular:      Rate and Rhythm: Normal rate and regular rhythm.   Pulmonary:      Effort: Pulmonary effort is normal.      Breath sounds: Normal breath sounds.   Abdominal:      General: Bowel sounds are normal.      Palpations: Abdomen is soft.   Musculoskeletal:         General: No swelling or tenderness.   Skin:     General: Skin is warm and dry.   Neurological:      General: No focal deficit present.      Mental Status: She is alert and oriented to person, place, and time.   Psychiatric:         Mood and Affect: Mood normal.         Behavior: Behavior normal.         Debilities/Disabilities Identified: None    Emotional Behavior: Appropriate      /72   Pulse 92   Ht 154.9 cm (61\")   Wt 54.4 kg (120 lb)   SpO2 99%   BMI 22.67 kg/m²         Diagnoses and all orders for this visit:    1. Anemia, unspecified type (Primary)    2. Change in bowel habits    I discussed with the patient the benefits and risks of performing endoscopy.  Benefits and risks were not limited to but including bleeding, infection, perforation, complications of anesthesia, aspiration.  The patient appeared to understand and is willing to proceed.    Thank you for allowing me to participate in the care of this interesting patient.            "

## 2022-10-31 NOTE — H&P
Rachel Srinivasan 32 y.o. female presents @ the req of NURY Carvajal for eval of anemia.    Chief Complaint   Patient presents with   • Anemia             HPI   Above noted and agree.  This very pleasant 32-year-old female is here to discuss endoscopy.  She was diagnosed with anemia couple of years ago.  She has an autoimmune disease and was on long-term steroids but has been able to wean herself off of those.  She was also getting IVIG infusions but those have also been stopped.  She tolerates a regular diet without nausea or vomiting.  She did notice change in her bowel habits.  She used to have constipation but now she has diarrhea when she moves her bowels.  She has no chest pain or shortness of breath.  She has no fevers or chills.  She has no other complaints.  She has never had endoscopy.      Review of Systems   All other systems reviewed and are negative.            Current Outpatient Medications:   •  butalbital-acetaminophen-caffeine (FIORICET, ESGIC) -40 MG per tablet, Take 1 tablet by mouth Every 6 (Six) Hours As Needed for Headache., Disp: , Rfl:   •  Emgality 120 MG/ML auto-injector pen, INJECT 1 ML EVERY MONTH BY SUBCUTANEOUS ROUTE., Disp: , Rfl:   •  ferrous sulfate 325 (65 FE) MG tablet, Take 325 mg by mouth Daily With Breakfast., Disp: , Rfl:   •  Galcanezumab-gnlm (EMGALITY SC), Inject  under the skin into the appropriate area as directed., Disp: , Rfl:   •  meloxicam (MOBIC) 15 MG tablet, Take 1 tablet by mouth Daily., Disp: , Rfl:   •  mycophenolate (CELLCEPT) 500 MG tablet, Take  by mouth 2 (Two) Times a Day., Disp: , Rfl:   •  pseudoephedrine (SUDAFED) 30 MG tablet, Take 1 tablet by mouth Every 4 (Four) Hours As Needed for Congestion., Disp: , Rfl:   •  riTUXimab (RITUXAN IV), Infuse 1 dose into a venous catheter Every 6 (Six) Months., Disp: , Rfl:         Allergies   Allergen Reactions   • Sumatriptan Nausea Only and Unknown - High Severity     Chest pain  Other reaction(s): Nausea  Only, Unknown  Chest pain  Chest pain             Past Medical History:   Diagnosis Date   • 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[facility])     follows w/Dr at    • Arthritis    • Chronic sore throat     advised by PCP to have sleep study   • GERD (gastroesophageal reflux disease)     d/t steroids   • ILD (interstitial lung disease) ([facility])     had normal PFTs, to have CT   • Kidney stones     history   • Migraine    • Myositis    • Raynaud's syndrome    • Shingles            Past Surgical History:   Procedure Laterality Date   • ADENOIDECTOMY     • D & C HYSTEROSCOPY N/A 6/10/2021    Procedure: diagnostic hysteroscopy, dilatation and curettage;  Surgeon: Pablito Vo MD;  Location: [facility];  Service: Obstetrics/Gynecology;  Laterality: N/A;   • EAR TUBES     • EYE SURGERY Right     eyelid reconstruction   • MUSCLE BIOPSY      thigh right   • SKIN BIOPSY      right thigh   • VENOUS ACCESS DEVICE (PORT) INSERTION N/A 2018    Procedure: INSERTION VENOUS ACCESS DEVICE;  Surgeon: Bo Thomas MD;  Location: [facility] OR;  Service: General   • VENOUS ACCESS DEVICE (PORT) REMOVAL Right 2020    Procedure: REMOVAL VENOUS ACCESS DEVICE;  Surgeon: Bo Thomas MD;  Location: [facility] OR;  Service: General;  Laterality: Right;  REMOVAL VENOUS ACCESS DEVICE           Social History     Tobacco Use   • Smoking status: Former     Packs/day: 0.50     Years: 15.00     Pack years: 7.50     Types: Cigarettes     Quit date: 3/13/2014     Years since quittin.6   • Smokeless tobacco: Never   Vaping Use   • Vaping Use: Never used   Substance Use Topics   • Alcohol use: No   • Drug use: Never           Immunization History   Administered Date(s) Administered   • COVID-19 (PFIZER) PURPLE CAP 2021, 2021   • Pneumococcal, Unspecified 2014           Physical Exam  Vitals and nursing note reviewed.   Constitutional:       Appearance: Normal appearance.   HENT:      Head: Normocephalic and atraumatic.  "  Cardiovascular:      Rate and Rhythm: Normal rate and regular rhythm.   Pulmonary:      Effort: Pulmonary effort is normal.      Breath sounds: Normal breath sounds.   Abdominal:      General: Bowel sounds are normal.      Palpations: Abdomen is soft.   Musculoskeletal:         General: No swelling or tenderness.   Skin:     General: Skin is warm and dry.   Neurological:      General: No focal deficit present.      Mental Status: She is alert and oriented to person, place, and time.   Psychiatric:         Mood and Affect: Mood normal.         Behavior: Behavior normal.         Debilities/Disabilities Identified: None    Emotional Behavior: Appropriate      /72   Pulse 92   Ht 154.9 cm (61\")   Wt 54.4 kg (120 lb)   SpO2 99%   BMI 22.67 kg/m²         Diagnoses and all orders for this visit:    1. Anemia, unspecified type (Primary)    2. Change in bowel habits    I discussed with the patient the benefits and risks of performing endoscopy.  Benefits and risks were not limited to but including bleeding, infection, perforation, complications of anesthesia, aspiration.  The patient appeared to understand and is willing to proceed.    Thank you for allowing me to participate in the care of this interesting patient.                "

## 2022-11-04 ENCOUNTER — PATIENT ROUNDING (BHMG ONLY) (OUTPATIENT)
Dept: SURGERY | Facility: CLINIC | Age: 32
End: 2022-11-04

## 2022-11-04 NOTE — PROGRESS NOTES
November 4, 2022    Hello, may I speak with Rachel Srinivasan?    My name is Nilda Kumari      I am  with [facility] SURG[facility] GENERAL SURGERY  [address] [place] [address] [phone].    Before we get started may I verify your date of birth? 1990    I am calling to officially welcome you to our practice and ask about your recent visit. Is this a good time to talk? yes    Tell me about your visit with us. What things went well?  She was very happy. Zero complaints.       We're always looking for ways to make our patients' experiences even better. Do you have recommendations on ways we may improve?  no    Overall were you satisfied with your first visit to our practice? yes       I appreciate you taking the time to speak with me today. Is there anything else I can do for you? no      Thank you, and have a great day.

## 2022-11-30 ENCOUNTER — ANESTHESIA EVENT (OUTPATIENT)
Dept: PERIOP | Facility: HOSPITAL | Age: 32
End: 2022-11-30

## 2022-12-01 ENCOUNTER — ANESTHESIA (OUTPATIENT)
Dept: PERIOP | Facility: HOSPITAL | Age: 32
End: 2022-12-01

## 2022-12-01 ENCOUNTER — HOSPITAL ENCOUNTER (OUTPATIENT)
Facility: HOSPITAL | Age: 32
Setting detail: HOSPITAL OUTPATIENT SURGERY
Discharge: HOME | End: 2022-12-01
Attending: SURGERY | Admitting: SURGERY

## 2022-12-01 VITALS
HEART RATE: 90 BPM | BODY MASS INDEX: 19.99 KG/M2 | OXYGEN SATURATION: 100 % | DIASTOLIC BLOOD PRESSURE: 87 MMHG | SYSTOLIC BLOOD PRESSURE: 103 MMHG | RESPIRATION RATE: 16 BRPM | TEMPERATURE: 97.6 F | WEIGHT: 105.8 LBS

## 2022-12-01 DIAGNOSIS — D64.9 ANEMIA, UNSPECIFIED TYPE: ICD-10-CM

## 2022-12-01 DIAGNOSIS — R19.4 CHANGE IN BOWEL HABITS: ICD-10-CM

## 2022-12-01 PROCEDURE — 87081 CULTURE SCREEN ONLY: CPT | Performed by: SURGERY

## 2022-12-01 PROCEDURE — 88305 TISSUE EXAM BY PATHOLOGIST: CPT | Performed by: SURGERY

## 2022-12-01 PROCEDURE — 25010000002 PROPOFOL 200 MG/20ML EMULSION: Performed by: ANESTHESIOLOGY

## 2022-12-01 PROCEDURE — 45380 COLONOSCOPY AND BIOPSY: CPT | Performed by: SURGERY

## 2022-12-01 PROCEDURE — 43239 EGD BIOPSY SINGLE/MULTIPLE: CPT | Performed by: SURGERY

## 2022-12-01 RX ORDER — OMEPRAZOLE 40 MG/1
40 CAPSULE, DELAYED RELEASE ORAL DAILY
Qty: 30 CAPSULE | Refills: 6 | Status: SHIPPED | OUTPATIENT
Start: 2022-12-01

## 2022-12-01 RX ORDER — PROPOFOL 10 MG/ML
INJECTION, EMULSION INTRAVENOUS AS NEEDED
Status: DISCONTINUED | OUTPATIENT
Start: 2022-12-01 | End: 2022-12-01 | Stop reason: SURG

## 2022-12-01 RX ORDER — ONDANSETRON 2 MG/ML
4 INJECTION INTRAMUSCULAR; INTRAVENOUS ONCE AS NEEDED
Status: DISCONTINUED | OUTPATIENT
Start: 2022-12-01 | End: 2022-12-01 | Stop reason: HOSPADM

## 2022-12-01 RX ORDER — SODIUM CHLORIDE 0.9 % (FLUSH) 0.9 %
10 SYRINGE (ML) INJECTION AS NEEDED
Status: DISCONTINUED | OUTPATIENT
Start: 2022-12-01 | End: 2022-12-01 | Stop reason: HOSPADM

## 2022-12-01 RX ORDER — SODIUM CHLORIDE, SODIUM LACTATE, POTASSIUM CHLORIDE, CALCIUM CHLORIDE 600; 310; 30; 20 MG/100ML; MG/100ML; MG/100ML; MG/100ML
100 INJECTION, SOLUTION INTRAVENOUS CONTINUOUS
Status: DISCONTINUED | OUTPATIENT
Start: 2022-12-01 | End: 2022-12-01 | Stop reason: HOSPADM

## 2022-12-01 RX ORDER — LIDOCAINE HYDROCHLORIDE 10 MG/ML
0.5 INJECTION, SOLUTION EPIDURAL; INFILTRATION; INTRACAUDAL; PERINEURAL ONCE AS NEEDED
Status: DISCONTINUED | OUTPATIENT
Start: 2022-12-01 | End: 2022-12-01 | Stop reason: HOSPADM

## 2022-12-01 RX ORDER — LIDOCAINE HYDROCHLORIDE 10 MG/ML
INJECTION, SOLUTION INFILTRATION; PERINEURAL AS NEEDED
Status: DISCONTINUED | OUTPATIENT
Start: 2022-12-01 | End: 2022-12-01 | Stop reason: SURG

## 2022-12-01 RX ORDER — SODIUM CHLORIDE 9 MG/ML
40 INJECTION, SOLUTION INTRAVENOUS AS NEEDED
Status: DISCONTINUED | OUTPATIENT
Start: 2022-12-01 | End: 2022-12-01 | Stop reason: HOSPADM

## 2022-12-01 RX ORDER — SODIUM CHLORIDE 0.9 % (FLUSH) 0.9 %
10 SYRINGE (ML) INJECTION EVERY 12 HOURS SCHEDULED
Status: DISCONTINUED | OUTPATIENT
Start: 2022-12-01 | End: 2022-12-01 | Stop reason: HOSPADM

## 2022-12-01 RX ORDER — SODIUM CHLORIDE, SODIUM LACTATE, POTASSIUM CHLORIDE, CALCIUM CHLORIDE 600; 310; 30; 20 MG/100ML; MG/100ML; MG/100ML; MG/100ML
9 INJECTION, SOLUTION INTRAVENOUS CONTINUOUS
Status: DISCONTINUED | OUTPATIENT
Start: 2022-12-01 | End: 2022-12-01 | Stop reason: HOSPADM

## 2022-12-01 RX ADMIN — PROPOFOL INJECTABLE EMULSION 350 MG: 10 INJECTION, EMULSION INTRAVENOUS at 10:03

## 2022-12-01 RX ADMIN — LIDOCAINE HYDROCHLORIDE 100 MG: 10 INJECTION, SOLUTION EPIDURAL; INFILTRATION; INTRACAUDAL; PERINEURAL at 10:03

## 2022-12-01 RX ADMIN — SODIUM CHLORIDE, POTASSIUM CHLORIDE, SODIUM LACTATE AND CALCIUM CHLORIDE: 600; 310; 30; 20 INJECTION, SOLUTION INTRAVENOUS at 09:58

## 2022-12-01 NOTE — OP NOTE
EGD and Colonoscopy Procedure Note      Pre-operative Diagnosis: Anemia and change in bowel habits    Post-operative Diagnosis: Esophageal varices, gastritis, sigmoid colon polyp, rectal polyp    Procedure:  EGD with biopsy for H. pylori with cold forceps and  Colonoscopy with polypectomy using cold forceps and cautery with polypectomy site with hot snare    Surgeon: Shantel    Anesthetic: MAC per Rukhsana Emery MD    Estimated Blood Loss: Minimal    Complications: None    Indications: See preoperative diagnosis    Findings/Treatments:   Gastritis-biopsy for H. pylori with cold forcep       Scope Withdrawal Time:  6 minutes      Recommendations: Await pathology      Procedure Details     After discussing the benefits and risks of an EGD and Colonoscopy, benefits and risks not limited to but including:  Bleeding, infection, perforation, aspiration; informed consent was signed.  The patient was taken into the endoscopy suite at [facility] and placed in the left lateral decubitus position.  MAC anesthesia was induced under appropriate monitoring.  Bite block was placed and the gastroscope was inserted thru such and advanced under direct vision to second portion of the duodenum.  A careful inspection was made as the gastroscope was withdrawn, including a retroflexed view of the proximal stomach; findings and interventions are described below.  If biopsies were taken, this was done with the cold biopsy forceps.    The bed was then rotated 180 degrees.  A rectal exam was performed.  Sphincter tone was normal.  The colonoscope was then inserted and carefully advanced to the cecum while visualizing the mucosa.  The cecum was identified by the ileocecal valve and the orifice of the appendix.  Once in the cecum the scope was slowly withdrawn while carefully evaluating the mucosa and deflating air.  There were 2 polyps noted.  1 sigmoid colon polyp and rectal polyp.  Both polypectomy sites bled after the  cold forceps remove them so each area was bovied using the hot snare.  Once hemostasis was achieved the scope was removed and Rachel was taken to the recovery area in stable postoperative condition having tolerated her procedure well.    Soni Funes DO

## 2022-12-01 NOTE — H&P
Rachel Srinivasan 32 y.o. female presents @ the req of NURY Carvajal for eval of anemia.    Chief Complaint   Patient presents with   • Anemia             HPI   Above noted and agree.  This very pleasant 32-year-old female is here to discuss endoscopy.  She was diagnosed with anemia couple of years ago.  She has an autoimmune disease and was on long-term steroids but has been able to wean herself off of those.  She was also getting IVIG infusions but those have also been stopped.  She tolerates a regular diet without nausea or vomiting.  She did notice change in her bowel habits.  She used to have constipation but now she has diarrhea when she moves her bowels.  She has no chest pain or shortness of breath.  She has no fevers or chills.  She has no other complaints.  She has never had endoscopy.      Review of Systems   All other systems reviewed and are negative.            Current Outpatient Medications:   •  butalbital-acetaminophen-caffeine (FIORICET, ESGIC) -40 MG per tablet, Take 1 tablet by mouth Every 6 (Six) Hours As Needed for Headache., Disp: , Rfl:   •  Emgality 120 MG/ML auto-injector pen, INJECT 1 ML EVERY MONTH BY SUBCUTANEOUS ROUTE., Disp: , Rfl:   •  ferrous sulfate 325 (65 FE) MG tablet, Take 325 mg by mouth Daily With Breakfast., Disp: , Rfl:   •  Galcanezumab-gnlm (EMGALITY SC), Inject  under the skin into the appropriate area as directed., Disp: , Rfl:   •  meloxicam (MOBIC) 15 MG tablet, Take 1 tablet by mouth Daily., Disp: , Rfl:   •  mycophenolate (CELLCEPT) 500 MG tablet, Take  by mouth 2 (Two) Times a Day., Disp: , Rfl:   •  pseudoephedrine (SUDAFED) 30 MG tablet, Take 1 tablet by mouth Every 4 (Four) Hours As Needed for Congestion., Disp: , Rfl:   •  riTUXimab (RITUXAN IV), Infuse 1 dose into a venous catheter Every 6 (Six) Months., Disp: , Rfl:         Allergies   Allergen Reactions   • Sumatriptan Nausea Only and Unknown - High Severity     Chest pain  Other reaction(s): Nausea  Only, Unknown  Chest pain  Chest pain             Past Medical History:   Diagnosis Date   • 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HCC)     follows w/Dr at    • Arthritis    • Chronic sore throat     advised by PCP to have sleep study   • GERD (gastroesophageal reflux disease)     d/t steroids   • ILD (interstitial lung disease) ([facility])     had normal PFTs, to have CT   • Kidney stones     history   • Migraine    • Myositis    • Raynaud's syndrome    • Shingles            Past Surgical History:   Procedure Laterality Date   • ADENOIDECTOMY     • D & C HYSTEROSCOPY N/A 6/10/2021    Procedure: diagnostic hysteroscopy, dilatation and curettage;  Surgeon: Pablito Vo MD;  Location: [facility];  Service: Obstetrics/Gynecology;  Laterality: N/A;   • EAR TUBES     • EYE SURGERY Right     eyelid reconstruction   • MUSCLE BIOPSY      thigh right   • SKIN BIOPSY      right thigh   • VENOUS ACCESS DEVICE (PORT) INSERTION N/A 2018    Procedure: INSERTION VENOUS ACCESS DEVICE;  Surgeon: Bo Thomas MD;  Location: [facility];  Service: General   • VENOUS ACCESS DEVICE (PORT) REMOVAL Right 2020    Procedure: REMOVAL VENOUS ACCESS DEVICE;  Surgeon: Bo Thomas MD;  Location: [facility] OR;  Service: General;  Laterality: Right;  REMOVAL VENOUS ACCESS DEVICE           Social History     Tobacco Use   • Smoking status: Former     Packs/day: 0.50     Years: 15.00     Pack years: 7.50     Types: Cigarettes     Quit date: 3/13/2014     Years since quittin.6   • Smokeless tobacco: Never   Vaping Use   • Vaping Use: Never used   Substance Use Topics   • Alcohol use: No   • Drug use: Never           Immunization History   Administered Date(s) Administered   • COVID-19 (PFIZER) PURPLE CAP 2021, 2021   • Pneumococcal, Unspecified 2014           Physical Exam  Vitals and nursing note reviewed.   Constitutional:       Appearance: Normal appearance.   HENT:      Head: Normocephalic and atraumatic.  "  Cardiovascular:      Rate and Rhythm: Normal rate and regular rhythm.   Pulmonary:      Effort: Pulmonary effort is normal.      Breath sounds: Normal breath sounds.   Abdominal:      General: Bowel sounds are normal.      Palpations: Abdomen is soft.   Musculoskeletal:         General: No swelling or tenderness.   Skin:     General: Skin is warm and dry.   Neurological:      General: No focal deficit present.      Mental Status: She is alert and oriented to person, place, and time.   Psychiatric:         Mood and Affect: Mood normal.         Behavior: Behavior normal.         Debilities/Disabilities Identified: None    Emotional Behavior: Appropriate      /72   Pulse 92   Ht 154.9 cm (61\")   Wt 54.4 kg (120 lb)   SpO2 99%   BMI 22.67 kg/m²         Diagnoses and all orders for this visit:    1. Anemia, unspecified type (Primary)    2. Change in bowel habits    I discussed with the patient the benefits and risks of performing endoscopy.  Benefits and risks were not limited to but including bleeding, infection, perforation, complications of anesthesia, aspiration.  The patient appeared to understand and is willing to proceed.    Thank you for allowing me to participate in the care of this interesting patient.                "

## 2022-12-01 NOTE — ANESTHESIA POSTPROCEDURE EVALUATION
Patient: Rachel Srinivasan    Procedure Summary     Date: 12/01/22 Room / Location: [facility] ENDOSCOPY 1 /  [facility]    Anesthesia Start: 0957 Anesthesia Stop: 1043    Procedures:       Esophagogastroduodenoscopy with biopsy  (Esophagus)      Colonoscopy with polypectomy Diagnosis:       Anemia, unspecified type      Change in bowel habits      (Anemia, unspecified type [D64.9])      (Change in bowel habits [R19.4])    Surgeons: Soni Funes DO Provider: Rukhsana Emery MD    Anesthesia Type: MAC ASA Status: 2          Anesthesia Type: MAC    Vitals  Vitals Value Taken Time   /87 12/01/22 1110   Temp 97.6 °F (36.4 °C) 12/01/22 1048   Pulse 90 12/01/22 1110   Resp 16 12/01/22 1110   SpO2 100 % 12/01/22 1110           Post Anesthesia Care and Evaluation    Patient location during evaluation: PHASE II  Patient participation: complete - patient participated  Level of consciousness: awake and alert  Pain score: 0  Pain management: adequate    Airway patency: patent  Anesthetic complications: No anesthetic complications  PONV Status: none  Cardiovascular status: acceptable  Respiratory status: acceptable  Hydration status: acceptable

## 2022-12-01 NOTE — ANESTHESIA PREPROCEDURE EVALUATION
Anesthesia Evaluation     Patient summary reviewed and Nursing notes reviewed   no history of anesthetic complications:  NPO Solid Status: > 8 hours  NPO Liquid Status: > 8 hours           Airway   Mallampati: III  TM distance: >3 FB  Neck ROM: full  Small opening and No difficulty expected  Dental - normal exam     Pulmonary - normal exam    breath sounds clear to auscultation  (+) a smoker (none for 7 years) Former,   Sleep apnea: problable.    ROS comment: Probable interstitial lung disease-normal PFTs and   Cardiovascular - normal exam  Exercise tolerance: poor (<4 METS)    Rhythm: regular  Rate: normal        Neuro/Psych  (+) headaches (miraines), numbness (facial with migraines), psychiatric history Depression,    Dizziness: dizziness with migraines.    ROS Comment: History of weakness due to autoimmune disorder  GI/Hepatic/Renal/Endo    (+)   renal disease stones,   (-) GERD (with steroids, no problems at present time)    Musculoskeletal     (+) back pain, chronic pain,   Abdominal  - normal exam   Substance History - negative use     OB/GYN negative ob/gyn ROS         Other   arthritis (generalized), autoimmune disease (antisynthetase syndrome) , blood dyscrasia anemia,                     Anesthesia Plan    ASA 2     MAC     intravenous induction     Anesthetic plan, risks, benefits, and alternatives have been provided, discussed and informed consent has been obtained with: patient.    Use of blood products discussed with patient  Consented to blood products.

## 2022-12-02 LAB
LAB AP CASE REPORT: NORMAL
PATH REPORT.FINAL DX SPEC: NORMAL
PATH REPORT.GROSS SPEC: NORMAL
UREASE TISS QL: NEGATIVE

## 2022-12-13 ENCOUNTER — TRANSCRIBE ORDERS (OUTPATIENT)
Dept: ULTRASOUND IMAGING | Facility: HOSPITAL | Age: 32
End: 2022-12-13

## 2022-12-13 DIAGNOSIS — R94.5 ABNORMAL RESULTS OF LIVER FUNCTION STUDIES: Primary | ICD-10-CM

## 2022-12-16 ENCOUNTER — HOSPITAL ENCOUNTER (OUTPATIENT)
Dept: ULTRASOUND IMAGING | Facility: HOSPITAL | Age: 32
Discharge: HOME | End: 2022-12-16
Admitting: NURSE PRACTITIONER

## 2022-12-16 DIAGNOSIS — R94.5 ABNORMAL RESULTS OF LIVER FUNCTION STUDIES: ICD-10-CM

## 2022-12-16 PROCEDURE — 76705 ECHO EXAM OF ABDOMEN: CPT

## 2022-12-29 ENCOUNTER — TELEPHONE (OUTPATIENT)
Dept: GASTROENTEROLOGY | Facility: CLINIC | Age: 32
End: 2022-12-29

## 2022-12-29 NOTE — TELEPHONE ENCOUNTER
Referral from PCP for Esophageal Varices     PMH:     12/1/22 Perrenoud performed at EGD and C/S   Post-operative Diagnosis: Esophageal varices, gastritis, sigmoid colon polyp, rectal polyp     PPI Omeprazole daily     Okay to schedule with APRN    LVM for pt to call

## 2022-12-31 ENCOUNTER — APPOINTMENT (OUTPATIENT)
Dept: CT IMAGING | Facility: HOSPITAL | Age: 32
End: 2022-12-31
Payer: COMMERCIAL

## 2022-12-31 ENCOUNTER — HOSPITAL ENCOUNTER (EMERGENCY)
Facility: HOSPITAL | Age: 32
Discharge: HOME | End: 2022-12-31
Attending: EMERGENCY MEDICINE | Admitting: EMERGENCY MEDICINE
Payer: COMMERCIAL

## 2022-12-31 VITALS
RESPIRATION RATE: 16 BRPM | SYSTOLIC BLOOD PRESSURE: 121 MMHG | WEIGHT: 120 LBS | DIASTOLIC BLOOD PRESSURE: 90 MMHG | HEART RATE: 91 BPM | OXYGEN SATURATION: 100 % | BODY MASS INDEX: 23.56 KG/M2 | HEIGHT: 60 IN | TEMPERATURE: 97.2 F

## 2022-12-31 DIAGNOSIS — R10.10 UPPER ABDOMINAL PAIN: Primary | ICD-10-CM

## 2022-12-31 LAB
ALBUMIN SERPL-MCNC: 3.4 G/DL (ref 3.5–5.2)
ALBUMIN/GLOB SERPL: 1.6 G/DL
ALP SERPL-CCNC: 219 U/L (ref 39–117)
ALT SERPL W P-5'-P-CCNC: 66 U/L (ref 1–33)
ANION GAP SERPL CALCULATED.3IONS-SCNC: 9.9 MMOL/L (ref 5–15)
AST SERPL-CCNC: 43 U/L (ref 1–32)
B-HCG UR QL: NEGATIVE
BASOPHILS # BLD AUTO: 0.01 10*3/MM3 (ref 0–0.2)
BASOPHILS NFR BLD AUTO: 0.2 % (ref 0–1.5)
BILIRUB SERPL-MCNC: 0.4 MG/DL (ref 0–1.2)
BILIRUB UR QL STRIP: NEGATIVE
BUN SERPL-MCNC: 9 MG/DL (ref 6–20)
BUN/CREAT SERPL: 18.4 (ref 7–25)
CALCIUM SPEC-SCNC: 8.2 MG/DL (ref 8.6–10.5)
CHLORIDE SERPL-SCNC: 109 MMOL/L (ref 98–107)
CLARITY UR: CLEAR
CO2 SERPL-SCNC: 24.1 MMOL/L (ref 22–29)
COLOR UR: YELLOW
CREAT SERPL-MCNC: 0.49 MG/DL (ref 0.57–1)
DEPRECATED RDW RBC AUTO: 46.3 FL (ref 37–54)
EGFRCR SERPLBLD CKD-EPI 2021: 127 ML/MIN/1.73
EOSINOPHIL # BLD AUTO: 0.02 10*3/MM3 (ref 0–0.4)
EOSINOPHIL NFR BLD AUTO: 0.5 % (ref 0.3–6.2)
ERYTHROCYTE [DISTWIDTH] IN BLOOD BY AUTOMATED COUNT: 15.5 % (ref 12.3–15.4)
GLOBULIN UR ELPH-MCNC: 2.1 GM/DL
GLUCOSE SERPL-MCNC: 93 MG/DL (ref 65–99)
GLUCOSE UR STRIP-MCNC: NEGATIVE MG/DL
HAV IGM SERPL QL IA: NORMAL
HBV CORE IGM SERPL QL IA: NORMAL
HBV SURFACE AG SERPL QL IA: NORMAL
HCT VFR BLD AUTO: 32.3 % (ref 34–46.6)
HCV AB SER DONR QL: NORMAL
HGB BLD-MCNC: 9.5 G/DL (ref 12–15.9)
HGB UR QL STRIP.AUTO: NEGATIVE
IMM GRANULOCYTES # BLD AUTO: 0 10*3/MM3 (ref 0–0.05)
IMM GRANULOCYTES NFR BLD AUTO: 0 % (ref 0–0.5)
KETONES UR QL STRIP: NEGATIVE
LEUKOCYTE ESTERASE UR QL STRIP.AUTO: NEGATIVE
LIPASE SERPL-CCNC: 33 U/L (ref 13–60)
LYMPHOCYTES # BLD AUTO: 0.57 10*3/MM3 (ref 0.7–3.1)
LYMPHOCYTES NFR BLD AUTO: 14.2 % (ref 19.6–45.3)
MCH RBC QN AUTO: 24 PG (ref 26.6–33)
MCHC RBC AUTO-ENTMCNC: 29.4 G/DL (ref 31.5–35.7)
MCV RBC AUTO: 81.6 FL (ref 79–97)
MONOCYTES # BLD AUTO: 0.56 10*3/MM3 (ref 0.1–0.9)
MONOCYTES NFR BLD AUTO: 14 % (ref 5–12)
NEUTROPHILS NFR BLD AUTO: 2.85 10*3/MM3 (ref 1.7–7)
NEUTROPHILS NFR BLD AUTO: 71.1 % (ref 42.7–76)
NITRITE UR QL STRIP: NEGATIVE
PH UR STRIP.AUTO: 7 [PH] (ref 4.5–8)
PLATELET # BLD AUTO: 158 10*3/MM3 (ref 140–450)
PMV BLD AUTO: 11.7 FL (ref 6–12)
POTASSIUM SERPL-SCNC: 3.7 MMOL/L (ref 3.5–5.2)
PROT SERPL-MCNC: 5.5 G/DL (ref 6–8.5)
PROT UR QL STRIP: NEGATIVE
RBC # BLD AUTO: 3.96 10*6/MM3 (ref 3.77–5.28)
SODIUM SERPL-SCNC: 143 MMOL/L (ref 136–145)
SP GR UR STRIP: 1.01 (ref 1–1.03)
UROBILINOGEN UR QL STRIP: NORMAL
WBC NRBC COR # BLD: 4.01 10*3/MM3 (ref 3.4–10.8)

## 2022-12-31 PROCEDURE — 74177 CT ABD & PELVIS W/CONTRAST: CPT

## 2022-12-31 PROCEDURE — 83690 ASSAY OF LIPASE: CPT | Performed by: EMERGENCY MEDICINE

## 2022-12-31 PROCEDURE — 80053 COMPREHEN METABOLIC PANEL: CPT | Performed by: EMERGENCY MEDICINE

## 2022-12-31 PROCEDURE — 36415 COLL VENOUS BLD VENIPUNCTURE: CPT

## 2022-12-31 PROCEDURE — 80074 ACUTE HEPATITIS PANEL: CPT | Performed by: EMERGENCY MEDICINE

## 2022-12-31 PROCEDURE — 96374 THER/PROPH/DIAG INJ IV PUSH: CPT

## 2022-12-31 PROCEDURE — 99284 EMERGENCY DEPT VISIT MOD MDM: CPT

## 2022-12-31 PROCEDURE — 25010000002 ONDANSETRON PER 1 MG: Performed by: EMERGENCY MEDICINE

## 2022-12-31 PROCEDURE — 81025 URINE PREGNANCY TEST: CPT | Performed by: EMERGENCY MEDICINE

## 2022-12-31 PROCEDURE — 85025 COMPLETE CBC W/AUTO DIFF WBC: CPT | Performed by: EMERGENCY MEDICINE

## 2022-12-31 PROCEDURE — 25010000002 KETOROLAC TROMETHAMINE PER 15 MG: Performed by: EMERGENCY MEDICINE

## 2022-12-31 PROCEDURE — 96375 TX/PRO/DX INJ NEW DRUG ADDON: CPT

## 2022-12-31 PROCEDURE — 81003 URINALYSIS AUTO W/O SCOPE: CPT | Performed by: EMERGENCY MEDICINE

## 2022-12-31 PROCEDURE — 25010000002 IOPAMIDOL 61 % SOLUTION: Performed by: EMERGENCY MEDICINE

## 2022-12-31 RX ORDER — SUCRALFATE ORAL 1 G/10ML
1 SUSPENSION ORAL 4 TIMES DAILY
Qty: 420 ML | Refills: 0 | Status: SHIPPED | OUTPATIENT
Start: 2022-12-31 | End: 2023-02-15

## 2022-12-31 RX ORDER — FAMOTIDINE 10 MG/ML
20 INJECTION, SOLUTION INTRAVENOUS ONCE
Status: COMPLETED | OUTPATIENT
Start: 2022-12-31 | End: 2022-12-31

## 2022-12-31 RX ORDER — KETOROLAC TROMETHAMINE 30 MG/ML
15 INJECTION, SOLUTION INTRAMUSCULAR; INTRAVENOUS ONCE
Status: COMPLETED | OUTPATIENT
Start: 2022-12-31 | End: 2022-12-31

## 2022-12-31 RX ORDER — ONDANSETRON HYDROCHLORIDE 8 MG/1
8 TABLET, FILM COATED ORAL EVERY 8 HOURS PRN
Qty: 10 TABLET | Refills: 0 | Status: SHIPPED | OUTPATIENT
Start: 2022-12-31

## 2022-12-31 RX ORDER — ONDANSETRON 2 MG/ML
4 INJECTION INTRAMUSCULAR; INTRAVENOUS ONCE
Status: COMPLETED | OUTPATIENT
Start: 2022-12-31 | End: 2022-12-31

## 2022-12-31 RX ADMIN — FAMOTIDINE 20 MG: 10 INJECTION INTRAVENOUS at 02:49

## 2022-12-31 RX ADMIN — KETOROLAC TROMETHAMINE 15 MG: 30 INJECTION, SOLUTION INTRAMUSCULAR; INTRAVENOUS at 02:49

## 2022-12-31 RX ADMIN — ONDANSETRON 4 MG: 2 INJECTION INTRAMUSCULAR; INTRAVENOUS at 02:57

## 2022-12-31 RX ADMIN — SODIUM CHLORIDE 1000 ML: 9 INJECTION, SOLUTION INTRAVENOUS at 02:42

## 2022-12-31 RX ADMIN — IOPAMIDOL 100 ML: 612 INJECTION, SOLUTION INTRAVENOUS at 03:40

## 2022-12-31 NOTE — DISCHARGE INSTRUCTIONS
Avoid spicy and fatty foods.  Drink plenty of fluids.  Take the Zofran as needed as directed for nausea and vomiting.  Take the Carafate as directed.  Return to the emergency department if there is increasing pain, vomiting, worsening way at all.

## 2022-12-31 NOTE — ED PROVIDER NOTES
Subjective   History of Present Illness  History of Present Illness    Chief complaint: Abdominal pain    Location: Right upper quadrant radiating to the back    Quality/Severity: 7/10 at its worst, 5/10 currently, a \"twisting type pain\"    Timing/Onset/Duration: Started last Thursday, intermittent, becoming more frequent    Modifying Factors: Nothing seems to make it better    Associated Symptoms: No headache.  No fever chills or cough.  No sore throat earache or nasal congestion.  No chest pain or shortness of breath.  No diarrhea or burning when she urinates.  No vaginal bleeding or discharges.  No black, bloody, or tarry stools.    Narrative: This 32-year-old white female presents with right upper quadrant abdominal pain radiating to the back that started last Thursday.  The patient was seen at urgent care on Wednesday and urine had blood in it but the patient states that she was on her period so it may have been contaminated from her period.  The patient has an appointment for a HIDA scan on January 92,023.    PCP: Kerrie Velez    Surgery:  Marco A    GI:  [facility]  Review of Systems   Constitutional: Negative for chills and fever.   HENT: Negative for congestion, ear pain and sore throat.    Respiratory: Negative for cough and shortness of breath.    Cardiovascular: Negative for chest pain.   Gastrointestinal: Positive for abdominal pain. Negative for diarrhea, nausea and vomiting.   Genitourinary: Negative for dysuria.   Musculoskeletal: Negative for back pain.   Skin: Negative for rash.   Neurological: Negative for headaches.   Psychiatric/Behavioral: Negative for confusion.        Medication List      ASK your doctor about these medications    butalbital-acetaminophen-caffeine -40 MG per tablet  Commonly known as: FIORICET, ESGIC     ferrous sulfate 325 (65 FE) MG tablet     omeprazole 40 MG capsule  Commonly known as: priLOSEC  Take 1 capsule by mouth Daily.     ondansetron 8 MG tablet  Commonly known as:  ZOFRAN     RITUXAN IV     traMADol 50 MG tablet  Commonly known as: ULTRAM            Past Medical History:   Diagnosis Date   • 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HCC)     follows w/Dr at    • Arthritis    • Chronic sore throat     advised by PCP to have sleep study   • Esophageal varices (HCC)    • GERD (gastroesophageal reflux disease)     d/t steroids   • ILD (interstitial lung disease) (HCC)     had normal PFTs, to have CT   • Kidney stones     history   • Migraine    • Myositis    • Raynaud's syndrome    • Shingles        Allergies   Allergen Reactions   • Sumatriptan Nausea Only and Unknown - High Severity     Chest pain  Other reaction(s): Nausea Only, Unknown  Chest pain  Chest pain         Past Surgical History:   Procedure Laterality Date   • ADENOIDECTOMY     • COLONOSCOPY W/ POLYPECTOMY N/A 12/1/2022    Procedure: Colonoscopy with polypectomy;  Surgeon: Soni Funes DO;  Location: [facility];  Service: Gastroenterology;  Laterality: N/A;  sigmoid polyp x1  rectal polyp x1   • D & C HYSTEROSCOPY N/A 6/10/2021    Procedure: diagnostic hysteroscopy, dilatation and curettage;  Surgeon: Pablito Vo MD;  Location: [facility];  Service: Obstetrics/Gynecology;  Laterality: N/A;   • EAR TUBES     • ENDOSCOPY N/A 12/1/2022    Procedure: Esophagogastroduodenoscopy with biopsy ;  Surgeon: Soni Funes DO;  Location: [facility];  Service: Gastroenterology;  Laterality: N/A;  nereida test  esophageal varices  gastritis   • EYE SURGERY Right     eyelid reconstruction   • MUSCLE BIOPSY  2014    thigh right   • SKIN BIOPSY      right thigh   • VENOUS ACCESS DEVICE (PORT) INSERTION N/A 7/6/2018    Procedure: INSERTION VENOUS ACCESS DEVICE;  Surgeon: Bo Thomas MD;  Location: [facility];  Service: General   • VENOUS ACCESS DEVICE (PORT) REMOVAL Right 2/7/2020    Procedure: REMOVAL VENOUS ACCESS DEVICE;  Surgeon: Bo Thomas MD;  Location: [facility];  Service: General;  Laterality: Right;  REMOVAL  VENOUS ACCESS DEVICE       Family History   Problem Relation Age of Onset   • Hyperlipidemia Mother    • Hypertension Mother    • Lung cancer Father    • Cancer Father    • Throat cancer Father    • Cervical cancer Brother    • Malig Hyperthermia Neg Hx        Social History     Socioeconomic History   • Marital status:      Spouse name: Jaquan   Tobacco Use   • Smoking status: Former     Packs/day: 0.50     Years: 15.00     Pack years: 7.50     Types: Cigarettes     Quit date: 3/13/2014     Years since quittin.8   • Smokeless tobacco: Never   Vaping Use   • Vaping Use: Never used   Substance and Sexual Activity   • Alcohol use: No   • Drug use: Never   • Sexual activity: Defer           Objective   Physical Exam  Vitals (The temperature is 97.2 °F, pulse 105, respirations 17, /90, room air pulse ox 100%.) and nursing note reviewed.   Constitutional:       Appearance: She is well-developed.   HENT:      Head: Normocephalic and atraumatic.      Mouth/Throat:      Mouth: Mucous membranes are moist.   Cardiovascular:      Rate and Rhythm: Normal rate and regular rhythm.      Heart sounds: Normal heart sounds. No murmur heard.    No friction rub. No gallop.   Pulmonary:      Effort: Pulmonary effort is normal.      Breath sounds: Normal breath sounds.   Abdominal:      General: Bowel sounds are normal. There is distension.      Palpations: Abdomen is soft.      Tenderness: There is abdominal tenderness (Epigastric and right upper quadrant). There is no guarding or rebound.      Hernia: No hernia is present.   Genitourinary:     Comments: Rectal exam: Mild amount of brown stool, heme-negative, normal tone, no masses  Skin:     General: Skin is warm and dry.   Neurological:      General: No focal deficit present.      Mental Status: She is alert and oriented to person, place, and time.         Procedures           ED Course  ED Course as of 22 0452   Sat Dec 31, 2022   0300 The white blood cell count  is 4.01.  The hemoglobin is 9.5 and hematocrit is 32.  The platelet counts 158,000.  The lymphocyte count is 14 and monocyte count 14.  The CBC is otherwise unremarkable. [RC]   0301 On 8/30/2022 the hemoglobin was 9.5.  The hemoglobin is stable today. [RC]   0317 The urinalysis is unremarkable. [RC]   0317 The urine pregnancy test is negative [RC]   0326 The chloride is 109, calcium 8.2, total protein 5.5, albumin 3.4, ALT 66, AST 43, alk phos 219.  The GFR is 127.  The CMP is otherwise unremarkable.    The lipase is normal at 33. [RC]   0334 On 2/15/2022 the ALT was 60, AST 42 and alk phos 182.  These values are mildly elevated today [RC]      ED Course User Index  [RC] Yeyo Rocha MD      Note from surgery visit from Dr. Strickland in the office, general surgery, 12/27/2022  Impression/Plan:  Questionable cholecystitis  I think evaluation by HIDA scan would be helpful. If abnormal, she may wish to consider cholecystectomy though she is advised that this may not improve any or all symptoms. I discussed the generalities, alternatives and risk of the procedure.GAR-INCLUDING BUT NOT LIMITED TO BLEEDING, INFECTION, INJURY TO INTRA ABDOMINAL STRUCTURES, BILE DUCT LEAK OR INJURY, FAILURE TO IMPROVE SYMPTOMS, ETC. PT INDICATES UNDERSTANDING AND AGREEMENT WITH OPERATIVE PLANS. If her HIDA scan is nondiagnostic, I do not feel that surgery would be of benefit to her.       EGD and Colonoscopy Procedure Note   12/1/22 by Dr. Funes     Pre-operative Diagnosis: Anemia and change in bowel habits     Post-operative Diagnosis: Esophageal varices, gastritis, sigmoid colon polyp, rectal polyp     Procedure:  EGD with biopsy for H. pylori with cold forceps and  Colonoscopy with polypectomy using cold forceps and cautery with polypectomy site with hot snare     Surgeon: Shantel     Anesthetic: Rukhsana Pete MD     Estimated Blood Loss: Minimal     Complications: None     Indications: See preoperative  diagnosis     Findings/Treatments:   Gastritis-biopsy for H. pylori with cold forcep                                        Scope Withdrawal Time:  6 minutes      Recommendations: Await pathology        Procedure Details      After discussing the benefits and risks of an EGD and Colonoscopy, benefits and risks not limited to but including:  Bleeding, infection, perforation, aspiration; informed consent was signed.  The patient was taken into the endoscopy suite at [facility] and placed in the left lateral decubitus position.  MAC anesthesia was induced under appropriate monitoring.  Bite block was placed and the gastroscope was inserted thru such and advanced under direct vision to second portion of the duodenum.  A careful inspection was made as the gastroscope was withdrawn, including a retroflexed view of the proximal stomach; findings and interventions are described below.  If biopsies were taken, this was done with the cold biopsy forceps.     The bed was then rotated 180 degrees.  A rectal exam was performed.  Sphincter tone was normal.  The colonoscope was then inserted and carefully advanced to the cecum while visualizing the mucosa.  The cecum was identified by the ileocecal valve and the orifice of the appendix.  Once in the cecum the scope was slowly withdrawn while carefully evaluating the mucosa and deflating air.  There were 2 polyps noted.  1 sigmoid colon polyp and rectal polyp.  Both polypectomy sites bled after the cold forceps remove them so each area was bovied using the hot snare.  Once hemostasis was achieved the scope was removed and Rachel was taken to the recovery area in stable postoperative condition having tolerated her procedure well.     Soni Funes DO    05:14 EST, 12/31/22:  The patient was reassessed.  She has mild epigastric and right upper quadrant tenderness, no rebound, no guarding, positive bowel sounds.  Her vital signs were reviewed and are  stable.    05:57 EST, 12/31/22:  The case was reviewed with Dr. Thomas, on-call for general surgery, who reviewed the scans with Dr. Moss.  There does not appear to be an acute vascular compromise or bowel obstruction or acute surgical disease.  The patient's condition may be related to her antisynthetase syndrome.  The patient has been encouraged to call Dr. Harris's office on Monday to be seen next week without fail.  The patient will be given a prescription for Zofran and Carafate.  She should avoid spicy or fatty foods.  The provisional diagnosis is upper abdominal pain.  The patient has consulted with her rheumatologist regarding her symptoms, and she had no further recommendation.  She sees the rheumatologist at the [facility].  We have no GI coverage at [facility] this weekend, but Dr. Harris will be back in the office next week.                                  MDM    Final diagnoses:   Upper abdominal pain       ED Disposition  ED Disposition     None          No follow-up provider specified.       Medication List      No changes were made to your prescriptions during this visit.          Yeyo Rocha MD  12/31/22 0611       Yeyo Rocha MD  12/31/22 0621

## 2022-12-31 NOTE — ED NOTES
This nurse told provider pt stated \"I feel sick like I am nauseated.\" Provider ordered zofran for pt.

## 2022-12-31 NOTE — ED NOTES
This nurse dimmed lights in room to provide a stimulant free environment to promote rest for patient. Call light is at bedside.

## 2023-01-05 ENCOUNTER — LAB (OUTPATIENT)
Dept: LAB | Facility: HOSPITAL | Age: 33
End: 2023-01-05
Payer: COMMERCIAL

## 2023-01-05 ENCOUNTER — TELEPHONE (OUTPATIENT)
Dept: GASTROENTEROLOGY | Facility: CLINIC | Age: 33
End: 2023-01-05

## 2023-01-05 ENCOUNTER — OFFICE VISIT (OUTPATIENT)
Dept: GASTROENTEROLOGY | Facility: CLINIC | Age: 33
End: 2023-01-05
Payer: COMMERCIAL

## 2023-01-05 VITALS
DIASTOLIC BLOOD PRESSURE: 82 MMHG | HEIGHT: 60 IN | BODY MASS INDEX: 23.05 KG/M2 | WEIGHT: 117.4 LBS | SYSTOLIC BLOOD PRESSURE: 114 MMHG

## 2023-01-05 DIAGNOSIS — K59.04 CHRONIC IDIOPATHIC CONSTIPATION: ICD-10-CM

## 2023-01-05 DIAGNOSIS — R79.89 LFTS ABNORMAL: ICD-10-CM

## 2023-01-05 DIAGNOSIS — D50.9 IRON DEFICIENCY ANEMIA, UNSPECIFIED IRON DEFICIENCY ANEMIA TYPE: Primary | ICD-10-CM

## 2023-01-05 DIAGNOSIS — R18.8 OTHER ASCITES: ICD-10-CM

## 2023-01-05 DIAGNOSIS — R18.8 OTHER ASCITES: Primary | ICD-10-CM

## 2023-01-05 LAB
ALPHA1 GLOB MFR UR ELPH: 256 MG/DL (ref 90–200)
CERULOPLASMIN SERPL-MCNC: 6 MG/DL (ref 19–39)
IGG1 SER-MCNC: <300 MG/DL (ref 700–1600)
IGM SERPL-MCNC: <25 MG/DL (ref 40–230)

## 2023-01-05 PROCEDURE — 99214 OFFICE O/P EST MOD 30 MIN: CPT

## 2023-01-05 PROCEDURE — 82784 ASSAY IGA/IGD/IGG/IGM EACH: CPT

## 2023-01-05 PROCEDURE — 86231 EMA EACH IG CLASS: CPT

## 2023-01-05 PROCEDURE — 86364 TISS TRNSGLTMNASE EA IG CLAS: CPT

## 2023-01-05 PROCEDURE — 36415 COLL VENOUS BLD VENIPUNCTURE: CPT

## 2023-01-05 PROCEDURE — 86258 DGP ANTIBODY EACH IG CLASS: CPT

## 2023-01-05 PROCEDURE — 82390 ASSAY OF CERULOPLASMIN: CPT

## 2023-01-05 PROCEDURE — 86381 MITOCHONDRIAL ANTIBODY EACH: CPT

## 2023-01-05 PROCEDURE — 82103 ALPHA-1-ANTITRYPSIN TOTAL: CPT

## 2023-01-05 NOTE — PROGRESS NOTES
PATIENT INFORMATION  Rachel Srinivasan       - 1990    CHIEF COMPLAINT  Chief Complaint   Patient presents with   • Abdominal Pain     RUQ       HISTORY OF PRESENT ILLNESS  Here for evaluation of RUQ pain, has had pressure under ribs for close to a year, but pain really started . Nausea persistent. Nausea and abdominal pain worse when up and moving around and lasts all day. Heating pad is helpful, using tramadol also helps. No change with food or bowel movements. Stomach swelling around  and initially felt it to be related to menstrual cycle, but did not resolve.     22 EGD and Colon with varices and gastritis,  Dr. Funes. Normal colon with 5yr recall? Now on prilosec and has never felt GERD though.    22 CT abd mild hepatomegaly and moderate splenomegaly, small bell edema consistent with enteritis which is consistent with 1 week hx n/abd pain. Persistent elevation in LFTS for as far back as  at least. Alk phos predominant now. Always attributed elevated enzymes to polymyocytis and medications, but now well controlled and in remission on retuxin for 3 years. Previously was on imuran. Spleenomegaly stable in  upon evaluation with hem/onc and no concern at that time. Platelets normal at most recent lab.    Persisted anemia. Normal MCV, previously microcytic and normal B12. On daily iron supplement, no dark stools, not donating blood.    Normal to skip BM for 7 days, then have diarrhea for several days. On diarrhea days can have both solid and liquid stool. No bleeding, and rectal exam with FOBT was negative. Not taking anything for bowels, was taking miralax awhile ago, but stopped when diarrhea days started. Never taken prescription laxative.       REVIEWED PERTINENT RESULTS/ LABS  Lab Results   Component Value Date    CASEREPORT  2022     Surgical Pathology Report                         Case: [id number]                                  Authorizing Provider:  Shantel  DO Soni    Collected:           12/01/2022 10:33 AM          Ordering Location:     [facility]   Received:            12/01/2022 11:53 AM                                 OR                                                                           Pathologist:           Kathie Spears MD                                                          Specimens:   1) - Large Intestine, Sigmoid Colon, sigmoid polyp x1                                               2) - Large Intestine, Rectum                                                               FINALDX  12/01/2022     1. Colon, Sigmoid, Biopsy:   A. Mild superficial hyperplastic epithelial change.   B. No tubulovillous change nor malignancy.   C. No significant inflammation.    2. Colon, Rectum, Biopsy:    A. Hyperplastic polyp.    mec/pkm        Lab Results   Component Value Date    HGB 9.5 (L) 12/31/2022    MCV 81.6 12/31/2022     12/31/2022    ALT 66 (H) 12/31/2022    AST 43 (H) 12/31/2022    HGBA1C 5.10 01/19/2022    INR 1.1 07/11/2014    FERRITIN 18.30 05/07/2020    IRON 23 (L) 05/07/2020    TIBC 499 05/07/2020      CT Abdomen Pelvis With Contrast    Result Date: 12/31/2022  Narrative: CT ABDOMEN AND PELVIS WITH CONTRAST, 12/31/2022 HISTORY: 32-year-old female in the ED complaining of right upper quadrant abdomen pain and intermittent nausea and vomiting over one week. 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 Prior endoscopy showing esophageal varices. Abnormal liver function testing. Normal bilirubin. TECHNIQUE: CT imaging of the abdomen and pelvis with IV contrast. Radiation dose reduction techniques included automated exposure control. Radiation audit for CT and nuclear cardiology exams in the last 12 months: 0. COMPARISON: *  CT stone study, 9/3/2021. ABDOMEN FINDINGS: Diffuse mural edema is seen throughout mildly dilated, fluid-filled small bowel throughout the abdomen and pelvis.  The colon is uninvolved. Diffuse soft tissue edema is present throughout the abdominal and pelvic mesentery along with small volume ascites. Mild hepatomegaly and moderate splenomegaly. Portal veins, hepatic veins, mesenteric veins, splenic vein and IVC are widely patent. Aorta and central mesenteric arteries are normal in caliber and widely patent. Please two foci of patchy increased enhancement are noted in the left hepatic lobe, nonspecific. The gallbladder is contracted. There is no bile duct dilatation. The pancreas is normal. The urinary bladder is distended, and there is mild bilateral hydronephrosis, greater on the right. There is at least one small nonobstructing right mid renal calculus, but no stone material is seen within the ureters or bladder. PELVIS FINDINGS: Uterus, adnexal regions, and rectum appear normal. Lung base images show no active disease. There is no pulmonary edema or pleural effusion.     Impression: 1.  Diffuse mural edema involving the entire small bowel with mild to moderate generalized dilatation of fluid-filled small bowel. This is associated with extensive mesenteric edema and small volume ascites. Correlate for hypoalbuminemia, diffuse enteritis or other causes for diffuse bowel wall edema. Mesenteric vessels are widely patent. 2.  Hepatosplenomegaly. Patchy foci of increased enhancement scattered within the left hepatic lobe. Follow-up MRI examination is recommended for further characterization. Signer Name: Samson Silva MD  Signed: 12/31/2022 4:44 AM  Workstation Name: [email or web address]  [facility]    Result Date: 12/16/2022  Narrative: Exam: Liver ultrasound 12/16/2022  INDICATION: Autoimmune disease. Abnormal liver function tests. Nausea for one year.  FINDINGS: The pancreas is mostly obscured by bowel gas. The liver slightly increased in echogenicity. No focal lesions are identified. Period there is a small amount ascites around the  liver. Portal vein shows flow into the liver. The gallbladder is contracted. Patient was not tender over the gallbladder. Gallbladder wall thickness is 6 mm. Right kidney is 10.8 cm in length. There is mild hydronephrosis.      Impression: Contracted gallbladder with thickened walls. No stones are visible. Patient was not tender over the gallbladder  Slight increased echogenicity throughout the liver otherwise negative liver  Mild right hydronephrosis  This report was finalized on 12/16/2022 10:12 AM by Dr. Brian Meyer MD.        REVIEW OF SYSTEMS  Review of Systems   Constitutional: Positive for appetite change.   HENT: Negative.    Eyes: Negative.    Respiratory: Negative.    Cardiovascular: Negative.    Gastrointestinal: Positive for abdominal pain (RUQ), constipation, diarrhea and nausea.   Endocrine: Negative.    Genitourinary: Negative.    Musculoskeletal: Positive for back pain.   Skin: Negative.    Allergic/Immunologic: Negative.    Neurological: Negative.    Hematological: Bruises/bleeds easily (anemic ).   Psychiatric/Behavioral: Negative.          ACTIVE PROBLEMS  Patient Active Problem List    Diagnosis    • Ascites [R18.8]    • Anemia [D64.9]    • Change in bowel habits [R19.4]    • Rhythm method of contraception [Z78.9]    • Splenomegaly [R16.1]    • 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HCC) [D89.89]    • Myositis [M60.9]    • Depressive disorder [F32.A]    • Bronchitis [J40]          PAST MEDICAL HISTORY  Past Medical History:   Diagnosis Date   • 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HCC)     follows w/Dr at    • Arthritis    • Chronic sore throat     advised by PCP to have sleep study   • Esophageal varices (HCC)    • GERD (gastroesophageal reflux disease)     d/t steroids   • ILD (interstitial lung disease) ([facility])     had normal PFTs, to have CT   • Kidney stones     history   • Migraine    • Myositis    • Raynaud's syndrome    • Shingles          SURGICAL HISTORY  Past Surgical History:   Procedure Laterality Date   •  ADENOIDECTOMY     • COLONOSCOPY W/ POLYPECTOMY N/A 2022    Procedure: Colonoscopy with polypectomy;  Surgeon: Soni Funes DO;  Location: [facility] OR;  Service: Gastroenterology;  Laterality: N/A;  sigmoid polyp x1  rectal polyp x1   • D & C HYSTEROSCOPY N/A 6/10/2021    Procedure: diagnostic hysteroscopy, dilatation and curettage;  Surgeon: Pablito Vo MD;  Location: [facility] OR;  Service: Obstetrics/Gynecology;  Laterality: N/A;   • EAR TUBES     • ENDOSCOPY N/A 2022    Procedure: Esophagogastroduodenoscopy with biopsy ;  Surgeon: Soni Funes DO;  Location: [facility] OR;  Service: Gastroenterology;  Laterality: N/A;  nereida test  esophageal varices  gastritis   • EYE SURGERY Right     eyelid reconstruction   • MUSCLE BIOPSY  2014    thigh right   • SKIN BIOPSY      right thigh   • VENOUS ACCESS DEVICE (PORT) INSERTION N/A 2018    Procedure: INSERTION VENOUS ACCESS DEVICE;  Surgeon: Bo Thomas MD;  Location: [facility];  Service: General   • VENOUS ACCESS DEVICE (PORT) REMOVAL Right 2020    Procedure: REMOVAL VENOUS ACCESS DEVICE;  Surgeon: Bo Thomas MD;  Location: [facility];  Service: General;  Laterality: Right;  REMOVAL VENOUS ACCESS DEVICE         FAMILY HISTORY  Family History   Problem Relation Age of Onset   • Hyperlipidemia Mother    • Hypertension Mother    • Lung cancer Father    • Cancer Father    • Throat cancer Father    • Cervical cancer Brother    • Malig Hyperthermia Neg Hx          SOCIAL HISTORY  Social History     Occupational History     Employer: UNEMPLOYED   Tobacco Use   • Smoking status: Former     Packs/day: 0.50     Years: 15.00     Pack years: 7.50     Types: Cigarettes     Quit date: 3/13/2014     Years since quittin.8   • Smokeless tobacco: Never   Vaping Use   • Vaping Use: Never used   Substance and Sexual Activity   • Alcohol use: No   • Drug use: Never   • Sexual activity: Defer         CURRENT MEDICATIONS    Current Outpatient  Medications:   •  butalbital-acetaminophen-caffeine (FIORICET, ESGIC) -40 MG per tablet, Take 1 tablet by mouth Every 6 (Six) Hours As Needed for Headache., Disp: , Rfl:   •  ferrous sulfate 325 (65 FE) MG tablet, Take 325 mg by mouth Daily With Breakfast., Disp: , Rfl:   •  omeprazole (priLOSEC) 40 MG capsule, Take 1 capsule by mouth Daily., Disp: 30 capsule, Rfl: 6  •  ondansetron (ZOFRAN) 8 MG tablet, Take 1 tablet by mouth Every 8 (Eight) Hours As Needed for Nausea or Vomiting., Disp: 10 tablet, Rfl: 0  •  riTUXimab (RITUXAN IV), Infuse 1 dose into a venous catheter Every 6 (Six) Months., Disp: , Rfl:   •  sucralfate (CARAFATE) 1 GM/10ML suspension, Take 10 mL by mouth 4 (Four) Times a Day., Disp: 420 mL, Rfl: 0  •  traMADol (ULTRAM) 50 MG tablet, TAKE 2 TABLETS (100 MG TOTAL) BY MOUTH EVERY 12 (TWELVE) HOURS IF NEEDED FOR SEVERE PAIN., Disp: , Rfl:   •  linaclotide (LINZESS) 145 MCG capsule capsule, Take 1 capsule by mouth Every Morning Before Breakfast., Disp: 90 capsule, Rfl: 3    ALLERGIES  Sumatriptan    VITALS  Vitals:    01/05/23 1342   BP: 114/82   BP Location: Left arm   Patient Position: Sitting   Cuff Size: Adult   Weight: 53.3 kg (117 lb 6.4 oz)   Height: 152.4 cm (60\")       PHYSICAL EXAM  Debilities/Disabilities Identified: None  Emotional Behavior: Appropriate  Wt Readings from Last 3 Encounters:   01/05/23 53.3 kg (117 lb 6.4 oz)   12/31/22 54.4 kg (120 lb)   12/28/22 54.4 kg (120 lb)     Ht Readings from Last 1 Encounters:   01/05/23 152.4 cm (60\")     Body mass index is 22.93 kg/m².  Physical Exam  Constitutional:       General: She is not in acute distress.     Appearance: Normal appearance. She is not ill-appearing.   HENT:      Head: Normocephalic and atraumatic.      Mouth/Throat:      Mouth: Mucous membranes are moist.      Pharynx: No posterior oropharyngeal erythema.   Eyes:      General: No scleral icterus.  Cardiovascular:      Rate and Rhythm: Normal rate and regular rhythm.       Heart sounds: Normal heart sounds.   Pulmonary:      Effort: Pulmonary effort is normal.      Breath sounds: Normal breath sounds.   Abdominal:      General: There is distension.      Palpations: Abdomen is soft. There is no mass.      Tenderness: There is abdominal tenderness. There is no guarding or rebound. Negative signs include Maguire's sign.      Hernia: No hernia is present.      Comments: Ascites percussed on exam, abdomen taut   Musculoskeletal:      Cervical back: Neck supple.   Skin:     General: Skin is warm.      Capillary Refill: Capillary refill takes less than 2 seconds.   Neurological:      General: No focal deficit present.      Mental Status: She is alert and oriented to person, place, and time.   Psychiatric:         Mood and Affect: Mood normal.         Behavior: Behavior normal.         Thought Content: Thought content normal.         Judgment: Judgment normal.         CLINICAL DATA REVIEWED   reviewed previous lab results and integrated with today's visit, reviewed notes from other physicians and/or last GI encounter, reviewed previous endoscopy results and available photos, reviewed surgical pathology results from previous biopsies    ASSESSMENT  Diagnoses and all orders for this visit:    Iron deficiency anemia, unspecified iron deficiency anemia type  -     Celiac Comprehensive Panel    LFTs abnormal  -     IgG; Future  -     IgM; Future  -     IgA; Future  -     Ceruloplasmin  -     Mitochondrial Antibodies, M2  -     Alpha - 1 - Antitrypsin  -     Celiac Comprehensive Panel    Other ascites    Chronic idiopathic constipation  -     linaclotide (LINZESS) 145 MCG capsule capsule; Take 1 capsule by mouth Every Morning Before Breakfast.          PLAN    Request HIDA results scheduled Monday per gen sx mejias  Liver work-up labs, paracentesis with labs ordered  Celiac panel for SAMMIE, discussed if abnormal will need f/u EGD with small bowel bx  Linzess and adequate hydration for  constipation  Ceruloplasmin came back positive, so reviewed with pt via phone and ordered 24 hr copper urine    Return in about 1 month (around 2/5/2023).    I have discussed the above plan with the patient.  They verbalize understanding and are in agreement with the plan.  They have been advised to contact the office for any questions, concerns, or changes related to their health.

## 2023-01-06 ENCOUNTER — TELEPHONE (OUTPATIENT)
Dept: GASTROENTEROLOGY | Facility: CLINIC | Age: 33
End: 2023-01-06
Payer: COMMERCIAL

## 2023-01-06 DIAGNOSIS — E83.00 LOW CERULOPLASMIN LEVEL: Primary | ICD-10-CM

## 2023-01-06 LAB
ENDOMYSIUM IGA SER QL: NEGATIVE
GLIADIN PEPTIDE IGA SER-ACNC: 3 UNITS (ref 0–19)
GLIADIN PEPTIDE IGG SER-ACNC: 1 UNITS (ref 0–19)
IGA SERPL-MCNC: 76 MG/DL (ref 87–352)
MITOCHONDRIA M2 IGG SER-ACNC: <20 UNITS (ref 0–20)
TTG IGA SER-ACNC: <2 U/ML (ref 0–3)
TTG IGG SER-ACNC: <2 U/ML (ref 0–5)

## 2023-01-09 ENCOUNTER — TELEPHONE (OUTPATIENT)
Dept: GASTROENTEROLOGY | Facility: CLINIC | Age: 33
End: 2023-01-09
Payer: COMMERCIAL

## 2023-01-09 ENCOUNTER — TRANSCRIBE ORDERS (OUTPATIENT)
Dept: ADMINISTRATIVE | Facility: HOSPITAL | Age: 33
End: 2023-01-09
Payer: COMMERCIAL

## 2023-01-09 DIAGNOSIS — K81.9 CHOLECYSTITIS: Primary | ICD-10-CM

## 2023-01-09 NOTE — TELEPHONE ENCOUNTER
Pain is getting better & swelling is going down. Wants to know if she should still go to the paracentesis appointment on 1/19/23

## 2023-01-10 ENCOUNTER — LAB (OUTPATIENT)
Dept: LAB | Facility: HOSPITAL | Age: 33
End: 2023-01-10
Payer: COMMERCIAL

## 2023-01-10 DIAGNOSIS — E83.00 LOW CERULOPLASMIN LEVEL: ICD-10-CM

## 2023-01-10 PROCEDURE — 81050 URINALYSIS VOLUME MEASURE: CPT

## 2023-01-10 PROCEDURE — 82570 ASSAY OF URINE CREATININE: CPT

## 2023-01-10 PROCEDURE — 82525 ASSAY OF COPPER: CPT

## 2023-01-10 NOTE — TELEPHONE ENCOUNTER
Called and notify patient to keep her appointment on 01/19/2022.    My pain has returned.  She asked, is it common for swelling to come and go like this?  Please advise.

## 2023-01-12 LAB
COPPER 24H UR-MRATE: 8 UG/24 HR (ref 3–35)
COPPER UR-MCNC: 4 UG/L
COPPER/CREAT UR: 10 UG/G CREAT (ref 0–49)
CREAT UR-MCNC: 0.42 G/L (ref 0.3–3)

## 2023-01-18 NOTE — NURSING NOTE
Called and spoke with patient in regards to tomorrows procedure.  NPO after midnight and no blood thinning medications.

## 2023-01-19 ENCOUNTER — TELEPHONE (OUTPATIENT)
Dept: GASTROENTEROLOGY | Facility: CLINIC | Age: 33
End: 2023-01-19
Payer: COMMERCIAL

## 2023-01-19 ENCOUNTER — HOSPITAL ENCOUNTER (OUTPATIENT)
Dept: ULTRASOUND IMAGING | Facility: HOSPITAL | Age: 33
Discharge: HOME | End: 2023-01-19
Payer: COMMERCIAL

## 2023-01-19 DIAGNOSIS — R18.8 OTHER ASCITES: ICD-10-CM

## 2023-01-19 DIAGNOSIS — R79.89 LFT ELEVATION: Primary | ICD-10-CM

## 2023-01-19 DIAGNOSIS — R18.8 OTHER ASCITES: Primary | ICD-10-CM

## 2023-01-19 PROCEDURE — 76705 ECHO EXAM OF ABDOMEN: CPT

## 2023-01-19 NOTE — TELEPHONE ENCOUNTER
Reviewed plan with Dr. Harris and patient. Next step is MRCP, order placed, depending on MRCP results, may need liver biopsy.     Abdominal discomfort and swelling is improved since LOV.

## 2023-01-19 NOTE — TELEPHONE ENCOUNTER
PT  Called to ask Rachel what her next steps in treatment is.     She wanted to let her know that she had her Paracentesis yesterday and there was no fluid...would like a call back at [phone]

## 2023-01-30 ENCOUNTER — HOSPITAL ENCOUNTER (OUTPATIENT)
Dept: NUCLEAR MEDICINE | Facility: HOSPITAL | Age: 33
Discharge: HOME | End: 2023-01-30
Payer: COMMERCIAL

## 2023-01-30 DIAGNOSIS — K81.9 CHOLECYSTITIS: ICD-10-CM

## 2023-01-30 PROCEDURE — A9537 TC99M MEBROFENIN: HCPCS | Performed by: SURGERY

## 2023-01-30 PROCEDURE — 78226 HEPATOBILIARY SYSTEM IMAGING: CPT

## 2023-01-30 PROCEDURE — 0 TECHNETIUM TC 99M MEBROFENIN KIT: Performed by: SURGERY

## 2023-01-30 RX ORDER — KIT FOR THE PREPARATION OF TECHNETIUM TC 99M MEBROFENIN 45 MG/10ML
1 INJECTION, POWDER, LYOPHILIZED, FOR SOLUTION INTRAVENOUS
Status: COMPLETED | OUTPATIENT
Start: 2023-01-30 | End: 2023-01-30

## 2023-01-30 RX ADMIN — MEBROFENIN 1 DOSE: 45 INJECTION, POWDER, LYOPHILIZED, FOR SOLUTION INTRAVENOUS at 10:54

## 2023-01-31 ENCOUNTER — TELEPHONE (OUTPATIENT)
Dept: GASTROENTEROLOGY | Facility: CLINIC | Age: 33
End: 2023-01-31
Payer: COMMERCIAL

## 2023-01-31 NOTE — TELEPHONE ENCOUNTER
Pt needs to be reschedule to see SKO after MRI on 2-10  Will send to [place] to review provider's schedule possible appt time.    Offered 3-1-23 to pt- too far out, per APRN request to be seen sooner.

## 2023-02-01 ENCOUNTER — TELEPHONE (OUTPATIENT)
Dept: GASTROENTEROLOGY | Facility: CLINIC | Age: 33
End: 2023-02-01
Payer: COMMERCIAL

## 2023-02-01 NOTE — TELEPHONE ENCOUNTER
PT called wanting to talk to Rachel Joni about her gall bladder treatment and wants to know if it will effect the treatment plan she has here with Rachel.    Requests a call back at [phone]

## 2023-02-01 NOTE — TELEPHONE ENCOUNTER
Discussed with patient, has persistent dull RUQ pain, but HIDA did not exacerbate or change symptoms. Recommend to complete MRCP and not to schedule elective cholecystectomy at this time. Will re-evaluate.

## 2023-02-10 ENCOUNTER — HOSPITAL ENCOUNTER (OUTPATIENT)
Dept: MRI IMAGING | Facility: HOSPITAL | Age: 33
Discharge: HOME | End: 2023-02-10
Admitting: INTERNAL MEDICINE
Payer: COMMERCIAL

## 2023-02-10 DIAGNOSIS — R79.89 LFT ELEVATION: ICD-10-CM

## 2023-02-10 PROCEDURE — 74183 MRI ABD W/O CNTR FLWD CNTR: CPT

## 2023-02-10 PROCEDURE — 0 GADOBENATE DIMEGLUMINE 529 MG/ML SOLUTION: Performed by: INTERNAL MEDICINE

## 2023-02-10 PROCEDURE — A9577 INJ MULTIHANCE: HCPCS | Performed by: INTERNAL MEDICINE

## 2023-02-10 RX ADMIN — GADOBENATE DIMEGLUMINE 11 ML: 529 INJECTION, SOLUTION INTRAVENOUS at 09:06

## 2023-02-15 ENCOUNTER — OFFICE VISIT (OUTPATIENT)
Dept: GASTROENTEROLOGY | Facility: CLINIC | Age: 33
End: 2023-02-15
Payer: COMMERCIAL

## 2023-02-15 ENCOUNTER — LAB (OUTPATIENT)
Dept: LAB | Facility: HOSPITAL | Age: 33
End: 2023-02-15
Payer: COMMERCIAL

## 2023-02-15 VITALS
HEIGHT: 60 IN | WEIGHT: 109.4 LBS | DIASTOLIC BLOOD PRESSURE: 80 MMHG | SYSTOLIC BLOOD PRESSURE: 110 MMHG | BODY MASS INDEX: 21.48 KG/M2

## 2023-02-15 DIAGNOSIS — K74.69 OTHER CIRRHOSIS OF LIVER: Primary | ICD-10-CM

## 2023-02-15 DIAGNOSIS — D50.0 IRON DEFICIENCY ANEMIA DUE TO CHRONIC BLOOD LOSS: ICD-10-CM

## 2023-02-15 DIAGNOSIS — R19.7 DIARRHEA, UNSPECIFIED TYPE: ICD-10-CM

## 2023-02-15 LAB
ALBUMIN SERPL-MCNC: 3.6 G/DL (ref 3.5–5.2)
ALBUMIN/GLOB SERPL: 1.8 G/DL
ALP SERPL-CCNC: 237 U/L (ref 39–117)
ALT SERPL W P-5'-P-CCNC: 86 U/L (ref 1–33)
ANION GAP SERPL CALCULATED.3IONS-SCNC: 9.3 MMOL/L (ref 5–15)
AST SERPL-CCNC: 53 U/L (ref 1–32)
BASOPHILS # BLD AUTO: 0.01 10*3/MM3 (ref 0–0.2)
BASOPHILS NFR BLD AUTO: 0.2 % (ref 0–1.5)
BILIRUB SERPL-MCNC: 0.4 MG/DL (ref 0–1.2)
BUN SERPL-MCNC: 9 MG/DL (ref 6–20)
BUN/CREAT SERPL: 20.9 (ref 7–25)
CALCIUM SPEC-SCNC: 8.3 MG/DL (ref 8.6–10.5)
CHLORIDE SERPL-SCNC: 107 MMOL/L (ref 98–107)
CO2 SERPL-SCNC: 23.7 MMOL/L (ref 22–29)
CREAT SERPL-MCNC: 0.43 MG/DL (ref 0.57–1)
DEPRECATED RDW RBC AUTO: 43.9 FL (ref 37–54)
EGFRCR SERPLBLD CKD-EPI 2021: 131.1 ML/MIN/1.73
EOSINOPHIL # BLD AUTO: 0.01 10*3/MM3 (ref 0–0.4)
EOSINOPHIL NFR BLD AUTO: 0.2 % (ref 0.3–6.2)
ERYTHROCYTE [DISTWIDTH] IN BLOOD BY AUTOMATED COUNT: 15.2 % (ref 12.3–15.4)
GLOBULIN UR ELPH-MCNC: 2 GM/DL
GLUCOSE SERPL-MCNC: 78 MG/DL (ref 65–99)
HCT VFR BLD AUTO: 30.6 % (ref 34–46.6)
HGB BLD-MCNC: 9 G/DL (ref 12–15.9)
IMM GRANULOCYTES # BLD AUTO: 0.02 10*3/MM3 (ref 0–0.05)
IMM GRANULOCYTES NFR BLD AUTO: 0.4 % (ref 0–0.5)
INR PPP: 1.37 (ref 0.9–1.1)
IRON 24H UR-MRATE: 28 MCG/DL (ref 37–145)
IRON SATN MFR SERPL: 6 % (ref 20–50)
LYMPHOCYTES # BLD AUTO: 0.74 10*3/MM3 (ref 0.7–3.1)
LYMPHOCYTES NFR BLD AUTO: 16 % (ref 19.6–45.3)
MCH RBC QN AUTO: 23.4 PG (ref 26.6–33)
MCHC RBC AUTO-ENTMCNC: 29.4 G/DL (ref 31.5–35.7)
MCV RBC AUTO: 79.7 FL (ref 79–97)
MONOCYTES # BLD AUTO: 0.66 10*3/MM3 (ref 0.1–0.9)
MONOCYTES NFR BLD AUTO: 14.3 % (ref 5–12)
NEUTROPHILS NFR BLD AUTO: 3.19 10*3/MM3 (ref 1.7–7)
NEUTROPHILS NFR BLD AUTO: 68.9 % (ref 42.7–76)
NRBC BLD AUTO-RTO: 0 /100 WBC (ref 0–0.2)
PLATELET # BLD AUTO: 165 10*3/MM3 (ref 140–450)
PMV BLD AUTO: 12.1 FL (ref 6–12)
POTASSIUM SERPL-SCNC: 3.2 MMOL/L (ref 3.5–5.2)
PROT SERPL-MCNC: 5.6 G/DL (ref 6–8.5)
PROTHROMBIN TIME: 17.1 SECONDS (ref 12.1–15)
RBC # BLD AUTO: 3.84 10*6/MM3 (ref 3.77–5.28)
SODIUM SERPL-SCNC: 140 MMOL/L (ref 136–145)
TIBC SERPL-MCNC: 478 MCG/DL (ref 298–536)
TRANSFERRIN SERPL-MCNC: 321 MG/DL (ref 200–360)
WBC NRBC COR # BLD: 4.63 10*3/MM3 (ref 3.4–10.8)

## 2023-02-15 PROCEDURE — 36415 COLL VENOUS BLD VENIPUNCTURE: CPT | Performed by: INTERNAL MEDICINE

## 2023-02-15 PROCEDURE — 85610 PROTHROMBIN TIME: CPT | Performed by: INTERNAL MEDICINE

## 2023-02-15 PROCEDURE — 80053 COMPREHEN METABOLIC PANEL: CPT | Performed by: INTERNAL MEDICINE

## 2023-02-15 PROCEDURE — 84466 ASSAY OF TRANSFERRIN: CPT | Performed by: INTERNAL MEDICINE

## 2023-02-15 PROCEDURE — 83540 ASSAY OF IRON: CPT | Performed by: INTERNAL MEDICINE

## 2023-02-15 PROCEDURE — 99213 OFFICE O/P EST LOW 20 MIN: CPT | Performed by: INTERNAL MEDICINE

## 2023-02-15 PROCEDURE — 85025 COMPLETE CBC W/AUTO DIFF WBC: CPT | Performed by: INTERNAL MEDICINE

## 2023-02-15 RX ORDER — PROMETHAZINE HYDROCHLORIDE 25 MG/1
TABLET ORAL
COMMUNITY

## 2023-02-15 NOTE — PROGRESS NOTES
PATIENT INFORMATION  Rachel Srinivasan       - 1990    CHIEF COMPLAINT  Chief Complaint   Patient presents with   • Elevated Hepatic Enzymes   • Iron deficient anemia   • Ascites   • Chronic idiopathic constipation       HISTORY OF PRESENT ILLNESS  Her to review her imazging and al signs point to cirrhosis and splenomegaly and her HIDA did NOT reproduce her symptoms    WRT her next steps she goes to  for rheum and jonny then use their GI for a Hepatology review- notably Biopsy and w/u for possible transplant status    Also her COnstipatio is now Diarrhea 2-4 timea a day is having Nocturnal events as well , las ABx were November but her Diarrhea is a month old so jonny get stol studies      REVIEWED PERTINENT RESULTS/ LABS  Lab Results   Component Value Date    CASEREPORT  2022     Surgical Pathology Report                         Case: [id number]                                  Authorizing Provider:  Soni Funes DO    Collected:           2022 10:33 AM          Ordering Location:     [facility]   Received:            2022 11:53 AM                                 OR                                                                           Pathologist:           Kathie Spears MD                                                          Specimens:   1) - Large Intestine, Sigmoid Colon, sigmoid polyp x1                                               2) - Large Intestine, Rectum                                                               FINALDX  2022     1. Colon, Sigmoid, Biopsy:   A. Mild superficial hyperplastic epithelial change.   B. No tubulovillous change nor malignancy.   C. No significant inflammation.    2. Colon, Rectum, Biopsy:    A. Hyperplastic polyp.    [facility]        Lab Results   Component Value Date    HGB 9.5 (L) 2022    MCV 81.6 2022     2022    ALT 66 (H) 2022    AST 43 (H) 2022    HGBA1C 5.10 2022     INR 1.37 (H) 02/15/2023    FERRITIN 18.30 05/07/2020    IRON 23 (L) 05/07/2020    TIBC 499 05/07/2020      NM HIDA SCAN WITHOUT PHARMACOLOGICAL INTERVENTION    Result Date: 1/30/2023  Narrative: EXAM: HEPATOBILIARY STUDY WITHOUT KINEVAC 1/30/2023  INDICATION: RIGHT QUADRANT PAIN NAUSEA FOR ONE YEAR   DOSE: 5.7 mCi of technetium 99 M Choletec  COMPARISON: Ultrasound 1/19/2023  FINDINGS: There is normal distribution throughout the liver with prompt visualization of the gallbladder, bile duct and small bowel. At 60 minutes patient was given 8 ounces of boost drink (Kinevac is not available) . The 30 minute ejection fraction: 9%       Impression: Low ejection fraction 9%. Otherwise normal  This report was finalized on 1/30/2023 2:14 PM by Dr. Brian Meyer MD.      MRI abdomen w wo contrast mrcp    Result Date: 2/10/2023  Narrative: MRI ABDOMEN WITH CONTRAST, INCLUDING MRCP, 2/10/2023  HISTORY: 32-year-old female with autoimmune disorder (antisynthetase syndrome with myositis) and abnormal liver enzymes. She is at risk for autoimmune liver disease. History of esophageal varices, splenomegaly and ascites.  TECHNIQUE: MRI examination of the abdomen before and after gadolinium contrast administration (11 cc MultiHance), including multiphase postcontrast imaging. MRCP sequences were also obtained.  COMPARISON: *  CT abdomen/pelvis, 12/31/2022  FINDINGS: Suspected hepatic cirrhosis with mildly nodular hepatic contour and diffuse parenchymal nodularity visible on the GRE opposed phase sequence. Moderately severe splenomegaly with splenic length measuring 15.0 cm. Small volume ascites throughout the visualized abdomen with central mesenteric edema.  Diffuse periportal edema is present, best seen on T2 sequence (example, series 17). This is nonspecific and can be seen with vascular congestion, hepatitis, hypoproteinemia, cholangitis, etc. The gallbladder wall also appears diffusely edematous. On MRCP, there is no appreciable  intrahepatic bile duct dilatation or stricturing, the extrahepatic bile duct appears normal.  Patchy regions of non mass-like increased parenchymal enhancement are noted throughout the liver, greatest in the left hepatic lobe, also visible on the prior CT study. This may be associated with intrahepatic shunting or cholangitis but is nonspecific. Portal veins, hepatic veins, IVC and splenic vein are widely patent.       Impression: 1.  Findings suggesting hepatic cirrhosis with portal venous hypertension. Splenomegaly. Given autoimmune history, primary biliary cirrhosis or PSC are considerations. 2.  Ascites, mesenteric edema, periportal edema and likely gallbladder wall edema. 3.  Patchy regions of non mass-like parenchymal enhancement scattered within the liver, also visible on the prior CT study. This may represent regions of intrahepatic vascular shunting or may be associated with cholangitis. 4.  Normal caliber intrahepatic and extra hepatic bile ducts. 5.  Hepatic vasculature is patent.  This report was finalized on 2/10/2023 5:13 PM by Dr. Samson Silva MD.      US Abdomen Limited    Result Date: 1/19/2023  Narrative: Exam: Limited abdomen ultrasound 1/19/2022  INDICATION: Ascites which was seen on previous CT scan 12/31/2022  Patient scheduled for paracentesis today  FINDINGS: Ultrasound of the 4 quadrants of the abdomen today does not show any ascites.      Impression: Paracentesis was not performed. No ascites is visible on today's examination  This report was finalized on 1/19/2023 8:59 AM by Dr. Brian Meyer MD.        REVIEW OF SYSTEMS  Review of Systems   Constitutional: Negative for activity change, chills, fever and unexpected weight change.   HENT: Negative for congestion.    Eyes: Negative for visual disturbance.   Respiratory: Negative for shortness of breath.    Cardiovascular: Negative for chest pain and palpitations.   Gastrointestinal: Positive for abdominal distention, abdominal pain,  diarrhea and nausea. Negative for blood in stool.   Endocrine: Negative for cold intolerance and heat intolerance.   Genitourinary: Negative for hematuria.   Musculoskeletal: Negative for gait problem.   Skin: Negative for color change.   Allergic/Immunologic: Negative for immunocompromised state.   Neurological: Negative for weakness and light-headedness.   Hematological: Negative for adenopathy.   Psychiatric/Behavioral: Negative for sleep disturbance. The patient is not nervous/anxious.          ACTIVE PROBLEMS  Patient Active Problem List    Diagnosis    • Ascites [R18.8]    • Anemia [D64.9]    • Change in bowel habits [R19.4]    • Rhythm method of contraception [Z78.9]    • Splenomegaly [R16.1]    • 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HCC) [D89.89]    • Myositis [M60.9]    • Depressive disorder [F32.A]    • Bronchitis [J40]          PAST MEDICAL HISTORY  Past Medical History:   Diagnosis Date   • 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HCC)     follows w/Dr at    • Arthritis    • Chronic sore throat     advised by PCP to have sleep study   • Esophageal varices (HCC)    • GERD (gastroesophageal reflux disease)     d/t steroids   • ILD (interstitial lung disease) (HCC)     had normal PFTs, to have CT   • Kidney stones     history   • Migraine    • Myositis    • Raynaud's syndrome    • Shingles          SURGICAL HISTORY  Past Surgical History:   Procedure Laterality Date   • ADENOIDECTOMY     • COLONOSCOPY W/ POLYPECTOMY N/A 12/1/2022    Procedure: Colonoscopy with polypectomy;  Surgeon: Soni Funes DO;  Location: [facility];  Service: Gastroenterology;  Laterality: N/A;  sigmoid polyp x1  rectal polyp x1   • D & C HYSTEROSCOPY N/A 6/10/2021    Procedure: diagnostic hysteroscopy, dilatation and curettage;  Surgeon: Pablito Vo MD;  Location: [facility];  Service: Obstetrics/Gynecology;  Laterality: N/A;   • EAR TUBES     • ENDOSCOPY N/A 12/1/2022    Procedure: Esophagogastroduodenoscopy with biopsy ;  Surgeon:  Soni Funes DO;  Location: [facility] OR;  Service: Gastroenterology;  Laterality: N/A;  nereida test  esophageal varices  gastritis   • EYE SURGERY Right     eyelid reconstruction   • MUSCLE BIOPSY  2014    thigh right   • SKIN BIOPSY      right thigh   • VENOUS ACCESS DEVICE (PORT) INSERTION N/A 2018    Procedure: INSERTION VENOUS ACCESS DEVICE;  Surgeon: Bo Thomas MD;  Location:  [facility] OR;  Service: General   • VENOUS ACCESS DEVICE (PORT) REMOVAL Right 2020    Procedure: REMOVAL VENOUS ACCESS DEVICE;  Surgeon: Bo Thomas MD;  Location:  [facility] OR;  Service: General;  Laterality: Right;  REMOVAL VENOUS ACCESS DEVICE         FAMILY HISTORY  Family History   Problem Relation Age of Onset   • Hyperlipidemia Mother    • Hypertension Mother    • Lung cancer Father    • Cancer Father    • Throat cancer Father    • Cervical cancer Brother    • Malig Hyperthermia Neg Hx          SOCIAL HISTORY  Social History     Occupational History     Employer: UNEMPLOYED   Tobacco Use   • Smoking status: Former     Packs/day: 0.50     Years: 15.00     Pack years: 7.50     Types: Cigarettes     Quit date: 3/13/2014     Years since quittin.9   • Smokeless tobacco: Never   Vaping Use   • Vaping Use: Never used   Substance and Sexual Activity   • Alcohol use: No   • Drug use: Never   • Sexual activity: Defer         CURRENT MEDICATIONS    Current Outpatient Medications:   •  butalbital-acetaminophen-caffeine (FIORICET, ESGIC) -40 MG per tablet, Take 1 tablet by mouth Every 6 (Six) Hours As Needed for Headache., Disp: , Rfl:   •  ferrous sulfate 325 (65 FE) MG tablet, Take 325 mg by mouth Daily With Breakfast., Disp: , Rfl:   •  omeprazole (priLOSEC) 40 MG capsule, Take 1 capsule by mouth Daily., Disp: 30 capsule, Rfl: 6  •  ondansetron (ZOFRAN) 8 MG tablet, Take 1 tablet by mouth Every 8 (Eight) Hours As Needed for Nausea or Vomiting., Disp: 10 tablet, Rfl: 0  •  promethazine (PHENERGAN) 25 MG tablet,  "promethazine 25 mg tablet  TAKE 1 TABLET BY MOUTH EVERY 8 HOURS AS NEEDED, Disp: , Rfl:   •  riTUXimab (RITUXAN IV), Infuse 1 dose into a venous catheter Every 6 (Six) Months., Disp: , Rfl:   •  traMADol (ULTRAM) 50 MG tablet, TAKE 2 TABLETS (100 MG TOTAL) BY MOUTH EVERY 12 (TWELVE) HOURS IF NEEDED FOR SEVERE PAIN., Disp: , Rfl:     ALLERGIES  Sumatriptan    VITALS  Vitals:    02/15/23 0809   BP: 110/80   BP Location: Left arm   Patient Position: Sitting   Cuff Size: Adult   Weight: 49.6 kg (109 lb 6.4 oz)   Height: 152.4 cm (60\")       PHYSICAL EXAM  Debilities/Disabilities Identified: None  Emotional Behavior: Appropriate  Wt Readings from Last 3 Encounters:   02/15/23 49.6 kg (109 lb 6.4 oz)   02/09/23 54.4 kg (120 lb)   01/05/23 53.3 kg (117 lb 6.4 oz)     Ht Readings from Last 1 Encounters:   02/15/23 152.4 cm (60\")     Body mass index is 21.37 kg/m².  Physical Exam  Constitutional:       Appearance: She is well-developed. She is not diaphoretic.      Comments: Petite   HENT:      Head: Normocephalic and atraumatic.   Eyes:      General: No scleral icterus.     Conjunctiva/sclera: Conjunctivae normal.      Pupils: Pupils are equal, round, and reactive to light.   Neck:      Thyroid: No thyromegaly.   Cardiovascular:      Rate and Rhythm: Normal rate and regular rhythm.      Heart sounds: Normal heart sounds. No murmur heard.    No gallop.   Pulmonary:      Effort: Pulmonary effort is normal.      Breath sounds: Normal breath sounds. No wheezing or rales.   Abdominal:      General: Bowel sounds are normal. There is no distension or abdominal bruit.      Palpations: Abdomen is soft. There is no shifting dullness, fluid wave or mass.      Tenderness: There is no abdominal tenderness. There is no guarding. Negative signs include Maguire's sign.      Hernia: There is no hernia in the ventral area.   Musculoskeletal:         General: Normal range of motion.      Cervical back: Normal range of motion and neck supple. "   Lymphadenopathy:      Cervical: No cervical adenopathy.   Skin:     General: Skin is warm and dry.      Findings: No erythema or rash.      Comments: No signs of CLD   Neurological:      Mental Status: She is alert and oriented to person, place, and time.   Psychiatric:         Mood and Affect: Mood normal.         Behavior: Behavior normal.         CLINICAL DATA REVIEWED   reviewed previous lab results and integrated with today's visit, reviewed notes from other physicians and/or last GI encounter, reviewed previous endoscopy results and available photos, reviewed surgical pathology results from previous biopsies    ASSESSMENT  Diagnoses and all orders for this visit:    Other cirrhosis of liver (HCC)  -     Ambulatory Referral to Hepatology    Diarrhea, unspecified type  -     Clostridioides difficile Toxin, PCR - Stool, Per Rectum; Future  -     Gastrointestinal Panel, PCR - Stool, Per Rectum; Future  -     Fecal Fat, Qualitative - Stool, Per Rectum    Iron deficiency anemia due to chronic blood loss  -     CBC & Differential  -     Comprehensive Metabolic Panel  -     Protime-INR  -     Iron Profile    Other orders  -     promethazine (PHENERGAN) 25 MG tablet; promethazine 25 mg tablet   TAKE 1 TABLET BY MOUTH EVERY 8 HOURS AS NEEDED          PLAN  Work up her new Diarrhea ( baseline constipated) and refer to  for Liver w/u.     Return if symptoms worsen or fail to improve.    I have discussed the above plan with the patient.  They verbalize understanding and are in agreement with the plan.  They have been advised to contact the office for any questions, concerns, or changes related to their health.

## 2023-02-18 ENCOUNTER — LAB (OUTPATIENT)
Dept: LAB | Facility: HOSPITAL | Age: 33
End: 2023-02-18
Payer: COMMERCIAL

## 2023-02-18 DIAGNOSIS — R19.7 DIARRHEA, UNSPECIFIED TYPE: ICD-10-CM

## 2023-02-18 LAB
ADV 40+41 DNA STL QL NAA+NON-PROBE: NOT DETECTED
ASTRO TYP 1-8 RNA STL QL NAA+NON-PROBE: NOT DETECTED
C CAYETANENSIS DNA STL QL NAA+NON-PROBE: NOT DETECTED
C COLI+JEJ+UPSA DNA STL QL NAA+NON-PROBE: NOT DETECTED
CRYPTOSP DNA STL QL NAA+NON-PROBE: NOT DETECTED
E HISTOLYT DNA STL QL NAA+NON-PROBE: NOT DETECTED
EAEC PAA PLAS AGGR+AATA ST NAA+NON-PRB: NOT DETECTED
EC STX1+STX2 GENES STL QL NAA+NON-PROBE: NOT DETECTED
EPEC EAE GENE STL QL NAA+NON-PROBE: NOT DETECTED
ETEC LTA+ST1A+ST1B TOX ST NAA+NON-PROBE: NOT DETECTED
FATTY ACIDS: NORMAL
G LAMBLIA DNA STL QL NAA+NON-PROBE: NOT DETECTED
NEUTRAL FATS: NORMAL
NOROVIRUS GI+II RNA STL QL NAA+NON-PROBE: DETECTED
P SHIGELLOIDES DNA STL QL NAA+NON-PROBE: NOT DETECTED
RVA RNA STL QL NAA+NON-PROBE: NOT DETECTED
S ENT+BONG DNA STL QL NAA+NON-PROBE: NOT DETECTED
SAPO I+II+IV+V RNA STL QL NAA+NON-PROBE: NOT DETECTED
SHIGELLA SP+EIEC IPAH ST NAA+NON-PROBE: NOT DETECTED
V CHOL+PARA+VUL DNA STL QL NAA+NON-PROBE: NOT DETECTED
V CHOLERAE DNA STL QL NAA+NON-PROBE: NOT DETECTED
Y ENTEROCOL DNA STL QL NAA+NON-PROBE: NOT DETECTED

## 2023-02-18 PROCEDURE — 87507 IADNA-DNA/RNA PROBE TQ 12-25: CPT

## 2023-02-18 PROCEDURE — 89125 SPECIMEN FAT STAIN: CPT | Performed by: INTERNAL MEDICINE

## 2023-02-20 ENCOUNTER — TELEPHONE (OUTPATIENT)
Dept: GASTROENTEROLOGY | Facility: CLINIC | Age: 33
End: 2023-02-20
Payer: COMMERCIAL

## 2023-02-20 NOTE — TELEPHONE ENCOUNTER
Called pt with GI panel results    Cdiff was not collected- Notified lab  Lab will contact  see if enough specimen to collect    Pt stated she turned in liquid stool to lab on 02/18/2023

## 2023-02-24 ENCOUNTER — LAB (OUTPATIENT)
Dept: LAB | Facility: HOSPITAL | Age: 33
End: 2023-02-24
Payer: COMMERCIAL

## 2023-02-24 DIAGNOSIS — R19.7 DIARRHEA, UNSPECIFIED TYPE: ICD-10-CM

## 2023-02-24 LAB — C DIFF TOX GENS STL QL NAA+PROBE: NEGATIVE

## 2023-02-24 PROCEDURE — 87493 C DIFF AMPLIFIED PROBE: CPT

## 2023-03-02 ENCOUNTER — TRANSCRIBE ORDERS (OUTPATIENT)
Dept: ADMINISTRATIVE | Facility: HOSPITAL | Age: 33
End: 2023-03-02
Payer: COMMERCIAL

## 2023-03-02 ENCOUNTER — LAB (OUTPATIENT)
Dept: LAB | Facility: HOSPITAL | Age: 33
End: 2023-03-02
Payer: COMMERCIAL

## 2023-03-02 DIAGNOSIS — Z79.899 HIGH RISK MEDICATION USE: Primary | ICD-10-CM

## 2023-03-02 DIAGNOSIS — Z79.899 HIGH RISK MEDICATION USE: ICD-10-CM

## 2023-03-02 LAB
AMPHET+METHAMPHET UR QL: NEGATIVE
AMPHETAMINES UR QL: NEGATIVE
BARBITURATES UR QL SCN: POSITIVE
BENZODIAZ UR QL SCN: NEGATIVE
BUPRENORPHINE SERPL-MCNC: NEGATIVE NG/ML
CANNABINOIDS SERPL QL: NEGATIVE
COCAINE UR QL: NEGATIVE
METHADONE UR QL SCN: NEGATIVE
OPIATES UR QL: NEGATIVE
OXYCODONE UR QL SCN: NEGATIVE
PCP UR QL SCN: NEGATIVE
PROPOXYPH UR QL: NEGATIVE
TRICYCLICS UR QL SCN: NEGATIVE

## 2023-03-02 PROCEDURE — 80306 DRUG TEST PRSMV INSTRMNT: CPT

## 2023-03-02 RX ORDER — MONTELUKAST SODIUM 4 MG/1
2 TABLET, CHEWABLE ORAL 2 TIMES DAILY
Qty: 60 TABLET | Refills: 11 | Status: SHIPPED | OUTPATIENT
Start: 2023-03-02 | End: 2023-03-06 | Stop reason: SDUPTHER

## 2023-03-04 ENCOUNTER — HOSPITAL ENCOUNTER (EMERGENCY)
Facility: HOSPITAL | Age: 33
Discharge: HOME | End: 2023-03-04
Attending: EMERGENCY MEDICINE | Admitting: EMERGENCY MEDICINE
Payer: COMMERCIAL

## 2023-03-04 ENCOUNTER — APPOINTMENT (OUTPATIENT)
Dept: ULTRASOUND IMAGING | Facility: HOSPITAL | Age: 33
End: 2023-03-04
Payer: COMMERCIAL

## 2023-03-04 VITALS
DIASTOLIC BLOOD PRESSURE: 86 MMHG | SYSTOLIC BLOOD PRESSURE: 120 MMHG | OXYGEN SATURATION: 99 % | BODY MASS INDEX: 23.34 KG/M2 | HEIGHT: 60 IN | WEIGHT: 118.9 LBS | RESPIRATION RATE: 16 BRPM | TEMPERATURE: 97.5 F | HEART RATE: 106 BPM

## 2023-03-04 DIAGNOSIS — K74.3 PRIMARY BILIARY CIRRHOSIS: Primary | ICD-10-CM

## 2023-03-04 LAB
ALBUMIN SERPL-MCNC: 3.6 G/DL (ref 3.5–5.2)
ALBUMIN/GLOB SERPL: 1.8 G/DL
ALP SERPL-CCNC: 281 U/L (ref 39–117)
ALT SERPL W P-5'-P-CCNC: 77 U/L (ref 1–33)
ANION GAP SERPL CALCULATED.3IONS-SCNC: 11.7 MMOL/L (ref 5–15)
APTT PPP: 26.2 SECONDS (ref 24.3–38.1)
AST SERPL-CCNC: 54 U/L (ref 1–32)
BASOPHILS # BLD AUTO: 0.01 10*3/MM3 (ref 0–0.2)
BASOPHILS NFR BLD AUTO: 0.3 % (ref 0–1.5)
BILIRUB SERPL-MCNC: 0.4 MG/DL (ref 0–1.2)
BILIRUB UR QL STRIP: NEGATIVE
BUN SERPL-MCNC: 8 MG/DL (ref 6–20)
BUN/CREAT SERPL: 17.4 (ref 7–25)
CALCIUM SPEC-SCNC: 8.1 MG/DL (ref 8.6–10.5)
CHLORIDE SERPL-SCNC: 102 MMOL/L (ref 98–107)
CLARITY UR: CLEAR
CO2 SERPL-SCNC: 23.3 MMOL/L (ref 22–29)
COLOR UR: YELLOW
CREAT SERPL-MCNC: 0.46 MG/DL (ref 0.57–1)
DEPRECATED RDW RBC AUTO: 48.2 FL (ref 37–54)
EGFRCR SERPLBLD CKD-EPI 2021: 129 ML/MIN/1.73
EOSINOPHIL # BLD AUTO: 0.01 10*3/MM3 (ref 0–0.4)
EOSINOPHIL NFR BLD AUTO: 0.3 % (ref 0.3–6.2)
ERYTHROCYTE [DISTWIDTH] IN BLOOD BY AUTOMATED COUNT: 16.6 % (ref 12.3–15.4)
GLOBULIN UR ELPH-MCNC: 2 GM/DL
GLUCOSE SERPL-MCNC: 112 MG/DL (ref 65–99)
GLUCOSE UR STRIP-MCNC: NEGATIVE MG/DL
HCG SERPL QL: NEGATIVE
HCT VFR BLD AUTO: 30.7 % (ref 34–46.6)
HGB BLD-MCNC: 9.1 G/DL (ref 12–15.9)
HGB UR QL STRIP.AUTO: NEGATIVE
IMM GRANULOCYTES # BLD AUTO: 0.01 10*3/MM3 (ref 0–0.05)
IMM GRANULOCYTES NFR BLD AUTO: 0.3 % (ref 0–0.5)
INR PPP: 1.43 (ref 0.9–1.1)
KETONES UR QL STRIP: NEGATIVE
LDH SERPL-CCNC: 396 U/L (ref 135–214)
LEUKOCYTE ESTERASE UR QL STRIP.AUTO: NEGATIVE
LIPASE SERPL-CCNC: 22 U/L (ref 13–60)
LYMPHOCYTES # BLD AUTO: 0.47 10*3/MM3 (ref 0.7–3.1)
LYMPHOCYTES NFR BLD AUTO: 12.1 % (ref 19.6–45.3)
MAGNESIUM SERPL-MCNC: 1.8 MG/DL (ref 1.6–2.6)
MCH RBC QN AUTO: 24.1 PG (ref 26.6–33)
MCHC RBC AUTO-ENTMCNC: 29.6 G/DL (ref 31.5–35.7)
MCV RBC AUTO: 81.4 FL (ref 79–97)
MONOCYTES # BLD AUTO: 0.49 10*3/MM3 (ref 0.1–0.9)
MONOCYTES NFR BLD AUTO: 12.7 % (ref 5–12)
NEUTROPHILS NFR BLD AUTO: 2.88 10*3/MM3 (ref 1.7–7)
NEUTROPHILS NFR BLD AUTO: 74.3 % (ref 42.7–76)
NITRITE UR QL STRIP: NEGATIVE
NRBC BLD AUTO-RTO: 0 /100 WBC (ref 0–0.2)
PH UR STRIP.AUTO: 6.5 [PH] (ref 4.5–8)
PLATELET # BLD AUTO: 152 10*3/MM3 (ref 140–450)
PMV BLD AUTO: 11.7 FL (ref 6–12)
POTASSIUM SERPL-SCNC: 2.8 MMOL/L (ref 3.5–5.2)
PROT SERPL-MCNC: 5.6 G/DL (ref 6–8.5)
PROT UR QL STRIP: NEGATIVE
PROTHROMBIN TIME: 17.6 SECONDS (ref 12.1–15)
RBC # BLD AUTO: 3.77 10*6/MM3 (ref 3.77–5.28)
SODIUM SERPL-SCNC: 137 MMOL/L (ref 136–145)
SP GR UR STRIP: 1.02 (ref 1–1.03)
UROBILINOGEN UR QL STRIP: NORMAL
WBC NRBC COR # BLD: 3.87 10*3/MM3 (ref 3.4–10.8)

## 2023-03-04 PROCEDURE — 96374 THER/PROPH/DIAG INJ IV PUSH: CPT

## 2023-03-04 PROCEDURE — 85610 PROTHROMBIN TIME: CPT | Performed by: EMERGENCY MEDICINE

## 2023-03-04 PROCEDURE — 85730 THROMBOPLASTIN TIME PARTIAL: CPT | Performed by: EMERGENCY MEDICINE

## 2023-03-04 PROCEDURE — 25010000002 ONDANSETRON PER 1 MG: Performed by: EMERGENCY MEDICINE

## 2023-03-04 PROCEDURE — 81003 URINALYSIS AUTO W/O SCOPE: CPT | Performed by: EMERGENCY MEDICINE

## 2023-03-04 PROCEDURE — 83690 ASSAY OF LIPASE: CPT | Performed by: EMERGENCY MEDICINE

## 2023-03-04 PROCEDURE — 80053 COMPREHEN METABOLIC PANEL: CPT | Performed by: EMERGENCY MEDICINE

## 2023-03-04 PROCEDURE — 83615 LACTATE (LD) (LDH) ENZYME: CPT | Performed by: EMERGENCY MEDICINE

## 2023-03-04 PROCEDURE — 85025 COMPLETE CBC W/AUTO DIFF WBC: CPT | Performed by: EMERGENCY MEDICINE

## 2023-03-04 PROCEDURE — 76705 ECHO EXAM OF ABDOMEN: CPT

## 2023-03-04 PROCEDURE — 84703 CHORIONIC GONADOTROPIN ASSAY: CPT | Performed by: EMERGENCY MEDICINE

## 2023-03-04 PROCEDURE — 83735 ASSAY OF MAGNESIUM: CPT | Performed by: EMERGENCY MEDICINE

## 2023-03-04 PROCEDURE — 99283 EMERGENCY DEPT VISIT LOW MDM: CPT

## 2023-03-04 RX ORDER — ONDANSETRON 2 MG/ML
8 INJECTION INTRAMUSCULAR; INTRAVENOUS ONCE
Status: COMPLETED | OUTPATIENT
Start: 2023-03-04 | End: 2023-03-04

## 2023-03-04 RX ORDER — SODIUM CHLORIDE 0.9 % (FLUSH) 0.9 %
10 SYRINGE (ML) INJECTION AS NEEDED
Status: DISCONTINUED | OUTPATIENT
Start: 2023-03-04 | End: 2023-03-04 | Stop reason: HOSPADM

## 2023-03-04 RX ORDER — SPIRONOLACTONE 25 MG/1
25 TABLET ORAL DAILY
Qty: 30 TABLET | Refills: 0 | Status: SHIPPED | OUTPATIENT
Start: 2023-03-04

## 2023-03-04 RX ORDER — POTASSIUM CHLORIDE 750 MG/1
10 TABLET, FILM COATED, EXTENDED RELEASE ORAL DAILY
Qty: 7 TABLET | Refills: 0 | Status: SHIPPED | OUTPATIENT
Start: 2023-03-04 | End: 2023-03-11

## 2023-03-04 RX ORDER — POTASSIUM CHLORIDE 20 MEQ/1
40 TABLET, EXTENDED RELEASE ORAL ONCE
Status: COMPLETED | OUTPATIENT
Start: 2023-03-04 | End: 2023-03-04

## 2023-03-04 RX ADMIN — POTASSIUM CHLORIDE 40 MEQ: 1500 TABLET, EXTENDED RELEASE ORAL at 13:27

## 2023-03-04 RX ADMIN — ONDANSETRON 8 MG: 2 INJECTION INTRAMUSCULAR; INTRAVENOUS at 13:27

## 2023-03-04 NOTE — ED PROVIDER NOTES
"Subjective     History provided by:  Patient and medical records    History of Present Illness    · Chief complaint: Abdominal pain and distention    · Location: Pain in the upper abdomen.  The encounter diagnosis was Primary biliary cirrhosis (HCC).  US Abdomen Limited   Final Result      1. Cirrhosis with perihepatic ascites.   2. Nonspecific gallbladder wall thickening, likely related to hepatic dysfunction but should be correlated with laboratory data. No evidence of cholelithiasis. Common bile duct cannot be visualized or assessed.   3. Splenomegaly.   4. Pelvic caliectasis of the central right renal collecting system.      Signer Name: Mao Molina MD    Signed: 3/4/2023 1:00 PM    Workstation Name: [email or web address]     [facility]        Final diagnoses:   Primary biliary cirrhosis (HCC)     · Generalized abdominal distention.    · Quality/Severity: Pain is moderate and constant.  Abdomen distention is moderate.    · Timing/Onset: Originally developed last December (4 months ago) then subsided mid January.  She redeveloped abdominal discomfort and abdominal distention about 2 weeks ago.    · Modifying Factors: Patient was diagnosed with primary biliary cirrhosis.    · Associated symptoms: Associated nausea without vomiting.    · Narrative: The patient is a 32-year-old white female with a history of primary biliary cirrhosis diagnosed with an MRI 2/10/2023.  She had a HIDA scan on 1/9/2022 that showed a low ejection fraction of 9% but was otherwise normal.  An ultrasound of the abdomen 1/19/2022 was interpreted as no ascites.  The patient has a history of no alcohol abuse.  She quit smoking in 2014.  No history of drug abuse.  She is a stay-at-home mom.  Her last menstrual period was 2/5/2023 and practices the \"pullout method\" for birth control.  She was evaluated by LAURENCE Hooks, on 2/15/2023 and is referring the patient to the [facility] for liver work-up.  " "The patient's not been seen yet by the [facility] with a new appointment scheduled via telemedicine for 4/3/2023    Review of Systems  Past Medical History:   Diagnosis Date   • 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HCC)     follows w/Dr at    • Arthritis    • Chronic sore throat     advised by PCP to have sleep study   • Esophageal varices (HCC)    • GERD (gastroesophageal reflux disease)     d/t steroids   • ILD (interstitial lung disease) (HCC)     had normal PFTs, to have CT   • Kidney stones     history   • Migraine    • Myositis    • Raynaud's syndrome    • Shingles      /86   Pulse 106   Temp 97.5 °F (36.4 °C) (Oral)   Resp 16   Ht 152.4 cm (60\")   Wt 53.9 kg (118 lb 14.4 oz)   LMP  (Exact Date)   SpO2 99%   BMI 23.22 kg/m²     Past Medical History:   Diagnosis Date   • 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HCC)     follows w/Dr at    • Arthritis    • Chronic sore throat     advised by PCP to have sleep study   • Esophageal varices (HCC)    • GERD (gastroesophageal reflux disease)     d/t steroids   • ILD (interstitial lung disease) (HCC)     had normal PFTs, to have CT   • Kidney stones     history   • Migraine    • Myositis    • Raynaud's syndrome    • Shingles        Allergies   Allergen Reactions   • Sumatriptan Nausea Only and Unknown - High Severity     Chest pain  Other reaction(s): Nausea Only, Unknown  Chest pain  Chest pain         Past Surgical History:   Procedure Laterality Date   • ADENOIDECTOMY     • COLONOSCOPY W/ POLYPECTOMY N/A 12/1/2022    Procedure: Colonoscopy with polypectomy;  Surgeon: Soni Funes DO;  Location: [facility];  Service: Gastroenterology;  Laterality: N/A;  sigmoid polyp x1  rectal polyp x1   • D & C HYSTEROSCOPY N/A 6/10/2021    Procedure: diagnostic hysteroscopy, dilatation and curettage;  Surgeon: Pablito Vo MD;  Location: [facility];  Service: Obstetrics/Gynecology;  Laterality: N/A;   • EAR TUBES     • ENDOSCOPY N/A 12/1/2022    Procedure: " Esophagogastroduodenoscopy with biopsy ;  Surgeon: Soni Funes DO;  Location: [facility] OR;  Service: Gastroenterology;  Laterality: N/A;  nereida test  esophageal varices  gastritis   • EYE SURGERY Right     eyelid reconstruction   • MUSCLE BIOPSY  2014    thigh right   • SKIN BIOPSY      right thigh   • VENOUS ACCESS DEVICE (PORT) INSERTION N/A 2018    Procedure: INSERTION VENOUS ACCESS DEVICE;  Surgeon: Bo Thomas MD;  Location:  [facility] OR;  Service: General   • VENOUS ACCESS DEVICE (PORT) REMOVAL Right 2020    Procedure: REMOVAL VENOUS ACCESS DEVICE;  Surgeon: Bo Thomas MD;  Location: [facility] OR;  Service: General;  Laterality: Right;  REMOVAL VENOUS ACCESS DEVICE       Family History   Problem Relation Age of Onset   • Hyperlipidemia Mother    • Hypertension Mother    • Lung cancer Father    • Cancer Father    • Throat cancer Father    • Cervical cancer Brother    • Malig Hyperthermia Neg Hx        Social History     Socioeconomic History   • Marital status:      Spouse name: [place]   Tobacco Use   • Smoking status: Former     Packs/day: 0.50     Years: 15.00     Pack years: 7.50     Types: Cigarettes     Quit date: 3/13/2014     Years since quittin.9   • Smokeless tobacco: Never   Vaping Use   • Vaping Use: Never used   Substance and Sexual Activity   • Alcohol use: No   • Drug use: Never   • Sexual activity: Defer           Objective   Physical Exam  Vitals and nursing note reviewed.   Constitutional:       General: She is not in acute distress.     Appearance: She is well-developed and normal weight. She is not ill-appearing, toxic-appearing or diaphoretic.      Comments: The patient appears in no acute distress and does not appear ill.  Review of her vital signs: She is afebrile with a temperature of 97.5, tachycardic with a heart rate of 117, blood pressure normal 128/89, respirations normal 16 with a normal room air oxygen saturation of 100%.   HENT:      Head: Normocephalic  and atraumatic.      Nose: Nose normal.      Mouth/Throat:      Mouth: Mucous membranes are moist.      Pharynx: Oropharynx is clear.   Eyes:      General: No scleral icterus.        Right eye: No discharge.         Left eye: No discharge.      Pupils: Pupils are equal, round, and reactive to light.   Neck:      Thyroid: No thyromegaly.      Vascular: No JVD.   Cardiovascular:      Rate and Rhythm: Normal rate and regular rhythm.      Heart sounds: Normal heart sounds. No murmur heard.  Pulmonary:      Effort: Pulmonary effort is normal.      Breath sounds: Normal breath sounds. No wheezing, rhonchi or rales.   Chest:      Chest wall: No tenderness.   Abdominal:      General: Abdomen is protuberant. Bowel sounds are increased. There is distension.      Palpations: Abdomen is soft.      Tenderness: There is abdominal tenderness ( Subjective) in the right upper quadrant, epigastric area and left upper quadrant. There is no right CVA tenderness, left CVA tenderness, guarding or rebound. Negative signs include Maguire's sign, Rovsing's sign and McBurney's sign.   Musculoskeletal:         General: No tenderness or deformity. Normal range of motion.      Cervical back: Normal range of motion and neck supple.   Lymphadenopathy:      Cervical: No cervical adenopathy.   Skin:     General: Skin is warm and dry.      Capillary Refill: Capillary refill takes less than 2 seconds.      Coloration: Skin is not jaundiced or pale.      Findings: No rash.   Neurological:      General: No focal deficit present.      Mental Status: She is alert and oriented to person, place, and time.      Cranial Nerves: No cranial nerve deficit.      Coordination: Coordination normal.      Comments: No focal motor sensory deficit   Psychiatric:         Mood and Affect: Mood normal.         Behavior: Behavior normal.         Thought Content: Thought content normal.         Judgment: Judgment normal.         Procedures           ED Course  ED Course as  of 03/04/23 1624   Sat Mar 04, 2023   1617 Review the patient's test results: The patient's CBC had a normal white count of 3.87 with a normal differential.  She was anemic with a hemoglobin 9.1 and hematocrit 30.7 which is baseline for the patient.  Platelets were normal.  CMP had a normal sodium of 137 with a low potassium of 2.8 and a normal glucose of 112.  BUN was normal at 8 with a normal creatinine of 0.46 and a normal .  The patient had mild elevations of her transaminases with a normal total bilirubin.  Beta-hCG was negative.  PT was elevated mildly at 17.6 with a normal PTT.  LDH was elevated at 400 which is actually low in comparison to previous values.  Urinalysis negative for blood or infection. [TP]   1622 Ultrasound of the abdomen shows cirrhosis with perihepatic ascites.  There is no ascites seen in the other areas of the abdomen.  Nonspecific gallbladder wall thickening likely related to hepatic dysfunction.  No cholelithiasis.  Common duct could not be visualized. [TP]   1622 13: 40 patient discussed with Dr. Espana, GI on-call, who recommended the patient be at prescribed Aldactone 25 mg daily.  The patient is to keep her appointment with the [facility] hepatic clinic. [TP]   1623 The patient was administered potassium chloride 40 mill equivalents p.o. in the ER.  She was administered Zofran 8 mg IV for nausea.  She was discharged with a prescription for Klor-Con 10 and Aldactone 25 mg daily. [TP]      ED Course User Index  [TP] Bo Jerome MD                                           Medical Decision Making  My differential diagnosis for abdominal pain includes but is not limited to:  Gastritis, gastroenteritis, peptic ulcer disease, GERD, esophageal perforation, acute appendicitis, mesenteric adenitis, Meckel’s diverticulum, epiploic appendagitis, diverticulitis, colon cancer, ulcerative colitis, Crohn’s disease, intussusception, small bowel obstruction, adhesions, ischemic  bowel, perforated viscus, ileus, obstipation, biliary colic, cholecystitis, cholelithiasis, Deng-Flaco Hank, hepatitis, pancreatitis, common bile duct obstruction, cholangitis, bile leak, splenic trauma, splenic rupture, splenic infarction, splenic abscess, abdominal abscess, ascites, spontaneous bacterial peritonitis, hernia, UTI, cystitis, prostatitis, ureterolithiasis, urinary obstruction, AAA, myocardial infarction, pneumonia, cancer, porphyria, DKA, medications, sickle cell, viral syndrome, zoster    Primary biliary cirrhosis (HCC): acute illness or injury  Amount and/or Complexity of Data Reviewed  Labs: ordered. Decision-making details documented in ED Course.  Radiology: ordered. Decision-making details documented in ED Course.  Discussion of management or test interpretation with external provider(s): Details documented in the ED course.    Risk  Prescription drug management.          Final diagnoses:   Primary biliary cirrhosis (HCC)       ED Disposition  ED Disposition     ED Disposition   Discharge    Condition   Stable    Comment   --             Silvio Harris MD  [address]  [address]  [place] [address] [phone]    Schedule an appointment as soon as possible for a visit   next available     [facility]  [address], [facility], [facility] [address] [phone]  Call   To see if you can be seen sooner.         Medication List      New Prescriptions    potassium chloride 10 MEQ CR tablet  Take 1 tablet by mouth Daily for 7 days.     spironolactone 25 MG tablet  Commonly known as: ALDACTONE  Take 1 tablet by mouth Daily.           Where to Get Your Medications      These medications were sent to [id number] - [place] - [address] - [phone]  - [phone]  [address] [facility] [place] [address]    Phone: [phone]   · potassium chloride 10 MEQ CR tablet  · spironolactone 25 MG tablet         Labs Reviewed    COMPREHENSIVE METABOLIC PANEL - Abnormal; Notable for the following components:       Result Value    Glucose 112 (*)     Creatinine 0.46 (*)     Potassium 2.8 (*)     Calcium 8.1 (*)     Total Protein 5.6 (*)     ALT (SGPT) 77 (*)     AST (SGOT) 54 (*)     Alkaline Phosphatase 281 (*)     All other components within normal limits    Narrative:     GFR Normal >60  Chronic Kidney Disease <60  Kidney Failure <15     LACTATE DEHYDROGENASE - Abnormal; Notable for the following components:     (*)     All other components within normal limits   PROTIME-INR - Abnormal; Notable for the following components:    Protime 17.6 (*)     INR 1.43 (*)     All other components within normal limits    Narrative:     Therapeutic Ranges for INR: 2.0-3.0 (PT 20-30)                              2.5-3.5 (PT 25-34)   CBC WITH AUTO DIFFERENTIAL - Abnormal; Notable for the following components:    Hemoglobin 9.1 (*)     Hematocrit 30.7 (*)     MCH 24.1 (*)     MCHC 29.6 (*)     RDW 16.6 (*)     Lymphocyte % 12.1 (*)     Monocyte % 12.7 (*)     Lymphocytes, Absolute 0.47 (*)     All other components within normal limits   LIPASE - Normal   HCG, SERUM, QUALITATIVE - Normal   URINALYSIS W/ MICROSCOPIC IF INDICATED (NO CULTURE) - Normal    Narrative:     Urine microscopic not indicated.   APTT - Normal    Narrative:     PTT = The equivalent PTT values for the therapeutic range of heparin levels at 0.1 to 0.7 U/ml are 53 to 110 seconds.     MAGNESIUM - Normal   CBC AND DIFFERENTIAL    Narrative:     The following orders were created for panel order CBC & Differential.  Procedure                               Abnormality         Status                     ---------                               -----------         ------                     CBC Auto Differential[[id number]]        Abnormal            Final result                 Please view results for these tests on the individual orders.     US Abdomen Limited   Final Result      1.  Cirrhosis with perihepatic ascites.   2. Nonspecific gallbladder wall thickening, likely related to hepatic dysfunction but should be correlated with laboratory data. No evidence of cholelithiasis. Common bile duct cannot be visualized or assessed.   3. Splenomegaly.   4. Pelvic caliectasis of the central right renal collecting system.      Signer Name: Mao Molina MD    Signed: 3/4/2023 1:00 PM    Workstation Name: [email or web address]     [facility]             Medication List      New Prescriptions    potassium chloride 10 MEQ CR tablet  Take 1 tablet by mouth Daily for 7 days.     spironolactone 25 MG tablet  Commonly known as: ALDACTONE  Take 1 tablet by mouth Daily.           Where to Get Your Medications      These medications were sent to [facility] - [place] - [address] - [phone]  - [phone]   [address] [facility] [place] [address]    Phone: [phone]   · potassium chloride 10 MEQ CR tablet  · spironolactone 25 MG tablet              Bo Jerome MD  03/04/23 7522

## 2023-03-06 ENCOUNTER — TELEPHONE (OUTPATIENT)
Dept: GASTROENTEROLOGY | Facility: CLINIC | Age: 33
End: 2023-03-06
Payer: COMMERCIAL

## 2023-03-06 DIAGNOSIS — R19.7 DIARRHEA, UNSPECIFIED TYPE: Primary | ICD-10-CM

## 2023-03-06 RX ORDER — MONTELUKAST SODIUM 4 MG/1
2 TABLET, CHEWABLE ORAL 2 TIMES DAILY
Qty: 60 TABLET | Refills: 11 | Status: SHIPPED | OUTPATIENT
Start: 2023-03-06

## 2023-03-06 NOTE — TELEPHONE ENCOUNTER
Patient stated Colestipol is on back order at [facility].     [facility] [place] [facility]?

## 2023-03-06 NOTE — TELEPHONE ENCOUNTER
PT called and said she has appt scheduled with ALMA MCGOVERN on 4/3. It's a telehalth.    PT would like to request the ref be sent somewhere else, like [facility], to see if they can get her in sooner. She is in a lot of pain and has been back to the ER.    Also, he dr prescribed codeine for her pain to help out. She wants to know if it's ok for her to take it.    She says she wants to keep the [facility] appt not cancel it, but would like to be seen sooner.    Please call her back at [phone]

## 2023-04-04 ENCOUNTER — TELEPHONE (OUTPATIENT)
Dept: GASTROENTEROLOGY | Facility: CLINIC | Age: 33
End: 2023-04-04
Payer: COMMERCIAL

## 2023-07-03 PROBLEM — K74.3 PRIMARY BILIARY CIRRHOSIS: Status: ACTIVE | Noted: 2023-07-03

## 2023-07-03 PROBLEM — K59.04 CHRONIC IDIOPATHIC CONSTIPATION: Status: ACTIVE | Noted: 2023-07-03

## 2023-07-27 ENCOUNTER — PREP FOR SURGERY (OUTPATIENT)
Dept: OTHER | Facility: HOSPITAL | Age: 33
End: 2023-07-27
Payer: COMMERCIAL

## 2023-07-27 DIAGNOSIS — I85.00 ESOPHAGEAL VARICES WITHOUT BLEEDING, UNSPECIFIED ESOPHAGEAL VARICES TYPE: Primary | ICD-10-CM

## 2023-08-01 RX ORDER — OMEPRAZOLE 40 MG/1
40 CAPSULE, DELAYED RELEASE ORAL DAILY
Qty: 30 CAPSULE | Refills: 11 | Status: SHIPPED | OUTPATIENT
Start: 2023-08-01

## 2023-08-02 ENCOUNTER — CONSULT (OUTPATIENT)
Dept: ONCOLOGY | Facility: CLINIC | Age: 33
End: 2023-08-02
Payer: COMMERCIAL

## 2023-08-02 ENCOUNTER — APPOINTMENT (OUTPATIENT)
Dept: LAB | Facility: HOSPITAL | Age: 33
End: 2023-08-02
Payer: COMMERCIAL

## 2023-08-02 VITALS
HEIGHT: 62 IN | RESPIRATION RATE: 16 BRPM | HEART RATE: 93 BPM | OXYGEN SATURATION: 99 % | TEMPERATURE: 98.4 F | BODY MASS INDEX: 21.42 KG/M2 | WEIGHT: 116.4 LBS | DIASTOLIC BLOOD PRESSURE: 70 MMHG | SYSTOLIC BLOOD PRESSURE: 103 MMHG

## 2023-08-02 DIAGNOSIS — R16.1 SPLENOMEGALY: ICD-10-CM

## 2023-08-02 DIAGNOSIS — D64.9 ANEMIA, UNSPECIFIED TYPE: Primary | ICD-10-CM

## 2023-08-02 LAB
BASOPHILS # BLD AUTO: 0.01 10*3/MM3 (ref 0–0.2)
BASOPHILS NFR BLD AUTO: 0.2 % (ref 0–1.5)
DEPRECATED RDW RBC AUTO: 51.8 FL (ref 37–54)
EOSINOPHIL # BLD AUTO: 0.03 10*3/MM3 (ref 0–0.4)
EOSINOPHIL NFR BLD AUTO: 0.7 % (ref 0.3–6.2)
ERYTHROCYTE [DISTWIDTH] IN BLOOD BY AUTOMATED COUNT: 17.1 % (ref 12.3–15.4)
FERRITIN SERPL-MCNC: 18 NG/ML (ref 13–150)
FOLATE SERPL-MCNC: 4.1 NG/ML (ref 4.78–24.2)
HCT VFR BLD AUTO: 34.9 % (ref 34–46.6)
HGB BLD-MCNC: 9.9 G/DL (ref 12–15.9)
IMM GRANULOCYTES # BLD AUTO: 0.02 10*3/MM3 (ref 0–0.05)
IMM GRANULOCYTES NFR BLD AUTO: 0.5 % (ref 0–0.5)
IRON 24H UR-MRATE: 42 MCG/DL (ref 37–145)
IRON SATN MFR SERPL: 9 % (ref 20–50)
LDH SERPL-CCNC: 375 U/L (ref 135–214)
LYMPHOCYTES # BLD AUTO: 0.5 10*3/MM3 (ref 0.7–3.1)
LYMPHOCYTES NFR BLD AUTO: 11.7 % (ref 19.6–45.3)
MCH RBC QN AUTO: 23.7 PG (ref 26.6–33)
MCHC RBC AUTO-ENTMCNC: 28.4 G/DL (ref 31.5–35.7)
MCV RBC AUTO: 83.5 FL (ref 79–97)
MONOCYTES # BLD AUTO: 0.61 10*3/MM3 (ref 0.1–0.9)
MONOCYTES NFR BLD AUTO: 14.3 % (ref 5–12)
NEUTROPHILS NFR BLD AUTO: 3.09 10*3/MM3 (ref 1.7–7)
NEUTROPHILS NFR BLD AUTO: 72.6 % (ref 42.7–76)
NRBC BLD AUTO-RTO: 0 /100 WBC (ref 0–0.2)
PLATELET # BLD AUTO: 152 10*3/MM3 (ref 140–450)
PMV BLD AUTO: 11.8 FL (ref 6–12)
RBC # BLD AUTO: 4.18 10*6/MM3 (ref 3.77–5.28)
TIBC SERPL-MCNC: 443 MCG/DL (ref 298–536)
UIBC SERPL-MCNC: 401 MCG/DL (ref 112–346)
VIT B12 BLD-MCNC: 563 PG/ML (ref 211–946)
WBC NRBC COR # BLD: 4.26 10*3/MM3 (ref 3.4–10.8)

## 2023-08-02 PROCEDURE — 99244 OFF/OP CNSLTJ NEW/EST MOD 40: CPT | Performed by: INTERNAL MEDICINE

## 2023-08-02 PROCEDURE — 86334 IMMUNOFIX E-PHORESIS SERUM: CPT | Performed by: INTERNAL MEDICINE

## 2023-08-02 PROCEDURE — 85025 COMPLETE CBC W/AUTO DIFF WBC: CPT | Performed by: INTERNAL MEDICINE

## 2023-08-02 PROCEDURE — 82607 VITAMIN B-12: CPT | Performed by: INTERNAL MEDICINE

## 2023-08-02 PROCEDURE — 84165 PROTEIN E-PHORESIS SERUM: CPT | Performed by: INTERNAL MEDICINE

## 2023-08-02 PROCEDURE — 83540 ASSAY OF IRON: CPT | Performed by: INTERNAL MEDICINE

## 2023-08-02 PROCEDURE — 83550 IRON BINDING TEST: CPT | Performed by: INTERNAL MEDICINE

## 2023-08-02 PROCEDURE — 84155 ASSAY OF PROTEIN SERUM: CPT | Performed by: INTERNAL MEDICINE

## 2023-08-02 PROCEDURE — 36415 COLL VENOUS BLD VENIPUNCTURE: CPT | Performed by: INTERNAL MEDICINE

## 2023-08-02 PROCEDURE — 1125F AMNT PAIN NOTED PAIN PRSNT: CPT | Performed by: INTERNAL MEDICINE

## 2023-08-02 PROCEDURE — 82728 ASSAY OF FERRITIN: CPT | Performed by: INTERNAL MEDICINE

## 2023-08-02 PROCEDURE — 83615 LACTATE (LD) (LDH) ENZYME: CPT | Performed by: INTERNAL MEDICINE

## 2023-08-02 PROCEDURE — 82746 ASSAY OF FOLIC ACID SERUM: CPT | Performed by: INTERNAL MEDICINE

## 2023-08-02 PROCEDURE — 82784 ASSAY IGA/IGD/IGG/IGM EACH: CPT | Performed by: INTERNAL MEDICINE

## 2023-08-03 ENCOUNTER — PATIENT ROUNDING (BHMG ONLY) (OUTPATIENT)
Dept: ONCOLOGY | Facility: CLINIC | Age: 33
End: 2023-08-03
Payer: COMMERCIAL

## 2023-08-03 LAB
ALBUMIN SERPL ELPH-MCNC: 3.1 G/DL (ref 2.9–4.4)
ALBUMIN/GLOB SERPL: 1.3 {RATIO} (ref 0.7–1.7)
ALPHA1 GLOB SERPL ELPH-MCNC: 0.3 G/DL (ref 0–0.4)
ALPHA2 GLOB SERPL ELPH-MCNC: 0.6 G/DL (ref 0.4–1)
B-GLOBULIN SERPL ELPH-MCNC: 1 G/DL (ref 0.7–1.3)
GAMMA GLOB SERPL ELPH-MCNC: 0.4 G/DL (ref 0.4–1.8)
GLOBULIN SER CALC-MCNC: 2.3 G/DL (ref 2.2–3.9)
LABORATORY COMMENT REPORT: ABNORMAL
M PROTEIN SERPL ELPH-MCNC: ABNORMAL G/DL
PROT SERPL-MCNC: 5.4 G/DL (ref 6–8.5)

## 2023-08-04 ENCOUNTER — TELEPHONE (OUTPATIENT)
Dept: ONCOLOGY | Facility: CLINIC | Age: 33
End: 2023-08-04
Payer: COMMERCIAL

## 2023-08-04 LAB
IGA SERPL-MCNC: 75 MG/DL (ref 87–352)
IGG SERPL-MCNC: 315 MG/DL (ref 586–1602)
IGM SERPL-MCNC: 10 MG/DL (ref 26–217)
PROT PATTERN SERPL IFE-IMP: ABNORMAL

## 2023-08-04 RX ORDER — FOLIC ACID 1 MG/1
1 TABLET ORAL DAILY
Qty: 30 TABLET | Refills: 3 | Status: SHIPPED | OUTPATIENT
Start: 2023-08-04

## 2023-08-07 ENCOUNTER — TELEPHONE (OUTPATIENT)
Dept: GASTROENTEROLOGY | Facility: CLINIC | Age: 33
End: 2023-08-07
Payer: COMMERCIAL

## 2023-08-07 NOTE — TELEPHONE ENCOUNTER
PT WANTED TO SPEAK TO PAULY ABOUT HER MEDS.    SHE STATED SHE TRIED ADDING THE DAILY FIBER TO NO SUCCESS.    SHE HAS SAMPLES FOR LINZESS. SHE WANTS TO KNOW IF SHE CAN START TAKING THOSE. SHE STATED SHE HAS ENOUGH FOR TWO WEEKS.     SHE WANTS TO TAKE THAT FOR TWO WEEKS AND THEN REPORT  BACK,    PLEASE CALL HER BACK [phone]

## 2023-08-08 NOTE — TELEPHONE ENCOUNTER
Called patient and let her know that she can start Linzess 145 mcg with Miralax daily if no BM.   Patient gave verbal understanding.   Patient will call back in two weeks if medication is working well for rx.

## 2023-08-10 ENCOUNTER — TRANSCRIBE ORDERS (OUTPATIENT)
Dept: ADMINISTRATIVE | Facility: HOSPITAL | Age: 33
End: 2023-08-10
Payer: COMMERCIAL

## 2023-08-10 ENCOUNTER — LAB (OUTPATIENT)
Dept: LAB | Facility: HOSPITAL | Age: 33
End: 2023-08-10

## 2023-08-10 ENCOUNTER — INFUSION (OUTPATIENT)
Dept: ONCOLOGY | Facility: HOSPITAL | Age: 33
End: 2023-08-10
Payer: COMMERCIAL

## 2023-08-10 VITALS
OXYGEN SATURATION: 97 % | TEMPERATURE: 98.2 F | HEART RATE: 90 BPM | SYSTOLIC BLOOD PRESSURE: 109 MMHG | RESPIRATION RATE: 18 BRPM | DIASTOLIC BLOOD PRESSURE: 75 MMHG

## 2023-08-10 DIAGNOSIS — K74.00 HEPATIC FIBROSIS: Primary | ICD-10-CM

## 2023-08-10 DIAGNOSIS — K74.3 CHOLANGITIC CIRRHOSIS: ICD-10-CM

## 2023-08-10 DIAGNOSIS — D64.9 ANEMIA, UNSPECIFIED TYPE: Primary | ICD-10-CM

## 2023-08-10 DIAGNOSIS — K74.00 HEPATIC FIBROSIS: ICD-10-CM

## 2023-08-10 LAB
25(OH)D3 SERPL-MCNC: 21.3 NG/ML (ref 30–100)
ALBUMIN SERPL-MCNC: 3.6 G/DL (ref 3.5–5.2)
ALBUMIN/GLOB SERPL: 1.8 G/DL
ALP SERPL-CCNC: 224 U/L (ref 39–117)
ALT SERPL W P-5'-P-CCNC: 65 U/L (ref 1–33)
ANION GAP SERPL CALCULATED.3IONS-SCNC: 11.1 MMOL/L (ref 5–15)
AST SERPL-CCNC: 40 U/L (ref 1–32)
BASOPHILS # BLD AUTO: 0.01 10*3/MM3 (ref 0–0.2)
BASOPHILS NFR BLD AUTO: 0.2 % (ref 0–1.5)
BILIRUB SERPL-MCNC: 0.5 MG/DL (ref 0–1.2)
BUN SERPL-MCNC: 5 MG/DL (ref 6–20)
BUN/CREAT SERPL: 10.6 (ref 7–25)
CALCIUM SPEC-SCNC: 8.2 MG/DL (ref 8.6–10.5)
CHLORIDE SERPL-SCNC: 107 MMOL/L (ref 98–107)
CO2 SERPL-SCNC: 21.9 MMOL/L (ref 22–29)
CREAT SERPL-MCNC: 0.47 MG/DL (ref 0.57–1)
DEPRECATED RDW RBC AUTO: 44.8 FL (ref 37–54)
EGFRCR SERPLBLD CKD-EPI 2021: 127.5 ML/MIN/1.73
EOSINOPHIL # BLD AUTO: 0.02 10*3/MM3 (ref 0–0.4)
EOSINOPHIL NFR BLD AUTO: 0.5 % (ref 0.3–6.2)
ERYTHROCYTE [DISTWIDTH] IN BLOOD BY AUTOMATED COUNT: 15.7 % (ref 12.3–15.4)
GLOBULIN UR ELPH-MCNC: 2 GM/DL
GLUCOSE SERPL-MCNC: 70 MG/DL (ref 65–99)
HCT VFR BLD AUTO: 32.4 % (ref 34–46.6)
HGB BLD-MCNC: 9.6 G/DL (ref 12–15.9)
IMM GRANULOCYTES # BLD AUTO: 0.01 10*3/MM3 (ref 0–0.05)
IMM GRANULOCYTES NFR BLD AUTO: 0.2 % (ref 0–0.5)
INR PPP: 1.35 (ref 0.9–1.1)
LYMPHOCYTES # BLD AUTO: 0.43 10*3/MM3 (ref 0.7–3.1)
LYMPHOCYTES NFR BLD AUTO: 10.4 % (ref 19.6–45.3)
MCH RBC QN AUTO: 23.6 PG (ref 26.6–33)
MCHC RBC AUTO-ENTMCNC: 29.6 G/DL (ref 31.5–35.7)
MCV RBC AUTO: 79.8 FL (ref 79–97)
MONOCYTES # BLD AUTO: 0.62 10*3/MM3 (ref 0.1–0.9)
MONOCYTES NFR BLD AUTO: 14.9 % (ref 5–12)
NEUTROPHILS NFR BLD AUTO: 3.06 10*3/MM3 (ref 1.7–7)
NEUTROPHILS NFR BLD AUTO: 73.8 % (ref 42.7–76)
NRBC BLD AUTO-RTO: 0 /100 WBC (ref 0–0.2)
PLATELET # BLD AUTO: 158 10*3/MM3 (ref 140–450)
PMV BLD AUTO: 11.9 FL (ref 6–12)
POTASSIUM SERPL-SCNC: 3.7 MMOL/L (ref 3.5–5.2)
PROT SERPL-MCNC: 5.6 G/DL (ref 6–8.5)
PROTHROMBIN TIME: 16.6 SECONDS (ref 12.1–15)
RBC # BLD AUTO: 4.06 10*6/MM3 (ref 3.77–5.28)
SODIUM SERPL-SCNC: 140 MMOL/L (ref 136–145)
WBC NRBC COR # BLD: 4.15 10*3/MM3 (ref 3.4–10.8)

## 2023-08-10 PROCEDURE — 25010000002 IRON SUCROSE PER 1 MG: Performed by: INTERNAL MEDICINE

## 2023-08-10 PROCEDURE — 85610 PROTHROMBIN TIME: CPT

## 2023-08-10 PROCEDURE — 36415 COLL VENOUS BLD VENIPUNCTURE: CPT

## 2023-08-10 PROCEDURE — 96374 THER/PROPH/DIAG INJ IV PUSH: CPT

## 2023-08-10 PROCEDURE — 80053 COMPREHEN METABOLIC PANEL: CPT

## 2023-08-10 PROCEDURE — 82306 VITAMIN D 25 HYDROXY: CPT

## 2023-08-10 PROCEDURE — 85025 COMPLETE CBC W/AUTO DIFF WBC: CPT

## 2023-08-10 PROCEDURE — 82105 ALPHA-FETOPROTEIN SERUM: CPT

## 2023-08-10 PROCEDURE — 63710000001 DIPHENHYDRAMINE PER 50 MG: Performed by: INTERNAL MEDICINE

## 2023-08-10 RX ORDER — SODIUM CHLORIDE 9 MG/ML
250 INJECTION, SOLUTION INTRAVENOUS ONCE
Status: COMPLETED | OUTPATIENT
Start: 2023-08-10 | End: 2023-08-10

## 2023-08-10 RX ORDER — ACETAMINOPHEN 325 MG/1
650 TABLET ORAL ONCE
Status: COMPLETED | OUTPATIENT
Start: 2023-08-10 | End: 2023-08-10

## 2023-08-10 RX ORDER — DIPHENHYDRAMINE HCL 25 MG
25 CAPSULE ORAL ONCE
Status: COMPLETED | OUTPATIENT
Start: 2023-08-10 | End: 2023-08-10

## 2023-08-10 RX ADMIN — IRON SUCROSE 200 MG: 20 INJECTION, SOLUTION INTRAVENOUS at 11:49

## 2023-08-10 RX ADMIN — ACETAMINOPHEN 650 MG: 325 TABLET ORAL at 11:14

## 2023-08-10 RX ADMIN — DIPHENHYDRAMINE HYDROCHLORIDE 25 MG: 25 CAPSULE ORAL at 11:14

## 2023-08-10 RX ADMIN — SODIUM CHLORIDE 250 ML: 9 INJECTION, SOLUTION INTRAVENOUS at 11:23

## 2023-08-17 ENCOUNTER — INFUSION (OUTPATIENT)
Dept: ONCOLOGY | Facility: HOSPITAL | Age: 33
End: 2023-08-17
Payer: COMMERCIAL

## 2023-08-17 VITALS
OXYGEN SATURATION: 99 % | SYSTOLIC BLOOD PRESSURE: 94 MMHG | HEART RATE: 102 BPM | DIASTOLIC BLOOD PRESSURE: 65 MMHG | TEMPERATURE: 97 F

## 2023-08-17 DIAGNOSIS — D64.9 ANEMIA, UNSPECIFIED TYPE: Primary | ICD-10-CM

## 2023-08-17 PROCEDURE — 96374 THER/PROPH/DIAG INJ IV PUSH: CPT

## 2023-08-17 PROCEDURE — 25010000002 IRON SUCROSE PER 1 MG: Performed by: INTERNAL MEDICINE

## 2023-08-17 PROCEDURE — 63710000001 DIPHENHYDRAMINE PER 50 MG: Performed by: INTERNAL MEDICINE

## 2023-08-17 RX ORDER — HYDROXYZINE HYDROCHLORIDE 25 MG/1
TABLET, FILM COATED ORAL
COMMUNITY
Start: 2023-08-07

## 2023-08-17 RX ORDER — ERGOCALCIFEROL 1.25 MG/1
CAPSULE ORAL
COMMUNITY
Start: 2023-08-14

## 2023-08-17 RX ORDER — DIPHENHYDRAMINE HCL 25 MG
25 CAPSULE ORAL ONCE
Status: COMPLETED | OUTPATIENT
Start: 2023-08-17 | End: 2023-08-17

## 2023-08-17 RX ORDER — SODIUM CHLORIDE 9 MG/ML
250 INJECTION, SOLUTION INTRAVENOUS ONCE
Status: COMPLETED | OUTPATIENT
Start: 2023-08-17 | End: 2023-08-17

## 2023-08-17 RX ORDER — ACETAMINOPHEN 325 MG/1
650 TABLET ORAL ONCE
Status: COMPLETED | OUTPATIENT
Start: 2023-08-17 | End: 2023-08-17

## 2023-08-17 RX ADMIN — IRON SUCROSE 200 MG: 20 INJECTION, SOLUTION INTRAVENOUS at 12:06

## 2023-08-17 RX ADMIN — DIPHENHYDRAMINE HYDROCHLORIDE 25 MG: 25 CAPSULE ORAL at 11:36

## 2023-08-17 RX ADMIN — ACETAMINOPHEN 650 MG: 325 TABLET ORAL at 11:36

## 2023-08-17 RX ADMIN — SODIUM CHLORIDE 250 ML: 9 INJECTION, SOLUTION INTRAVENOUS at 11:30

## 2023-08-18 LAB
AFP INTERP SERPL-IMP: NORMAL
AFP INTERP SERPL-IMP: NORMAL
AFP MOM SERPL: NORMAL
AFP SERPL-MCNC: 0.8 NG/ML
AGE AT DELIVERY: 35.5 YR
GA METHOD: NORMAL
GA: NORMAL WEEKS
IDDM PATIENT QL: NORMAL
LABORATORY COMMENT REPORT: NORMAL
MULTIPLE PREGNANCY: NORMAL
NEURAL TUBE DEFECT RISK FETUS: NORMAL %
RESULT: NORMAL

## 2023-08-24 ENCOUNTER — INFUSION (OUTPATIENT)
Dept: ONCOLOGY | Facility: HOSPITAL | Age: 33
End: 2023-08-24
Payer: COMMERCIAL

## 2023-08-24 VITALS
DIASTOLIC BLOOD PRESSURE: 69 MMHG | TEMPERATURE: 98.4 F | SYSTOLIC BLOOD PRESSURE: 102 MMHG | HEART RATE: 93 BPM | OXYGEN SATURATION: 99 %

## 2023-08-24 DIAGNOSIS — D64.9 ANEMIA, UNSPECIFIED TYPE: Primary | ICD-10-CM

## 2023-08-24 PROCEDURE — 25010000002 IRON SUCROSE PER 1 MG: Performed by: INTERNAL MEDICINE

## 2023-08-24 PROCEDURE — 63710000001 DIPHENHYDRAMINE PER 50 MG: Performed by: INTERNAL MEDICINE

## 2023-08-24 PROCEDURE — 96374 THER/PROPH/DIAG INJ IV PUSH: CPT

## 2023-08-24 RX ORDER — SODIUM CHLORIDE 9 MG/ML
250 INJECTION, SOLUTION INTRAVENOUS ONCE
Status: COMPLETED | OUTPATIENT
Start: 2023-08-24 | End: 2023-08-24

## 2023-08-24 RX ORDER — ACETAMINOPHEN 325 MG/1
650 TABLET ORAL ONCE
Status: COMPLETED | OUTPATIENT
Start: 2023-08-24 | End: 2023-08-24

## 2023-08-24 RX ORDER — DIPHENHYDRAMINE HCL 25 MG
25 CAPSULE ORAL ONCE
Status: COMPLETED | OUTPATIENT
Start: 2023-08-24 | End: 2023-08-24

## 2023-08-24 RX ADMIN — ACETAMINOPHEN 650 MG: 325 TABLET ORAL at 08:24

## 2023-08-24 RX ADMIN — IRON SUCROSE 200 MG: 20 INJECTION, SOLUTION INTRAVENOUS at 08:42

## 2023-08-24 RX ADMIN — SODIUM CHLORIDE 250 ML: 9 INJECTION, SOLUTION INTRAVENOUS at 08:24

## 2023-08-24 RX ADMIN — DIPHENHYDRAMINE HYDROCHLORIDE 25 MG: 25 CAPSULE ORAL at 08:24

## 2023-08-31 ENCOUNTER — INFUSION (OUTPATIENT)
Dept: ONCOLOGY | Facility: HOSPITAL | Age: 33
End: 2023-08-31
Payer: COMMERCIAL

## 2023-08-31 VITALS — HEART RATE: 93 BPM | DIASTOLIC BLOOD PRESSURE: 67 MMHG | TEMPERATURE: 98 F | SYSTOLIC BLOOD PRESSURE: 98 MMHG

## 2023-08-31 DIAGNOSIS — D64.9 ANEMIA, UNSPECIFIED TYPE: Primary | ICD-10-CM

## 2023-08-31 PROCEDURE — 63710000001 DIPHENHYDRAMINE PER 50 MG: Performed by: INTERNAL MEDICINE

## 2023-08-31 PROCEDURE — 96374 THER/PROPH/DIAG INJ IV PUSH: CPT

## 2023-08-31 PROCEDURE — 25010000002 IRON SUCROSE PER 1 MG: Performed by: INTERNAL MEDICINE

## 2023-08-31 RX ORDER — DIPHENHYDRAMINE HCL 25 MG
25 CAPSULE ORAL ONCE
Status: COMPLETED | OUTPATIENT
Start: 2023-08-31 | End: 2023-08-31

## 2023-08-31 RX ORDER — SODIUM CHLORIDE 9 MG/ML
250 INJECTION, SOLUTION INTRAVENOUS ONCE
Status: COMPLETED | OUTPATIENT
Start: 2023-08-31 | End: 2023-08-31

## 2023-08-31 RX ORDER — ACETAMINOPHEN 325 MG/1
650 TABLET ORAL ONCE
Status: COMPLETED | OUTPATIENT
Start: 2023-08-31 | End: 2023-08-31

## 2023-08-31 RX ADMIN — ACETAMINOPHEN 650 MG: 325 TABLET ORAL at 10:59

## 2023-08-31 RX ADMIN — DIPHENHYDRAMINE HYDROCHLORIDE 25 MG: 25 CAPSULE ORAL at 10:59

## 2023-08-31 RX ADMIN — SODIUM CHLORIDE 250 ML: 9 INJECTION, SOLUTION INTRAVENOUS at 11:00

## 2023-08-31 RX ADMIN — IRON SUCROSE 200 MG: 20 INJECTION, SOLUTION INTRAVENOUS at 11:12

## 2023-09-06 ENCOUNTER — TELEPHONE (OUTPATIENT)
Dept: GASTROENTEROLOGY | Facility: CLINIC | Age: 33
End: 2023-09-06
Payer: COMMERCIAL

## 2023-09-06 NOTE — TELEPHONE ENCOUNTER
Called about her EGD on 10/06/2023.  When doing the procedure, if the banding needs to done, rather not unless they have grown.  I can not commit to EGD every 3 months.  Only banding if grown.

## 2023-09-07 NOTE — TELEPHONE ENCOUNTER
If her esophageal varices are small, we would likely place her on a non-selective beta blocker such as Carvedilol which would decrease the portal hypertension that causes varices and she would not have to repeat the EGD as long as she's on that medication. If her esophageal varices are large, then she would need banding given the increased risk of bleeding with large size. If banding is performed, the EGD would ideally be repeated1 month later to make sure the varices are eradicated then 6 months later. Afterwards, you would have EGDs every 2 to 3 years for surveillance (not every 3 months).

## 2023-09-27 ENCOUNTER — TELEPHONE (OUTPATIENT)
Dept: GASTROENTEROLOGY | Facility: CLINIC | Age: 33
End: 2023-09-27
Payer: COMMERCIAL

## 2023-09-27 NOTE — TELEPHONE ENCOUNTER
PT HAS APPT10/3 WITH DR ROLLE    HE IS DOING A PROCEDURE ON HER 10/06    SHE HAS RECENTLY (9/15) AND HAD AN MRE DONE AT [facility].    THE RESULTS SHOWED AN ENLARGED BOWEL    SHE WANTS TO DISCUSS WITH DR ROLLE AT APPT    I ASKED HER TO CALL AND FAX OVER A COPY OF THE REPORT AND SCAN AND SHE SAID SHE WAS GOING TO TRY      PT CALLED BACK  TOLD HER OUR OFFICE WOULD HAVE TO REQUEST THE SCAN AND REPORTS    [phone]    DR YIP, 09/15 MRE

## 2023-10-02 PROBLEM — T40.2X5A THERAPEUTIC OPIOID INDUCED CONSTIPATION: Status: ACTIVE | Noted: 2023-07-03

## 2023-10-02 PROBLEM — K59.03 THERAPEUTIC OPIOID INDUCED CONSTIPATION: Status: ACTIVE | Noted: 2023-07-03

## 2023-10-03 ENCOUNTER — TELEPHONE (OUTPATIENT)
Dept: GASTROENTEROLOGY | Facility: CLINIC | Age: 33
End: 2023-10-03

## 2023-10-03 ENCOUNTER — OFFICE VISIT (OUTPATIENT)
Dept: GASTROENTEROLOGY | Facility: CLINIC | Age: 33
End: 2023-10-03
Payer: COMMERCIAL

## 2023-10-03 VITALS
HEIGHT: 62 IN | WEIGHT: 114.4 LBS | BODY MASS INDEX: 21.05 KG/M2 | DIASTOLIC BLOOD PRESSURE: 70 MMHG | SYSTOLIC BLOOD PRESSURE: 110 MMHG

## 2023-10-03 DIAGNOSIS — K74.3 PRIMARY BILIARY CIRRHOSIS: ICD-10-CM

## 2023-10-03 DIAGNOSIS — T40.2X5A THERAPEUTIC OPIOID INDUCED CONSTIPATION: Primary | ICD-10-CM

## 2023-10-03 DIAGNOSIS — R93.3 ABNORMAL FINDINGS ON DIAGNOSTIC IMAGING OF DIGESTIVE SYSTEM: ICD-10-CM

## 2023-10-03 DIAGNOSIS — I85.00 ESOPHAGEAL VARICES WITHOUT BLEEDING, UNSPECIFIED ESOPHAGEAL VARICES TYPE: ICD-10-CM

## 2023-10-03 DIAGNOSIS — K59.03 THERAPEUTIC OPIOID INDUCED CONSTIPATION: Primary | ICD-10-CM

## 2023-10-03 RX ORDER — CALCIUM CARBONATE 500 MG/1
1 TABLET, CHEWABLE ORAL DAILY
Status: ON HOLD | COMMUNITY
Start: 2023-09-17 | End: 2023-10-06

## 2023-10-03 NOTE — PROGRESS NOTES
Rachel Srinivasan is a 33 y.o. 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 Bertrand of  Hepatology. Last seen 9/2023. Has had extensive w/u at  looking into the etiology of cirrhosis. Noted liver biopsy from 5/2023 with no fibrosis noted.   - Adherent to Ursodiol 250 mg TID.   - Adherent to Spironolactone 25 mg QD for ascites.   - Dr. Bertrand recommended repeat EGD to assess for EV given resolution of ascites. Last EGD in 12/2022 had non-bleeding EV. EGD scheduled on 10/6.   - Noted MR Elastography last month with kPA of 3.2 c/w stage 1 to 2 fibrosis.   - Noted most recent LFTs from 9/18 with T Bili 0.4, , , AST 53 which has remained relatively stable over the last year other than mild worsening in ALT.     # OIC   - On Miralax PRN and daily fiber.   - Noted to be on Codeine 30 mg q8h PRN and reports taking it more often within the last few months.   - Has 2 bowel movements per week associated with straining. This will alternate with liquid brown stool with occasional fecal incontinence.     # Abnormal imaging of colon   - MR Elastography last month showed marked edema of the right colon associated and subperitoneal space edema which this distribution is highly suggestive of portal hypertension.  - Had colonoscopy in 12/2022 with 2 hyperplastic polyps removed but no abnormalities of the colonic mucosa was noted otherwise.   - Denies overt bleeding or unintentional weight loss.         Past Medical History:   Diagnosis Date    Anemia     Antisynthetase syndrome     follows w/Dr at     Arthritis     Chronic sore throat     advised by PCP to have sleep study    Esophageal varices     GERD (gastroesophageal reflux disease)     d/t  steroids    ILD (interstitial lung disease)     had normal PFTs, to have CT    Infectious mononucleosis     Kidney stones     history    Migraine     Myositis     Primary biliary cholangitis     Raynaud's syndrome     Rhabdomyolysis 2014    Shingles        Social History     Socioeconomic History    Marital status:      Spouse name: Jaquan   Tobacco Use    Smoking status: Former     Packs/day: 0.50     Years: 15.00     Pack years: 7.50     Types: Cigarettes     Quit date: 3/13/2014     Years since quittin.5     Passive exposure: Past    Smokeless tobacco: Never   Vaping Use    Vaping Use: Never used   Substance and Sexual Activity    Alcohol use: No    Drug use: Never    Sexual activity: Defer         Current Outpatient Medications:     Antacid Regular Strength 500 MG chewable tablet, Chew 1 tablet Daily., Disp: , Rfl:     butalbital-acetaminophen-caffeine (FIORICET, ESGIC) -40 MG per tablet, Take 1 tablet by mouth Every 6 (Six) Hours As Needed for Headache., Disp: , Rfl:     codeine 30 MG tablet, Take 1 tablet by mouth Every 8 (Eight) Hours As Needed., Disp: , Rfl:     folic acid (FOLVITE) 1 MG tablet, Take 1 tablet by mouth Daily., Disp: 30 tablet, Rfl: 3    hydrOXYzine (ATARAX) 25 MG tablet, , Disp: , Rfl:     omeprazole (priLOSEC) 40 MG capsule, TAKE 1 CAPSULE BY MOUTH DAILY, Disp: 30 capsule, Rfl: 11    ondansetron (ZOFRAN) 8 MG tablet, Take 1 tablet by mouth Every 8 (Eight) Hours As Needed for Nausea or Vomiting., Disp: 10 tablet, Rfl: 0    promethazine (PHENERGAN) 25 MG tablet, promethazine 25 mg tablet  TAKE 1 TABLET BY MOUTH EVERY 8 HOURS AS NEEDED, Disp: , Rfl:     riTUXimab (RITUXAN IV), Infuse 1 dose into a venous catheter Every 6 (Six) Months., Disp: , Rfl:     spironolactone (ALDACTONE) 25 MG tablet, Take 1 tablet by mouth Daily., Disp: 30 tablet, Rfl: 0    ursodiol (ACTIGALL) 250 MG tablet, Take 1 tablet by mouth 3 (Three) Times a Day., Disp: 90 tablet, Rfl: 11    vitamin D  (ERGOCALCIFEROL) 1.25 MG (15405 UT) capsule capsule, , Disp: , Rfl:     Naloxegol Oxalate (Movantik) 25 MG tablet, Take 1 tablet by mouth Every Morning., Disp: 30 tablet, Rfl: 11    Objective   Vitals:    10/03/23 0835   BP: 110/70         10/03/23  0835   Weight: 51.9 kg (114 lb 6.4 oz)     Body mass index is 21.19 kg/m².      Physical Exam    WBC   Date Value Ref Range Status   09/18/2023 3.91 3.70 - 10.30 10*3/uL Final     RBC   Date Value Ref Range Status   09/18/2023 4.57 3.90 - 5.20 10*6/uL Final     Hemoglobin   Date Value Ref Range Status   09/18/2023 12.6 11.2 - 15.7 g/dL Final     Hematocrit   Date Value Ref Range Status   09/18/2023 41.5 34.0 - 45.0 % Final     MCV   Date Value Ref Range Status   09/18/2023 91 79 - 98 fL Final     MCH   Date Value Ref Range Status   09/18/2023 27.6 26.0 - 32.0 pg Final     MCHC   Date Value Ref Range Status   09/18/2023 30.4 (L) 30.7 - 35.5 g/dL Final     RDW   Date Value Ref Range Status   09/18/2023 19.6 (H) 11.5 - 14.5 % Final     RDW-SD   Date Value Ref Range Status   08/10/2023 44.8 37.0 - 54.0 fl Final     MPV   Date Value Ref Range Status   09/18/2023 12.0 8.8 - 12.5 fL Final     Platelets   Date Value Ref Range Status   09/18/2023 143 (L) 155 - 369 10*3/uL Final     Neutrophil Rel %   Date Value Ref Range Status   08/30/2022 71.0 % Final     Neutrophil %   Date Value Ref Range Status   08/10/2023 73.8 42.7 - 76.0 % Final     Lymphocyte Rel %   Date Value Ref Range Status   08/30/2022 12.0 % Final     Lymphocyte %   Date Value Ref Range Status   08/10/2023 10.4 (L) 19.6 - 45.3 % Final     Monocyte Rel %   Date Value Ref Range Status   08/30/2022 16.0 % Final     Monocyte %   Date Value Ref Range Status   08/10/2023 14.9 (H) 5.0 - 12.0 % Final     Eosinophil %   Date Value Ref Range Status   08/10/2023 0.5 0.3 - 6.2 % Final   08/30/2022 1.0 % Final     Basophil Rel %   Date Value Ref Range Status   08/30/2022 0.0 % Final     Basophil %   Date Value Ref Range Status    08/10/2023 0.2 0.0 - 1.5 % Final     Immature Grans %   Date Value Ref Range Status   08/10/2023 0.2 0.0 - 0.5 % Final   08/30/2022 0.0 % Final     Neutrophils Absolute   Date Value Ref Range Status   06/13/2023 2.79 1.60 - 6.10 10*3/uL Final     Neutrophils, Absolute   Date Value Ref Range Status   08/10/2023 3.06 1.70 - 7.00 10*3/mm3 Final     Lymphocytes Absolute   Date Value Ref Range Status   06/13/2023 0.36 (L) 1.20 - 3.90 10*3/uL Final     Lymphocytes, Absolute   Date Value Ref Range Status   08/10/2023 0.43 (L) 0.70 - 3.10 10*3/mm3 Final     Monocytes Absolute   Date Value Ref Range Status   06/13/2023 0.39 0.30 - 0.90 10*3/uL Final     Monocytes, Absolute   Date Value Ref Range Status   08/10/2023 0.62 0.10 - 0.90 10*3/mm3 Final     Eosinophils Absolute   Date Value Ref Range Status   06/13/2023 0.04 0.00 - 0.50 10*3/uL Final     Eosinophils, Absolute   Date Value Ref Range Status   08/10/2023 0.02 0.00 - 0.40 10*3/mm3 Final     Basophils Absolute   Date Value Ref Range Status   06/13/2023 0.00 0.00 - 0.10 10*3/uL Final     Basophils, Absolute   Date Value Ref Range Status   08/10/2023 0.01 0.00 - 0.20 10*3/mm3 Final     Immature Grans, Absolute   Date Value Ref Range Status   08/10/2023 0.01 0.00 - 0.05 10*3/mm3 Final   08/30/2022 0.01 0.00 - 0.06 10*3/uL Final     nRBC   Date Value Ref Range Status   09/18/2023 0.0 <=0.0 per 100 WBCs Final   08/10/2023 0.0 0.0 - 0.2 /100 WBC Final       Lab Results   Component Value Date    GLUCOSE 70 08/10/2023    BUN 5 (L) 08/10/2023    CREATININE 0.47 (L) 08/10/2023    EGFRIFNONA >60 02/15/2022    EGFRIFAFRI >60 02/15/2022    BCR 10.6 08/10/2023    CO2 21.9 (L) 08/10/2023    CALCIUM 8.2 (L) 08/10/2023    PROTENTOTREF 5.4 (L) 08/02/2023    ALBUMIN 3.6 08/10/2023    LABIL2 1.3 08/02/2023    AST 40 (H) 08/10/2023    ALT 65 (H) 08/10/2023         Imaging Results (Last 7 Days)       ** No results found for the last 168 hours. **              Assessment & Plan   Diagnoses  and all orders for this visit:    1. Therapeutic opioid induced constipation (Primary)  Assessment & Plan:  - Symptoms suggestive of overflow fecal incontinence   - Start Movantik 25 mg QD    Orders:  -     Naloxegol Oxalate (Movantik) 25 MG tablet; Take 1 tablet by mouth Every Morning.  Dispense: 30 tablet; Refill: 11    2. Abnormal findings on diagnostic imaging of digestive system  Assessment & Plan:  - Noted recent MR Elastography. Findings were highly suggestive of portal hypertension.   - Suspect etiology is portal colopathy   - Had recent colonoscopy in 12/2022 with no mucosal abnormalities.   - No further w/u needed at this time       3. Esophageal varices without bleeding, unspecified esophageal varices type  Assessment & Plan:  - EGD scheduled on 10/6 to further evaluate         4. Primary biliary cirrhosis  Assessment & Plan:  - Follows with [facility]   - Continue Ursodiol   - Continue Aldactone 25 mg QD for ascites           I have discussed the above plan with the patient.  They verbalize understanding and are in agreement with the plan.  They have been advised to contact the office for any questions, concerns, or changes related to their health.

## 2023-10-03 NOTE — TELEPHONE ENCOUNTER
Approvedtoday  The request has been approved. The authorization is effective from 10/03/2023 to 10/02/2024, as long as the member is enrolled in their current health plan. The request was approved as submitted. A written notification letter will follow with additional details.

## 2023-10-03 NOTE — ASSESSMENT & PLAN NOTE
- Noted recent MR Elastography. Findings were highly suggestive of portal hypertension.   - Suspect etiology is portal colopathy   - Had recent colonoscopy in 12/2022 with no mucosal abnormalities.   - No further w/u needed at this time

## 2023-10-05 ENCOUNTER — ANESTHESIA EVENT (OUTPATIENT)
Dept: PERIOP | Facility: HOSPITAL | Age: 33
End: 2023-10-05
Payer: COMMERCIAL

## 2023-10-05 ENCOUNTER — TRANSCRIBE ORDERS (OUTPATIENT)
Dept: ADMINISTRATIVE | Facility: HOSPITAL | Age: 33
End: 2023-10-05
Payer: COMMERCIAL

## 2023-10-05 DIAGNOSIS — I25.84 CORONARY ATHEROSCLEROSIS DUE TO SEVERELY CALCIFIED CORONARY LESION: Primary | ICD-10-CM

## 2023-10-06 ENCOUNTER — ANESTHESIA (OUTPATIENT)
Dept: PERIOP | Facility: HOSPITAL | Age: 33
End: 2023-10-06
Payer: COMMERCIAL

## 2023-10-06 ENCOUNTER — HOSPITAL ENCOUNTER (OUTPATIENT)
Facility: HOSPITAL | Age: 33
Setting detail: HOSPITAL OUTPATIENT SURGERY
Discharge: HOME | End: 2023-10-06
Attending: STUDENT IN AN ORGANIZED HEALTH CARE EDUCATION/TRAINING PROGRAM | Admitting: STUDENT IN AN ORGANIZED HEALTH CARE EDUCATION/TRAINING PROGRAM
Payer: COMMERCIAL

## 2023-10-06 VITALS
SYSTOLIC BLOOD PRESSURE: 119 MMHG | DIASTOLIC BLOOD PRESSURE: 85 MMHG | WEIGHT: 111 LBS | OXYGEN SATURATION: 99 % | TEMPERATURE: 96.6 F | RESPIRATION RATE: 20 BRPM | BODY MASS INDEX: 20.56 KG/M2 | HEART RATE: 89 BPM

## 2023-10-06 DIAGNOSIS — I85.00 ESOPHAGEAL VARICES WITHOUT BLEEDING, UNSPECIFIED ESOPHAGEAL VARICES TYPE: Primary | ICD-10-CM

## 2023-10-06 PROCEDURE — 25810000003 LACTATED RINGERS PER 1000 ML: Performed by: NURSE ANESTHETIST, CERTIFIED REGISTERED

## 2023-10-06 PROCEDURE — 25010000002 PROPOFOL 200 MG/20ML EMULSION: Performed by: NURSE ANESTHETIST, CERTIFIED REGISTERED

## 2023-10-06 PROCEDURE — 43244 EGD VARICES LIGATION: CPT | Performed by: STUDENT IN AN ORGANIZED HEALTH CARE EDUCATION/TRAINING PROGRAM

## 2023-10-06 RX ORDER — MAGNESIUM HYDROXIDE 1200 MG/15ML
LIQUID ORAL AS NEEDED
Status: DISCONTINUED | OUTPATIENT
Start: 2023-10-06 | End: 2023-10-06 | Stop reason: HOSPADM

## 2023-10-06 RX ORDER — PROPOFOL 10 MG/ML
INJECTION, EMULSION INTRAVENOUS AS NEEDED
Status: DISCONTINUED | OUTPATIENT
Start: 2023-10-06 | End: 2023-10-06 | Stop reason: SURG

## 2023-10-06 RX ORDER — SODIUM CHLORIDE 0.9 % (FLUSH) 0.9 %
10 SYRINGE (ML) INJECTION AS NEEDED
Status: DISCONTINUED | OUTPATIENT
Start: 2023-10-06 | End: 2023-10-06 | Stop reason: HOSPADM

## 2023-10-06 RX ORDER — LIDOCAINE HYDROCHLORIDE 20 MG/ML
INJECTION, SOLUTION EPIDURAL; INFILTRATION; INTRACAUDAL; PERINEURAL AS NEEDED
Status: DISCONTINUED | OUTPATIENT
Start: 2023-10-06 | End: 2023-10-06 | Stop reason: SURG

## 2023-10-06 RX ORDER — LIDOCAINE HYDROCHLORIDE 20 MG/ML
5 SOLUTION OROPHARYNGEAL ONCE
Status: COMPLETED | OUTPATIENT
Start: 2023-10-06 | End: 2023-10-06

## 2023-10-06 RX ORDER — SODIUM CHLORIDE 9 MG/ML
40 INJECTION, SOLUTION INTRAVENOUS AS NEEDED
Status: DISCONTINUED | OUTPATIENT
Start: 2023-10-06 | End: 2023-10-06 | Stop reason: HOSPADM

## 2023-10-06 RX ORDER — GLYCOPYRROLATE 0.2 MG/ML
INJECTION INTRAMUSCULAR; INTRAVENOUS AS NEEDED
Status: DISCONTINUED | OUTPATIENT
Start: 2023-10-06 | End: 2023-10-06 | Stop reason: SURG

## 2023-10-06 RX ORDER — SODIUM CHLORIDE, SODIUM LACTATE, POTASSIUM CHLORIDE, CALCIUM CHLORIDE 600; 310; 30; 20 MG/100ML; MG/100ML; MG/100ML; MG/100ML
9 INJECTION, SOLUTION INTRAVENOUS CONTINUOUS PRN
Status: DISCONTINUED | OUTPATIENT
Start: 2023-10-06 | End: 2023-10-06 | Stop reason: HOSPADM

## 2023-10-06 RX ORDER — SODIUM CHLORIDE 0.9 % (FLUSH) 0.9 %
10 SYRINGE (ML) INJECTION EVERY 12 HOURS SCHEDULED
Status: DISCONTINUED | OUTPATIENT
Start: 2023-10-06 | End: 2023-10-06 | Stop reason: HOSPADM

## 2023-10-06 RX ORDER — ONDANSETRON 2 MG/ML
4 INJECTION INTRAMUSCULAR; INTRAVENOUS ONCE AS NEEDED
Status: DISCONTINUED | OUTPATIENT
Start: 2023-10-06 | End: 2023-10-06 | Stop reason: HOSPADM

## 2023-10-06 RX ADMIN — LIDOCAINE HYDROCHLORIDE 50 MG: 20 INJECTION, SOLUTION EPIDURAL; INFILTRATION; INTRACAUDAL; PERINEURAL at 11:14

## 2023-10-06 RX ADMIN — SODIUM CHLORIDE, POTASSIUM CHLORIDE, SODIUM LACTATE AND CALCIUM CHLORIDE: 600; 310; 30; 20 INJECTION, SOLUTION INTRAVENOUS at 10:54

## 2023-10-06 RX ADMIN — PROPOFOL INJECTABLE EMULSION 350 MG: 10 INJECTION, EMULSION INTRAVENOUS at 11:14

## 2023-10-06 RX ADMIN — GLYCOPYRROLATE 0.1 MG: 0.2 INJECTION INTRAMUSCULAR; INTRAVENOUS at 11:14

## 2023-10-06 RX ADMIN — LIDOCAINE HYDROCHLORIDE 5 ML: 20 SOLUTION ORAL at 12:00

## 2023-10-06 NOTE — ANESTHESIA PREPROCEDURE EVALUATION
Anesthesia Evaluation     Patient summary reviewed and Nursing notes reviewed   NPO Solid Status: > 8 hours  NPO Liquid Status: > 8 hours           Airway   Mallampati: II  TM distance: >3 FB  Neck ROM: full  No difficulty expected  Dental - normal exam     Pulmonary - normal exam   (+) a smoker (none for 7 years) Former,Sleep apnea: problable.  Cardiovascular - negative cardio ROS and normal exam  Exercise tolerance: good (4-7 METS)        Neuro/Psych  (+) headaches (miraines), numbness (facial with migraines), psychiatric history Depression and AnxietyDizziness: dizziness with migraines.    ROS Comment: History of weakness due to autoimmune disorder  GI/Hepatic/Renal/Endo    (+) GERD (Esophageal varices) well controlled, liver disease (Primary biliary cholangitis), renal disease stones  (-) hepatitis    Musculoskeletal     (+) back pain, chronic pain  Abdominal  - normal exam   Substance History - negative use     OB/GYN negative ob/gyn ROS         Other   arthritis (generalized), autoimmune disease (antisynthetase syndrome) , blood dyscrasia anemia,   Chronic steroid use: HX [id number].           Allergies   Allergen Reactions   • Sumatriptan Nausea Only and Unknown - High Severity     Chest pain  Other reaction(s): Nausea Only, Unknown  Chest pain  Chest pain       Past Medical History:   Diagnosis Date   • Anemia    • Antisynthetase syndrome     follows w/Dr at    • Arthritis    • Chronic sore throat     advised by PCP to have sleep study   • Esophageal varices    • Fibrosis of liver    • GERD (gastroesophageal reflux disease)     d/t steroids   • ILD (interstitial lung disease)     had normal PFTs, to have CT   • Infectious mononucleosis    • Kidney stones     history   • Migraine    • Myositis    • Primary biliary cholangitis    • Raynaud's syndrome    • Rhabdomyolysis 07/2014   • Shingles      Past Surgical History:   Procedure Laterality Date   • ADENOIDECTOMY     • COLONOSCOPY W/ POLYPECTOMY N/A  12/1/2022    Procedure: Colonoscopy with polypectomy;  Surgeon: Soni Funes DO;  Location:  [facility] OR;  Service: Gastroenterology;  Laterality: N/A;  sigmoid polyp x1  rectal polyp x1   • D & C HYSTEROSCOPY N/A 6/10/2021    Procedure: diagnostic hysteroscopy, dilatation and curettage;  Surgeon: Pablito Vo MD;  Location: [facility] OR;  Service: Obstetrics/Gynecology;  Laterality: N/A;   • EAR TUBES     • ENDOSCOPY N/A 12/1/2022    Procedure: Esophagogastroduodenoscopy with biopsy ;  Surgeon: Soni Funes DO;  Location:  [facility] OR;  Service: Gastroenterology;  Laterality: N/A;  nereida test  esophageal varices  gastritis   • EYE SURGERY Right     eyelid reconstruction   • MUSCLE BIOPSY  2014    thigh right   • SKIN BIOPSY      right thigh   • VENOUS ACCESS DEVICE (PORT) INSERTION N/A 7/6/2018    Procedure: INSERTION VENOUS ACCESS DEVICE;  Surgeon: Bo Thomas MD;  Location: [facility] OR;  Service: General   • VENOUS ACCESS DEVICE (PORT) REMOVAL Right 2/7/2020    Procedure: REMOVAL VENOUS ACCESS DEVICE;  Surgeon: Bo Thomas MD;  Location: [facility] OR;  Service: General;  Laterality: Right;  REMOVAL VENOUS ACCESS DEVICE     Lab Results   Component Value Date    WBC 3.91 09/18/2023    HGB 12.6 09/18/2023    HCT 41.5 09/18/2023    MCV 91 09/18/2023     (L) 09/18/2023      Lab Results   Component Value Date    GLUCOSE 70 08/10/2023    CALCIUM 8.2 (L) 08/10/2023     08/10/2023    K 3.7 08/10/2023    CO2 21.9 (L) 08/10/2023     08/10/2023    BUN 5 (L) 08/10/2023    CREATININE 0.47 (L) 08/10/2023    EGFR 127.5 08/10/2023    BCR 10.6 08/10/2023    ANIONGAP 11.1 08/10/2023     INR          2/15/2023    09:14 3/4/2023    10:34 4/5/2023    09:48 8/10/2023    12:59 9/18/2023    09:03   Common Labsle   INR 1.37  1.43  1.2     1.35  1.5          Details          This result is from an external source.              No current facility-administered medications on file prior to  encounter.     Current Outpatient Medications on File Prior to Encounter   Medication Sig Dispense Refill   • butalbital-acetaminophen-caffeine (FIORICET, ESGIC) -40 MG per tablet Take 1 tablet by mouth Every 6 (Six) Hours As Needed for Headache.     • codeine 30 MG tablet Take 1 tablet by mouth Every 8 (Eight) Hours As Needed.     • spironolactone (ALDACTONE) 25 MG tablet Take 1 tablet by mouth Daily. 30 tablet 0   • ursodiol (ACTIGALL) 250 MG tablet Take 1 tablet by mouth 3 (Three) Times a Day. 90 tablet 11   • ondansetron (ZOFRAN) 8 MG tablet Take 1 tablet by mouth Every 8 (Eight) Hours As Needed for Nausea or Vomiting. 10 tablet 0   • promethazine (PHENERGAN) 25 MG tablet promethazine 25 mg tablet   TAKE 1 TABLET BY MOUTH EVERY 8 HOURS AS NEEDED     • riTUXimab (RITUXAN IV) Infuse 1 dose into a venous catheter Every 6 (Six) Months.        Family History   Problem Relation Age of Onset   • Hyperlipidemia Mother    • Hypertension Mother    • Lung cancer Father    • Cancer Father    • Throat cancer Father    • Cervical cancer Brother    • Malig Hyperthermia Neg Hx       Social History     Socioeconomic History   • Marital status:      Spouse name: Jaquan   Tobacco Use   • Smoking status: Former     Packs/day: 0.50     Years: 15.00     Pack years: 7.50     Types: Cigarettes     Quit date: 3/13/2014     Years since quittin.5     Passive exposure: Past   • Smokeless tobacco: Never   Vaping Use   • Vaping Use: Never used   Substance and Sexual Activity   • Alcohol use: No   • Drug use: Never   • Sexual activity: Defer      No orders to display                          Anesthesia Plan    ASA 2     MAC   total IV anesthesia  intravenous induction     Anesthetic plan, risks, benefits, and alternatives have been provided, discussed and informed consent has been obtained with: patient.    Use of blood products discussed with patient  Consented to blood products.

## 2023-10-06 NOTE — ANESTHESIA POSTPROCEDURE EVALUATION
Patient: Rachel Srinivasan    Procedure Summary       Date: 10/06/23 Room / Location: [facility] ENDOSCOPY 1 /  [facility] OR    Anesthesia Start: 1104 Anesthesia Stop: 1136    Procedure: ESOPHAGOGASTRODUODENOSCOPY (Esophagus) Diagnosis:       Esophageal varices without bleeding, unspecified esophageal varices type      (Esophageal varices without bleeding, unspecified esophageal varices type [I85.00])    Surgeons: Harpal Pinto MD Provider: Roberta Ibarra CRNA    Anesthesia Type: MAC ASA Status: 2            Anesthesia Type: MAC    Vitals  Vitals Value Taken Time   /84 10/06/23 1200   Temp 96.6 °F (35.9 °C) 10/06/23 1141   Pulse 83 10/06/23 1215   Resp 20 10/06/23 1200   SpO2 99 % 10/06/23 1215   Vitals shown include unvalidated device data.        Post Anesthesia Care and Evaluation    Patient location during evaluation: bedside  Patient participation: complete - patient participated  Level of consciousness: awake and alert  Pain management: adequate    Airway patency: patent  Anesthetic complications: No anesthetic complications  PONV Status: none  Cardiovascular status: acceptable  Respiratory status: acceptable  Hydration status: acceptable

## 2023-10-06 NOTE — H&P
Patient Care Team:  Kerrie Velez APRN as PCP - General (Family Medicine)  Hang Jordan MD as PCP - Family Medicine  Bo Thomas MD as Surgeon (General Surgery)  Kerrie Velez APRN as Referring Physician (Family Medicine)  Moises Simpson MD as Consulting Physician (Hematology and Oncology)    CHIEF COMPLAINT:  EV surveillance    HISTORY OF PRESENT ILLNESS:  For EV surveillance.   Last EGD in 12/2022 had non-bleeding EV.     Past Medical History:   Diagnosis Date    Anemia     Antisynthetase syndrome     follows w/Dr at     Arthritis     Chronic sore throat     advised by PCP to have sleep study    Esophageal varices     Fibrosis of liver     GERD (gastroesophageal reflux disease)     d/t steroids    ILD (interstitial lung disease)     had normal PFTs, to have CT    Infectious mononucleosis     Kidney stones     history    Migraine     Myositis     Primary biliary cholangitis     Raynaud's syndrome     Rhabdomyolysis 07/2014    Shingles      Past Surgical History:   Procedure Laterality Date    ADENOIDECTOMY      COLONOSCOPY W/ POLYPECTOMY N/A 12/1/2022    Procedure: Colonoscopy with polypectomy;  Surgeon: Soni Funes DO;  Location: [facility];  Service: Gastroenterology;  Laterality: N/A;  sigmoid polyp x1  rectal polyp x1    D & C HYSTEROSCOPY N/A 6/10/2021    Procedure: diagnostic hysteroscopy, dilatation and curettage;  Surgeon: Pablito Vo MD;  Location: [facility];  Service: Obstetrics/Gynecology;  Laterality: N/A;    EAR TUBES      ENDOSCOPY N/A 12/1/2022    Procedure: Esophagogastroduodenoscopy with biopsy ;  Surgeon: Soni Funes DO;  Location: [facility] OR;  Service: Gastroenterology;  Laterality: N/A;  nereida test  esophageal varices  gastritis    EYE SURGERY Right     eyelid reconstruction    MUSCLE BIOPSY  2014    thigh right    SKIN BIOPSY      right thigh    VENOUS ACCESS DEVICE (PORT) INSERTION N/A 7/6/2018    Procedure: INSERTION VENOUS ACCESS DEVICE;   Surgeon: Bo Thomas MD;  Location:  [facility] OR;  Service: General    VENOUS ACCESS DEVICE (PORT) REMOVAL Right 2020    Procedure: REMOVAL VENOUS ACCESS DEVICE;  Surgeon: Bo Thomas MD;  Location:  [facility] OR;  Service: General;  Laterality: Right;  REMOVAL VENOUS ACCESS DEVICE     Family History   Problem Relation Age of Onset    Hyperlipidemia Mother     Hypertension Mother     Lung cancer Father     Cancer Father     Throat cancer Father     Cervical cancer Brother     Malig Hyperthermia Neg Hx      Social History     Tobacco Use    Smoking status: Former     Packs/day: 0.50     Years: 15.00     Pack years: 7.50     Types: Cigarettes     Quit date: 3/13/2014     Years since quittin.5     Passive exposure: Past    Smokeless tobacco: Never   Vaping Use    Vaping Use: Never used   Substance Use Topics    Alcohol use: No    Drug use: Never     Medications Prior to Admission   Medication Sig Dispense Refill Last Dose    butalbital-acetaminophen-caffeine (FIORICET, ESGIC) -40 MG per tablet Take 1 tablet by mouth Every 6 (Six) Hours As Needed for Headache.   Past Month    codeine 30 MG tablet Take 1 tablet by mouth Every 8 (Eight) Hours As Needed.   10/5/2023    folic acid (FOLVITE) 1 MG tablet Take 1 tablet by mouth Daily. 30 tablet 3 10/5/2023    omeprazole (priLOSEC) 40 MG capsule TAKE 1 CAPSULE BY MOUTH DAILY 30 capsule 11 10/5/2023    spironolactone (ALDACTONE) 25 MG tablet Take 1 tablet by mouth Daily. 30 tablet 0 10/5/2023    ursodiol (ACTIGALL) 250 MG tablet Take 1 tablet by mouth 3 (Three) Times a Day. 90 tablet 11 10/5/2023    vitamin D (ERGOCALCIFEROL) 1.25 MG (30826 UT) capsule capsule    Past Week    hydrOXYzine (ATARAX) 25 MG tablet    10/4/2023    Naloxegol Oxalate (Movantik) 25 MG tablet Take 1 tablet by mouth Every Morning. 30 tablet 11     ondansetron (ZOFRAN) 8 MG tablet Take 1 tablet by mouth Every 8 (Eight) Hours As Needed for Nausea or Vomiting. 10 tablet 0 More than a month     promethazine (PHENERGAN) 25 MG tablet promethazine 25 mg tablet   TAKE 1 TABLET BY MOUTH EVERY 8 HOURS AS NEEDED   More than a month    riTUXimab (RITUXAN IV) Infuse 1 dose into a venous catheter Every 6 (Six) Months.   4/19/2023     Allergies:  Sumatriptan    REVIEW OF SYSTEMS:  Please see the above history of present illness for pertinent positives and negatives.  The remainder of the patient's systems have been reviewed and are negative.     Vital Signs  Temp:  [98.2 °F (36.8 °C)] 98.2 °F (36.8 °C)  Heart Rate:  [93] 93  Resp:  [16] 16  BP: (108)/(78) 108/78    Flowsheet Rows      Flowsheet Row First Filed Value   Admission Height --   Admission Weight 50.3 kg (111 lb) Documented at 10/06/2023 1004             Physical Exam:  Physical Exam   Constitutional: Patient appears well-developed and well-nourished and in no acute distress   HEENT:   Head: Normocephalic and atraumatic.   Eyes:  Pupils are equal, round, and reactive to light. EOM are intact. Sclerae are anicteric and non-injected.  Mouth and Throat: Patient has moist mucous membranes. Oropharynx is clear of any erythema or exudate.     Neck: Neck supple. No JVD present. No thyromegaly present. No lymphadenopathy present.  Cardiovascular: Regular rate, regular rhythm, S1 normal and S2 normal.  Exam reveals no gallop and no friction rub.  No murmur heard.  Pulmonary/Chest: Lungs are clear to auscultation bilaterally. No respiratory distress. No wheezes. No rhonchi. No rales.   Abdominal: Soft. Bowel sounds are normal. No distension and no mass. There is no hepatosplenomegaly. There is no tenderness.   Musculoskeletal: Normal Muscle tone  Extremities: No edema. Pulses are palpable in all 4 extremities.  Neurological: Patient is alert and oriented to person, place, and time. Cranial nerves II-XII are grossly intact with no focal deficits.  Skin: Skin is warm. No rash noted. Nails show no clubbing.  No cyanosis or erythema.    Debilities/Disabilities  Identified: None  Emotional Behavior: Appropriate     Results Review:   I reviewed the patient's new clinical results.    Lab Results (most recent)       None            Imaging Results (Most Recent)       None          reviewed    ECG/EMG Results (most recent)       None          reviewed    Assessment & Plan   EV surveillance /  EGD      I discussed the patient's findings and my recommendations with patient.     Harpal Pinto MD  10/06/23  11:14 EDT    Time: 10 min prior to procedure.

## 2023-10-09 ENCOUNTER — TELEPHONE (OUTPATIENT)
Dept: GASTROENTEROLOGY | Facility: CLINIC | Age: 33
End: 2023-10-09

## 2023-10-09 ENCOUNTER — TELEPHONE (OUTPATIENT)
Dept: GASTROENTEROLOGY | Facility: CLINIC | Age: 33
End: 2023-10-09
Payer: COMMERCIAL

## 2023-10-09 NOTE — TELEPHONE ENCOUNTER
Patient called regarding treatment plan after EGD.   Dr. Pinto has not signed off on results.   Advised patient that we will call and discuss once he has signed off on them.   Patient gave verbal understanding.

## 2023-10-09 NOTE — TELEPHONE ENCOUNTER
Caller: Rachel Srinivasan    Relationship: Self    Best call back number: [phone]     What form or medical record are you requesting: EGD RESULTS    Who is requesting this form or medical record from you: BECK YIP, LIVER SPECIALIST    How would you like to receive the form or medical records (pick-up, mail, fax): FAX  If fax, what is the fax number: [phone]    Timeframe paperwork needed: ASAP

## 2023-10-09 NOTE — TELEPHONE ENCOUNTER
Caller: Rachel Srinivasan    Relationship to patient: Self    Best call back number: [phone]     Patient is needing: PT WOULD LIKE CLARITY ABOUT HER FOLLOW UP INSTRUCTIONS REGARDING EGD ON 10/6/23. SHE IS UNSURE OF WHETHER OR NOT ANOTHER PROCEDURE IS REQUIRED. PLEASE CALL TO ADVISE.   OKAY TO LEAVE VM

## 2023-10-17 ENCOUNTER — OFFICE VISIT (OUTPATIENT)
Dept: CARDIOLOGY | Facility: CLINIC | Age: 33
End: 2023-10-17
Payer: COMMERCIAL

## 2023-10-17 VITALS
WEIGHT: 108.9 LBS | BODY MASS INDEX: 20.04 KG/M2 | HEIGHT: 62 IN | HEART RATE: 85 BPM | SYSTOLIC BLOOD PRESSURE: 98 MMHG | DIASTOLIC BLOOD PRESSURE: 65 MMHG

## 2023-10-17 DIAGNOSIS — R06.09 DYSPNEA ON EXERTION: ICD-10-CM

## 2023-10-17 DIAGNOSIS — I85.00 ESOPHAGEAL VARICES WITHOUT BLEEDING, UNSPECIFIED ESOPHAGEAL VARICES TYPE: ICD-10-CM

## 2023-10-17 DIAGNOSIS — I25.10 CORONARY ARTERY DISEASE INVOLVING NATIVE CORONARY ARTERY OF NATIVE HEART WITHOUT ANGINA PECTORIS: Primary | ICD-10-CM

## 2023-10-17 DIAGNOSIS — D89.89 ANTISYNTHETASE SYNDROME: ICD-10-CM

## 2023-10-17 DIAGNOSIS — K74.3 PRIMARY BILIARY CIRRHOSIS: ICD-10-CM

## 2023-10-17 PROCEDURE — 93000 ELECTROCARDIOGRAM COMPLETE: CPT | Performed by: STUDENT IN AN ORGANIZED HEALTH CARE EDUCATION/TRAINING PROGRAM

## 2023-10-17 PROCEDURE — 99204 OFFICE O/P NEW MOD 45 MIN: CPT | Performed by: STUDENT IN AN ORGANIZED HEALTH CARE EDUCATION/TRAINING PROGRAM

## 2023-10-17 PROCEDURE — 1159F MED LIST DOCD IN RCRD: CPT | Performed by: STUDENT IN AN ORGANIZED HEALTH CARE EDUCATION/TRAINING PROGRAM

## 2023-10-17 PROCEDURE — 1160F RVW MEDS BY RX/DR IN RCRD: CPT | Performed by: STUDENT IN AN ORGANIZED HEALTH CARE EDUCATION/TRAINING PROGRAM

## 2023-10-17 NOTE — PROGRESS NOTES
[facility]    Subjective:     Encounter Date:10/17/23      Patient ID: Rachel Srinivasan is a 33 y.o. female.    Chief Complaint:   Chief Complaint   Patient presents with    new patient    Coronary Artery Disease      History of Present Illness    Ms. 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  She has never been diagnosed with any heart disease in the past.  She previously smoked but quit in 2014.  She had mild lipid dyscrasias noted probably due to her chronic immunosuppression.  She otherwise denies any significant cardiac complaints.  She has never had chest pain or any cardiac complaints.  Her EKG today is normal.  She does have some mild dyspnea which is chronic and she attributes it to her chronic venous suppression.  She is very well versed in her past medical history and is extremely compliant with her medication regimen.  She otherwise is without complaints today.    The following portions of the patient's history were reviewed and updated as appropriate: allergies, current medications, past family history, past medical history, past social history, past surgical history and problem list.    Past Medical History:   Diagnosis Date    Anemia     Antisynthetase syndrome     follows w/ at     Arthritis     Chronic sore throat     advised by PCP to have sleep study    Esophageal varices     Fibrosis of liver     GERD (gastroesophageal reflux disease)     d/t steroids    ILD (interstitial lung disease)     had normal PFTs, to have CT    Infectious mononucleosis     Kidney stones     history     Migraine     Myositis     Primary biliary cholangitis     Raynaud's syndrome     Rhabdomyolysis 07/2014    Shingles        Past Surgical History:   Procedure Laterality Date    ADENOIDECTOMY      COLONOSCOPY W/ POLYPECTOMY N/A 12/1/2022    Procedure: Colonoscopy with polypectomy;  Surgeon: Soni Funes DO;  Location: [facility];  Service: Gastroenterology;  Laterality: N/A;  sigmoid polyp x1  rectal polyp x1    D & C HYSTEROSCOPY N/A 6/10/2021    Procedure: diagnostic hysteroscopy, dilatation and curettage;  Surgeon: Pablito Vo MD;  Location: [facility];  Service: Obstetrics/Gynecology;  Laterality: N/A;    EAR TUBES      ENDOSCOPY N/A 12/1/2022    Procedure: Esophagogastroduodenoscopy with biopsy ;  Surgeon: Soni Funes DO;  Location: [facility];  Service: Gastroenterology;  Laterality: N/A;  nereida test  esophageal varices  gastritis    ENDOSCOPY W/ BANDING N/A 10/6/2023    Procedure: ESOPHAGOGASTRODUODENOSCOPY WITH BANDING;  Surgeon: Harpal Pinto MD;  Location: [facility];  Service: Gastroenterology;  Laterality: N/A;  PHG  Esophageal varices    EYE SURGERY Right     eyelid reconstruction    MUSCLE BIOPSY  2014    thigh right    SKIN BIOPSY      right thigh    VENOUS ACCESS DEVICE (PORT) INSERTION N/A 7/6/2018    Procedure: INSERTION VENOUS ACCESS DEVICE;  Surgeon: Bo Thomas MD;  Location: [facility];  Service: General    VENOUS ACCESS DEVICE (PORT) REMOVAL Right 2/7/2020    Procedure: REMOVAL VENOUS ACCESS DEVICE;  Surgeon: Bo Thomas MD;  Location: [facility];  Service: General;  Laterality: Right;  REMOVAL VENOUS ACCESS DEVICE           ECG 12 Lead    Date/Time: 10/17/2023 8:35 AM  Performed by: Manjinder Amos MD    Authorized by: Manjinder Amos MD  Comparison: not compared with previous ECG   Previous ECG: no previous ECG available  Rhythm: sinus rhythm  Rate: normal  Conduction: conduction normal  ST Segments: ST segments normal  T Waves: T waves normal  QRS axis:  "normal  Other findings: low voltage    Clinical impression: non-specific ECG  Comments: Low voltage in precordial leads, otherwise unremarkable EKG.             Objective:     Vitals:    10/17/23 0817   BP: 98/65   BP Location: Left arm   Pulse: 85   Weight: 49.4 kg (108 lb 14.4 oz)   Height: 156.2 cm (61.5\")         Constitutional:       Appearance: Healthy appearance. Not in distress.   Neck:      Vascular: JVD normal.   Pulmonary:      Effort: Pulmonary effort is normal.      Breath sounds: Normal breath sounds.   Cardiovascular:      PMI at left midclavicular line. Normal rate. Regular rhythm. Normal S2.       Murmurs: There is no murmur.   Pulses:     Intact distal pulses.   Edema:     Peripheral edema absent.   Skin:     General: Skin is warm and dry.   Neurological:      General: No focal deficit present.      Mental Status: Alert, oriented to person, place, and time and oriented to person, place and time.   Psychiatric:         Mood and Affect: Mood and affect normal.         Lab Review:       BUN   Date Value Ref Range Status   08/10/2023 5 (L) 6 - 20 mg/dL Final   02/15/2022 7 7 - 21 mg/dL Final     Creatinine   Date Value Ref Range Status   08/10/2023 0.47 (L) 0.57 - 1.00 mg/dL Final   02/15/2022 0.48 (L) 0.60 - 1.10 mg/dL Final     Potassium   Date Value Ref Range Status   08/10/2023 3.7 3.5 - 5.2 mmol/L Final   02/15/2022 3.7 3.7 - 4.8 mmol/L Final     ALT (SGPT)   Date Value Ref Range Status   08/10/2023 65 (H) 1 - 33 U/L Final   02/15/2022 60 (H) 8 - 33 U/L Final     AST (SGOT)   Date Value Ref Range Status   08/10/2023 40 (H) 1 - 32 U/L Final   02/15/2022 42 (H) 11 - 32 U/L Final         Performed        Assessment:          Diagnosis Plan   1. Coronary artery disease involving native coronary artery of native heart without angina pectoris  Alirocumab 75 MG/ML solution auto-injector    Lipid Panel      2. Primary biliary cirrhosis  Alirocumab 75 MG/ML solution auto-injector      3. Esophageal varices " without bleeding, unspecified esophageal varices type        4. Antisynthetase syndrome  Alirocumab 75 MG/ML solution auto-injector      5. Dyspnea on exertion  Adult Transthoracic Echo Complete w/ Color, Spectral and Contrast if necessary per protocol             Plan:         Coronary artery disease, asymptomatic: I reviewed her CT chest directly from [facility] on June 13.  There are rather prominent coronary arterial calcifications in the proximal LAD as well as RCA, much more than expected for someone her age.  Otherwise cardiac silhouette appears unremarkable.  I do not see the role for calcium scoring.  An exact quantification of calcium burden would not .  Hyperlipidemia  Main risk factor at this time is hyperlipidemia, likely exacerbated by her chronic immunosuppression and previous DMARDs.  She is certainly at high risk for cardiovascular MACE in the future  Unfortunately, given her cirrhosis, treatment of lipids remains complicated.  I discussed rationale of treatment with PCSK9 to reduce the progression of any of her heart disease, she is amenable to proceeding  We will discuss with her rheumatologist and her hepatologist, after a brief literature review I do not see any significant PCSK9 interactions with her current autoimmune conditions, or her liver disease, I do think this is much safer alternative than statin therapy, as Praluent does not appear to have any significant hepatic lamination.  We will go ahead and work towards authorization of this medication, and will instruct patient to begin the biweekly injections so long as her hepatologist and rheumatologist are okay with the above course of treatment.  I do not see the need for aspirin in this circumstance given her mild varices and liver disease  We will check echocardiogram to ensure no high risk features, we will also check a bubble study to rule out any portal pulmonary hypertension  She has no symptoms, I do not  see the role for ischemic testing at this time.  Ultimately if her liver disease progresses, I do know that at [facility] she will undergo a multiphase CT scan that we will also incorporate a coronary CTA, and given the lack of symptoms I do not see the need for any ischemic testing at this time  Will defer any beta-blockade to her gastroenterologist per below  Primary biliary cirrhosis with cirrhotic liver changes: Follows with GI.  Will defer diuretics and beta-blocker use to her hepatologist  Previous history of myositis with antisynthetase syndrome: On Rituxan infusions.  PCSK9 per above.    Thank you for allowing me to participate in the care of Rachel Srinivasan. Feel free to contact me directly with any further questions or concerns.    RTC 6 weeks, will need to repeat lipids at that time and likely begin Praluent injections in the interim.  Checking echocardiogram per above    Manjinder Amos MD  [facility]  10/17/23  08:32 EDT       Current Outpatient Medications:     butalbital-acetaminophen-caffeine (FIORICET, ESGIC) -40 MG per tablet, Take 1 tablet by mouth Every 6 (Six) Hours As Needed for Headache., Disp: , Rfl:     codeine 30 MG tablet, Take 1 tablet by mouth Every 8 (Eight) Hours As Needed., Disp: , Rfl:     folic acid (FOLVITE) 1 MG tablet, Take 1 tablet by mouth Daily., Disp: 30 tablet, Rfl: 3    hydrOXYzine (ATARAX) 25 MG tablet, , Disp: , Rfl:     Naloxegol Oxalate (Movantik) 25 MG tablet, Take 1 tablet by mouth Every Morning., Disp: 30 tablet, Rfl: 11    omeprazole (priLOSEC) 40 MG capsule, TAKE 1 CAPSULE BY MOUTH DAILY, Disp: 30 capsule, Rfl: 11    ondansetron (ZOFRAN) 8 MG tablet, Take 1 tablet by mouth Every 8 (Eight) Hours As Needed for Nausea or Vomiting., Disp: 10 tablet, Rfl: 0    promethazine (PHENERGAN) 25 MG tablet, promethazine 25 mg tablet  TAKE 1 TABLET BY MOUTH EVERY 8 HOURS AS NEEDED, Disp: , Rfl:     riTUXimab (RITUXAN IV), Infuse 1 dose into a  venous catheter Every 6 (Six) Months., Disp: , Rfl:     spironolactone (ALDACTONE) 25 MG tablet, Take 1 tablet by mouth Daily., Disp: 30 tablet, Rfl: 0    ursodiol (ACTIGALL) 250 MG tablet, Take 1 tablet by mouth 3 (Three) Times a Day., Disp: 90 tablet, Rfl: 11    vitamin D (ERGOCALCIFEROL) 1.25 MG (02975 UT) capsule capsule, , Disp: , Rfl:     Alirocumab 75 MG/ML solution auto-injector, Inject 1 mL under the skin into the appropriate area as directed Every 14 (Fourteen) Days., Disp: 2 mL, Rfl: 11         Return in about 6 weeks (around 11/28/2023).      Part of this note may be an electronic transcription/translation of spoken language to printed text using the Dragon Dictation System.

## 2023-10-17 NOTE — LETTER
October 17, 2023     Tai Bertrand MD  [address] [facility] [address]    Patient: Rcahel Srinivasan   YOB: 1990   Date of Visit: 10/17/2023     Dear Tai Bertrand MD:       Thank you for referring Rachel Srinivasan to me for evaluation. Below are the relevant portions of my assessment and plan of care.  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  Her course has been complicated by what appears to be lipid abnormalities and subsequent silent atherosclerotic coronary heart disease.  I think she would benefit from PCSK9 inhibitor therapy given her liver disease and coronary heart disease to prevent the progression and reduce the likelihood of future cardiovascular major adverse cardiac events, so long as you do not feel that there is any contraindications to PCSK9 inhibitor therapy given your expertise.  I greatly appreciate your help, please reach out to our office if you feel that there are no preclusions to this therapy from your standpoint.    I look forward to following Rachel along with you.         Sincerely,        Manjinder Amos MD        CC: No Recipients    Manjinder Amos MD  10/17/23 0955  Sign when Signing Visit        [facility]    Subjective:     Encounter Date:10/17/23      Patient ID: Rachel Srinivasan is a 33 y.o. female.    Chief Complaint:   Chief Complaint   Patient presents with   • new patient   • Coronary Artery Disease      History of Present Illness    Ms. 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  She has never been diagnosed with any heart disease in the past.  She previously smoked but quit in 2014.  She had mild lipid dyscrasias noted probably due to her chronic immunosuppression.  She otherwise denies any significant cardiac complaints.  She has never had chest pain or any cardiac complaints.  Her EKG today is normal.  She does have some mild dyspnea which is chronic and she attributes it to her chronic venous suppression.  She is very well versed in her past medical history and is extremely compliant with her medication regimen.  She otherwise is without complaints today.    The following portions of the patient's history were reviewed and updated as appropriate: allergies, current medications, past family history, past medical history, past social history, past surgical history and problem list.    Past Medical History:   Diagnosis Date   • Anemia    • Antisynthetase syndrome     follows w/Dr at    • Arthritis    • Chronic sore throat     advised by PCP to have sleep study   • Esophageal varices    • Fibrosis of liver    • GERD (gastroesophageal reflux disease)     d/t steroids   • ILD (interstitial lung disease)     had normal PFTs, to have CT   • Infectious mononucleosis    • Kidney stones     history   • Migraine    • Myositis    • Primary biliary cholangitis    • Raynaud's syndrome    • Rhabdomyolysis 07/2014   • Shingles        Past Surgical History:   Procedure Laterality Date   • ADENOIDECTOMY     • COLONOSCOPY W/ POLYPECTOMY N/A 12/1/2022    Procedure: Colonoscopy with polypectomy;  Surgeon: Soni Funes DO;  Location: [facility];  Service: Gastroenterology;  Laterality: N/A;  sigmoid polyp x1  rectal polyp x1   • D & C HYSTEROSCOPY N/A 6/10/2021    Procedure: diagnostic hysteroscopy, dilatation and curettage;  Surgeon: Pablito Vo MD;  Location: [facility];  Service: Obstetrics/Gynecology;  Laterality: N/A;   • EAR TUBES     • ENDOSCOPY  "N/A 12/1/2022    Procedure: Esophagogastroduodenoscopy with biopsy ;  Surgeon: Soni Funes DO;  Location: [facility];  Service: Gastroenterology;  Laterality: N/A;  nereida test  esophageal varices  gastritis   • ENDOSCOPY W/ BANDING N/A 10/6/2023    Procedure: ESOPHAGOGASTRODUODENOSCOPY WITH BANDING;  Surgeon: Harpal Pinto MD;  Location: [facility];  Service: Gastroenterology;  Laterality: N/A;  PHG  Esophageal varices   • EYE SURGERY Right     eyelid reconstruction   • MUSCLE BIOPSY  2014    thigh right   • SKIN BIOPSY      right thigh   • VENOUS ACCESS DEVICE (PORT) INSERTION N/A 7/6/2018    Procedure: INSERTION VENOUS ACCESS DEVICE;  Surgeon: Bo Thomas MD;  Location: [facility];  Service: General   • VENOUS ACCESS DEVICE (PORT) REMOVAL Right 2/7/2020    Procedure: REMOVAL VENOUS ACCESS DEVICE;  Surgeon: Bo Thomas MD;  Location: [facility];  Service: General;  Laterality: Right;  REMOVAL VENOUS ACCESS DEVICE           ECG 12 Lead    Date/Time: 10/17/2023 8:35 AM  Performed by: Manjinder Amos MD    Authorized by: Manjinder Amos MD  Comparison: not compared with previous ECG   Previous ECG: no previous ECG available  Rhythm: sinus rhythm  Rate: normal  Conduction: conduction normal  ST Segments: ST segments normal  T Waves: T waves normal  QRS axis: normal  Other findings: low voltage    Clinical impression: non-specific ECG  Comments: Low voltage in precordial leads, otherwise unremarkable EKG.             Objective:     Vitals:    10/17/23 0817   BP: 98/65   BP Location: Left arm   Pulse: 85   Weight: 49.4 kg (108 lb 14.4 oz)   Height: 156.2 cm (61.5\")         Constitutional:       Appearance: Healthy appearance. Not in distress.   Neck:      Vascular: JVD normal.   Pulmonary:      Effort: Pulmonary effort is normal.      Breath sounds: Normal breath sounds.   Cardiovascular:      PMI at left midclavicular line. Normal rate. Regular rhythm. Normal S2.       Murmurs: There is no murmur. "   Pulses:     Intact distal pulses.   Edema:     Peripheral edema absent.   Skin:     General: Skin is warm and dry.   Neurological:      General: No focal deficit present.      Mental Status: Alert, oriented to person, place, and time and oriented to person, place and time.   Psychiatric:         Mood and Affect: Mood and affect normal.         Lab Review:       BUN   Date Value Ref Range Status   08/10/2023 5 (L) 6 - 20 mg/dL Final   02/15/2022 7 7 - 21 mg/dL Final     Creatinine   Date Value Ref Range Status   08/10/2023 0.47 (L) 0.57 - 1.00 mg/dL Final   02/15/2022 0.48 (L) 0.60 - 1.10 mg/dL Final     Potassium   Date Value Ref Range Status   08/10/2023 3.7 3.5 - 5.2 mmol/L Final   02/15/2022 3.7 3.7 - 4.8 mmol/L Final     ALT (SGPT)   Date Value Ref Range Status   08/10/2023 65 (H) 1 - 33 U/L Final   02/15/2022 60 (H) 8 - 33 U/L Final     AST (SGOT)   Date Value Ref Range Status   08/10/2023 40 (H) 1 - 32 U/L Final   02/15/2022 42 (H) 11 - 32 U/L Final         Performed        Assessment:          Diagnosis Plan   1. Coronary artery disease involving native coronary artery of native heart without angina pectoris  Alirocumab 75 MG/ML solution auto-injector    Lipid Panel      2. Primary biliary cirrhosis  Alirocumab 75 MG/ML solution auto-injector      3. Esophageal varices without bleeding, unspecified esophageal varices type        4. Antisynthetase syndrome  Alirocumab 75 MG/ML solution auto-injector      5. Dyspnea on exertion  Adult Transthoracic Echo Complete w/ Color, Spectral and Contrast if necessary per protocol             Plan:         Coronary artery disease, asymptomatic: I reviewed her CT chest directly from [facility] on June 13.  There are rather prominent coronary arterial calcifications in the proximal LAD as well as RCA, much more than expected for someone her age.  Otherwise cardiac silhouette appears unremarkable.  I do not see the role for calcium scoring.  An exact  quantification of calcium burden would not .  Hyperlipidemia  Main risk factor at this time is hyperlipidemia, likely exacerbated by her chronic immunosuppression and previous DMARDs.  She is certainly at high risk for cardiovascular MACE in the future  Unfortunately, given her cirrhosis, treatment of lipids remains complicated.  I discussed rationale of treatment with PCSK9 to reduce the progression of any of her heart disease, she is amenable to proceeding  We will discuss with her rheumatologist and her hepatologist, after a brief literature review I do not see any significant PCSK9 interactions with her current autoimmune conditions, or her liver disease, I do think this is much safer alternative than statin therapy, as Praluent does not appear to have any significant hepatic lamination.  We will go ahead and work towards authorization of this medication, and will instruct patient to begin the biweekly injections so long as her hepatologist and rheumatologist are okay with the above course of treatment.  I do not see the need for aspirin in this circumstance given her mild varices and liver disease  We will check echocardiogram to ensure no high risk features, we will also check a bubble study to rule out any portal pulmonary hypertension  She has no symptoms, I do not see the role for ischemic testing at this time.  Ultimately if her liver disease progresses, I do know that at [facility] she will undergo a multiphase CT scan that we will also incorporate a coronary CTA, and given the lack of symptoms I do not see the need for any ischemic testing at this time  Will defer any beta-blockade to her gastroenterologist per below  Primary biliary cirrhosis with cirrhotic liver changes: Follows with GI.  Will defer diuretics and beta-blocker use to her hepatologist  Previous history of myositis with antisynthetase syndrome: On Rituxan infusions.  PCSK9 per above.    Thank you for allowing  me to participate in the care of Rachel Srinivasan. Feel free to contact me directly with any further questions or concerns.    RTC 6 weeks, will need to repeat lipids at that time and likely begin Praluent injections in the interim.  Checking echocardiogram per above    Manjinder Amos MD  [facility]  10/17/23  08:32 EDT       Current Outpatient Medications:   •  butalbital-acetaminophen-caffeine (FIORICET, ESGIC) -40 MG per tablet, Take 1 tablet by mouth Every 6 (Six) Hours As Needed for Headache., Disp: , Rfl:   •  codeine 30 MG tablet, Take 1 tablet by mouth Every 8 (Eight) Hours As Needed., Disp: , Rfl:   •  folic acid (FOLVITE) 1 MG tablet, Take 1 tablet by mouth Daily., Disp: 30 tablet, Rfl: 3  •  hydrOXYzine (ATARAX) 25 MG tablet, , Disp: , Rfl:   •  Naloxegol Oxalate (Movantik) 25 MG tablet, Take 1 tablet by mouth Every Morning., Disp: 30 tablet, Rfl: 11  •  omeprazole (priLOSEC) 40 MG capsule, TAKE 1 CAPSULE BY MOUTH DAILY, Disp: 30 capsule, Rfl: 11  •  ondansetron (ZOFRAN) 8 MG tablet, Take 1 tablet by mouth Every 8 (Eight) Hours As Needed for Nausea or Vomiting., Disp: 10 tablet, Rfl: 0  •  promethazine (PHENERGAN) 25 MG tablet, promethazine 25 mg tablet  TAKE 1 TABLET BY MOUTH EVERY 8 HOURS AS NEEDED, Disp: , Rfl:   •  riTUXimab (RITUXAN IV), Infuse 1 dose into a venous catheter Every 6 (Six) Months., Disp: , Rfl:   •  spironolactone (ALDACTONE) 25 MG tablet, Take 1 tablet by mouth Daily., Disp: 30 tablet, Rfl: 0  •  ursodiol (ACTIGALL) 250 MG tablet, Take 1 tablet by mouth 3 (Three) Times a Day., Disp: 90 tablet, Rfl: 11  •  vitamin D (ERGOCALCIFEROL) 1.25 MG (31048 UT) capsule capsule, , Disp: , Rfl:   •  Alirocumab 75 MG/ML solution auto-injector, Inject 1 mL under the skin into the appropriate area as directed Every 14 (Fourteen) Days., Disp: 2 mL, Rfl: 11         Return in about 6 weeks (around 11/28/2023).      Part of this note may be an electronic transcription/translation of  spoken language to printed text using the Dragon Dictation System.

## 2023-10-17 NOTE — PATIENT INSTRUCTIONS
I will be in touch with your liver and rheumatology teams to make sure that Praulent is ok to begin, and once that goes through, we will instruct you to start it.     We will recheck cholesterol in the meantime, in 6 weeks, when we see you again.    We will get the echo to ensure there are no complications or heart damage signs from the calcium in the vessels of the heart.

## 2023-10-17 NOTE — LETTER
October 17, 2023     Cammy Lion MD  [address] [address]    Patient: Rachel Srinivasan   YOB: 1990   Date of Visit: 10/17/2023     Dear Cammy Lion MD:       Thank you for referring Rachel Srinivasan to me for evaluation. Below are the relevant portions of my assessment and plan of care.  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  Her course has been complicated by what appears to be lipid abnormalities and subsequent silent atherosclerotic coronary heart disease.  I think she would benefit from PCSK9 inhibitor therapy given her liver disease and coronary heart disease to prevent the progression and reduce the likelihood of future cardiovascular major adverse cardiac events, so long as you do not feel that there is any contraindications to PCSK9 inhibitor therapy given your expertise.  I greatly appreciate your help, please reach out to our office if you feel that there are no preclusions to this therapy from your standpoint.    I look forward to following Rachel along with you.    Sincerely,        Manjinder Amos MD        CC: No Recipients    Manjinder Amos MD  10/17/23 0955  Sign when Signing Visit        [facility]    Subjective:     Encounter Date:10/17/23      Patient ID: Rachel Srinivasan is a 33 y.o. female.    Chief Complaint:   Chief Complaint   Patient presents with   • new patient   • Coronary Artery Disease      History of Present Illness    Ms. 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  She has never been diagnosed with any heart disease in the past.  She previously smoked but quit in 2014.  She had mild lipid dyscrasias noted probably due to her chronic immunosuppression.  She otherwise denies any significant cardiac complaints.  She has never had chest pain or any cardiac complaints.  Her EKG today is normal.  She does have some mild dyspnea which is chronic and she attributes it to her chronic venous suppression.  She is very well versed in her past medical history and is extremely compliant with her medication regimen.  She otherwise is without complaints today.    The following portions of the patient's history were reviewed and updated as appropriate: allergies, current medications, past family history, past medical history, past social history, past surgical history and problem list.    Past Medical History:   Diagnosis Date   • Anemia    • Antisynthetase syndrome     follows w/Dr at    • Arthritis    • Chronic sore throat     advised by PCP to have sleep study   • Esophageal varices    • Fibrosis of liver    • GERD (gastroesophageal reflux disease)     d/t steroids   • ILD (interstitial lung disease)     had normal PFTs, to have CT   • Infectious mononucleosis    • Kidney stones     history   • Migraine    • Myositis    • Primary biliary cholangitis    • Raynaud's syndrome    • Rhabdomyolysis 07/2014   • Shingles        Past Surgical History:   Procedure Laterality Date   • ADENOIDECTOMY     • COLONOSCOPY W/ POLYPECTOMY N/A 12/1/2022    Procedure: Colonoscopy with polypectomy;  Surgeon: Soni Funes DO;  Location: [facility];  Service: Gastroenterology;  Laterality: N/A;  sigmoid polyp x1  rectal polyp x1   • D & C HYSTEROSCOPY N/A 6/10/2021    Procedure: diagnostic hysteroscopy, dilatation and curettage;  Surgeon: Pablito Vo MD;  Location: [facility];  Service: Obstetrics/Gynecology;  Laterality: N/A;   • EAR TUBES     •  "ENDOSCOPY N/A 12/1/2022    Procedure: Esophagogastroduodenoscopy with biopsy ;  Surgeon: Soni Funes DO;  Location: [facility] OR;  Service: Gastroenterology;  Laterality: N/A;  nereida test  esophageal varices  gastritis   • ENDOSCOPY W/ BANDING N/A 10/6/2023    Procedure: ESOPHAGOGASTRODUODENOSCOPY WITH BANDING;  Surgeon: Harpal Pinto MD;  Location: [facility] OR;  Service: Gastroenterology;  Laterality: N/A;  PHG  Esophageal varices   • EYE SURGERY Right     eyelid reconstruction   • MUSCLE BIOPSY  2014    thigh right   • SKIN BIOPSY      right thigh   • VENOUS ACCESS DEVICE (PORT) INSERTION N/A 7/6/2018    Procedure: INSERTION VENOUS ACCESS DEVICE;  Surgeon: Bo Thomas MD;  Location: [facility] OR;  Service: General   • VENOUS ACCESS DEVICE (PORT) REMOVAL Right 2/7/2020    Procedure: REMOVAL VENOUS ACCESS DEVICE;  Surgeon: Bo Thomas MD;  Location: [facility];  Service: General;  Laterality: Right;  REMOVAL VENOUS ACCESS DEVICE           ECG 12 Lead    Date/Time: 10/17/2023 8:35 AM  Performed by: Manjinder Amos MD    Authorized by: Manjinder Amos MD  Comparison: not compared with previous ECG   Previous ECG: no previous ECG available  Rhythm: sinus rhythm  Rate: normal  Conduction: conduction normal  ST Segments: ST segments normal  T Waves: T waves normal  QRS axis: normal  Other findings: low voltage    Clinical impression: non-specific ECG  Comments: Low voltage in precordial leads, otherwise unremarkable EKG.             Objective:     Vitals:    10/17/23 0817   BP: 98/65   BP Location: Left arm   Pulse: 85   Weight: 49.4 kg (108 lb 14.4 oz)   Height: 156.2 cm (61.5\")         Constitutional:       Appearance: Healthy appearance. Not in distress.   Neck:      Vascular: JVD normal.   Pulmonary:      Effort: Pulmonary effort is normal.      Breath sounds: Normal breath sounds.   Cardiovascular:      PMI at left midclavicular line. Normal rate. Regular rhythm. Normal S2.       Murmurs: There is no " murmur.   Pulses:     Intact distal pulses.   Edema:     Peripheral edema absent.   Skin:     General: Skin is warm and dry.   Neurological:      General: No focal deficit present.      Mental Status: Alert, oriented to person, place, and time and oriented to person, place and time.   Psychiatric:         Mood and Affect: Mood and affect normal.         Lab Review:       BUN   Date Value Ref Range Status   08/10/2023 5 (L) 6 - 20 mg/dL Final   02/15/2022 7 7 - 21 mg/dL Final     Creatinine   Date Value Ref Range Status   08/10/2023 0.47 (L) 0.57 - 1.00 mg/dL Final   02/15/2022 0.48 (L) 0.60 - 1.10 mg/dL Final     Potassium   Date Value Ref Range Status   08/10/2023 3.7 3.5 - 5.2 mmol/L Final   02/15/2022 3.7 3.7 - 4.8 mmol/L Final     ALT (SGPT)   Date Value Ref Range Status   08/10/2023 65 (H) 1 - 33 U/L Final   02/15/2022 60 (H) 8 - 33 U/L Final     AST (SGOT)   Date Value Ref Range Status   08/10/2023 40 (H) 1 - 32 U/L Final   02/15/2022 42 (H) 11 - 32 U/L Final         Performed        Assessment:          Diagnosis Plan   1. Coronary artery disease involving native coronary artery of native heart without angina pectoris  Alirocumab 75 MG/ML solution auto-injector    Lipid Panel      2. Primary biliary cirrhosis  Alirocumab 75 MG/ML solution auto-injector      3. Esophageal varices without bleeding, unspecified esophageal varices type        4. Antisynthetase syndrome  Alirocumab 75 MG/ML solution auto-injector      5. Dyspnea on exertion  Adult Transthoracic Echo Complete w/ Color, Spectral and Contrast if necessary per protocol             Plan:         Coronary artery disease, asymptomatic: I reviewed her CT chest directly from [facility] on June 13.  There are rather prominent coronary arterial calcifications in the proximal LAD as well as RCA, much more than expected for someone her age.  Otherwise cardiac silhouette appears unremarkable.  I do not see the role for calcium scoring.  An exact  quantification of calcium burden would not .  Hyperlipidemia  Main risk factor at this time is hyperlipidemia, likely exacerbated by her chronic immunosuppression and previous DMARDs.  She is certainly at high risk for cardiovascular MACE in the future  Unfortunately, given her cirrhosis, treatment of lipids remains complicated.  I discussed rationale of treatment with PCSK9 to reduce the progression of any of her heart disease, she is amenable to proceeding  We will discuss with her rheumatologist and her hepatologist, after a brief literature review I do not see any significant PCSK9 interactions with her current autoimmune conditions, or her liver disease, I do think this is much safer alternative than statin therapy, as Praluent does not appear to have any significant hepatic lamination.  We will go ahead and work towards authorization of this medication, and will instruct patient to begin the biweekly injections so long as her hepatologist and rheumatologist are okay with the above course of treatment.  I do not see the need for aspirin in this circumstance given her mild varices and liver disease  We will check echocardiogram to ensure no high risk features, we will also check a bubble study to rule out any portal pulmonary hypertension  She has no symptoms, I do not see the role for ischemic testing at this time.  Ultimately if her liver disease progresses, I do know that at [facility] she will undergo a multiphase CT scan that we will also incorporate a coronary CTA, and given the lack of symptoms I do not see the need for any ischemic testing at this time  Will defer any beta-blockade to her gastroenterologist per below  Primary biliary cirrhosis with cirrhotic liver changes: Follows with GI.  Will defer diuretics and beta-blocker use to her hepatologist  Previous history of myositis with antisynthetase syndrome: On Rituxan infusions.  PCSK9 per above.    Thank you for allowing  me to participate in the care of Rachel Srinivasan. Feel free to contact me directly with any further questions or concerns.    RTC 6 weeks, will need to repeat lipids at that time and likely begin Praluent injections in the interim.  Checking echocardiogram per above    Manjinder Amos MD  [facility]  10/17/23  08:32 EDT       Current Outpatient Medications:   •  butalbital-acetaminophen-caffeine (FIORICET, ESGIC) -40 MG per tablet, Take 1 tablet by mouth Every 6 (Six) Hours As Needed for Headache., Disp: , Rfl:   •  codeine 30 MG tablet, Take 1 tablet by mouth Every 8 (Eight) Hours As Needed., Disp: , Rfl:   •  folic acid (FOLVITE) 1 MG tablet, Take 1 tablet by mouth Daily., Disp: 30 tablet, Rfl: 3  •  hydrOXYzine (ATARAX) 25 MG tablet, , Disp: , Rfl:   •  Naloxegol Oxalate (Movantik) 25 MG tablet, Take 1 tablet by mouth Every Morning., Disp: 30 tablet, Rfl: 11  •  omeprazole (priLOSEC) 40 MG capsule, TAKE 1 CAPSULE BY MOUTH DAILY, Disp: 30 capsule, Rfl: 11  •  ondansetron (ZOFRAN) 8 MG tablet, Take 1 tablet by mouth Every 8 (Eight) Hours As Needed for Nausea or Vomiting., Disp: 10 tablet, Rfl: 0  •  promethazine (PHENERGAN) 25 MG tablet, promethazine 25 mg tablet  TAKE 1 TABLET BY MOUTH EVERY 8 HOURS AS NEEDED, Disp: , Rfl:   •  riTUXimab (RITUXAN IV), Infuse 1 dose into a venous catheter Every 6 (Six) Months., Disp: , Rfl:   •  spironolactone (ALDACTONE) 25 MG tablet, Take 1 tablet by mouth Daily., Disp: 30 tablet, Rfl: 0  •  ursodiol (ACTIGALL) 250 MG tablet, Take 1 tablet by mouth 3 (Three) Times a Day., Disp: 90 tablet, Rfl: 11  •  vitamin D (ERGOCALCIFEROL) 1.25 MG (83902 UT) capsule capsule, , Disp: , Rfl:   •  Alirocumab 75 MG/ML solution auto-injector, Inject 1 mL under the skin into the appropriate area as directed Every 14 (Fourteen) Days., Disp: 2 mL, Rfl: 11         Return in about 6 weeks (around 11/28/2023).      Part of this note may be an electronic transcription/translation of  spoken language to printed text using the Dragon Dictation System.

## 2023-10-18 ENCOUNTER — TELEPHONE (OUTPATIENT)
Dept: GASTROENTEROLOGY | Facility: CLINIC | Age: 33
End: 2023-10-18
Payer: COMMERCIAL

## 2023-10-18 NOTE — TELEPHONE ENCOUNTER
PT CALLED TO LEAVE A MESSAGE FOR DR ROLLE.    1) MOVANTIK IS NOT WORKING FOR HER    2) SHE ONLY TAKE CODEINE AS NEEDED, NOT REGULARLY    3) WHAT ARE HER OTHER OPTIONS IN REGARDS TO MOVANTIK...CAN SHE USE A LOWER DOSE OF THE LINZESS THAT SHE WAS PREVIOUSLY ON?    4) HER LIVER SPECIALIST PUT HER ON LOW DOSE BETA BLOCKER FOR HER HYPERTENSION    PLEASE CALL HER BACK [phone]

## 2023-10-19 DIAGNOSIS — K58.1 IRRITABLE BOWEL SYNDROME WITH CONSTIPATION: Primary | ICD-10-CM

## 2023-10-19 NOTE — TELEPHONE ENCOUNTER
Informed pt POC    PT questioned what time to arrive on the 26th for EGD  I told her scheduled 1220 to arrive at 11am will confirm with scheduling

## 2023-10-20 ENCOUNTER — ANESTHESIA EVENT (OUTPATIENT)
Dept: PERIOP | Facility: HOSPITAL | Age: 33
End: 2023-10-20
Payer: COMMERCIAL

## 2023-10-20 ENCOUNTER — ANESTHESIA (OUTPATIENT)
Dept: PERIOP | Facility: HOSPITAL | Age: 33
End: 2023-10-20
Payer: COMMERCIAL

## 2023-10-20 ENCOUNTER — HOSPITAL ENCOUNTER (OUTPATIENT)
Facility: HOSPITAL | Age: 33
Setting detail: HOSPITAL OUTPATIENT SURGERY
Discharge: HOME | End: 2023-10-20
Attending: STUDENT IN AN ORGANIZED HEALTH CARE EDUCATION/TRAINING PROGRAM | Admitting: STUDENT IN AN ORGANIZED HEALTH CARE EDUCATION/TRAINING PROGRAM
Payer: COMMERCIAL

## 2023-10-20 VITALS
BODY MASS INDEX: 19.8 KG/M2 | TEMPERATURE: 97.7 F | SYSTOLIC BLOOD PRESSURE: 109 MMHG | RESPIRATION RATE: 15 BRPM | WEIGHT: 107.6 LBS | HEART RATE: 77 BPM | OXYGEN SATURATION: 98 % | DIASTOLIC BLOOD PRESSURE: 86 MMHG | HEIGHT: 62 IN

## 2023-10-20 DIAGNOSIS — I85.00 ESOPHAGEAL VARICES WITHOUT BLEEDING, UNSPECIFIED ESOPHAGEAL VARICES TYPE: ICD-10-CM

## 2023-10-20 PROCEDURE — 25810000003 LACTATED RINGERS PER 1000 ML: Performed by: NURSE ANESTHETIST, CERTIFIED REGISTERED

## 2023-10-20 PROCEDURE — 25010000002 PROPOFOL 200 MG/20ML EMULSION: Performed by: NURSE ANESTHETIST, CERTIFIED REGISTERED

## 2023-10-20 PROCEDURE — 43244 EGD VARICES LIGATION: CPT | Performed by: STUDENT IN AN ORGANIZED HEALTH CARE EDUCATION/TRAINING PROGRAM

## 2023-10-20 RX ORDER — SODIUM CHLORIDE 0.9 % (FLUSH) 0.9 %
10 SYRINGE (ML) INJECTION EVERY 12 HOURS SCHEDULED
Status: DISCONTINUED | OUTPATIENT
Start: 2023-10-20 | End: 2023-10-20 | Stop reason: HOSPADM

## 2023-10-20 RX ORDER — GLYCOPYRROLATE 0.2 MG/ML
INJECTION INTRAMUSCULAR; INTRAVENOUS AS NEEDED
Status: DISCONTINUED | OUTPATIENT
Start: 2023-10-20 | End: 2023-10-20 | Stop reason: SURG

## 2023-10-20 RX ORDER — LIDOCAINE HYDROCHLORIDE 20 MG/ML
INJECTION, SOLUTION INFILTRATION; PERINEURAL AS NEEDED
Status: DISCONTINUED | OUTPATIENT
Start: 2023-10-20 | End: 2023-10-20 | Stop reason: SURG

## 2023-10-20 RX ORDER — SODIUM CHLORIDE 9 MG/ML
40 INJECTION, SOLUTION INTRAVENOUS AS NEEDED
Status: DISCONTINUED | OUTPATIENT
Start: 2023-10-20 | End: 2023-10-20 | Stop reason: HOSPADM

## 2023-10-20 RX ORDER — ONDANSETRON 2 MG/ML
4 INJECTION INTRAMUSCULAR; INTRAVENOUS ONCE AS NEEDED
Status: DISCONTINUED | OUTPATIENT
Start: 2023-10-20 | End: 2023-10-20 | Stop reason: HOSPADM

## 2023-10-20 RX ORDER — SODIUM CHLORIDE, SODIUM LACTATE, POTASSIUM CHLORIDE, CALCIUM CHLORIDE 600; 310; 30; 20 MG/100ML; MG/100ML; MG/100ML; MG/100ML
9 INJECTION, SOLUTION INTRAVENOUS CONTINUOUS
Status: DISCONTINUED | OUTPATIENT
Start: 2023-10-20 | End: 2023-10-20 | Stop reason: HOSPADM

## 2023-10-20 RX ORDER — LIDOCAINE HYDROCHLORIDE 10 MG/ML
0.5 INJECTION, SOLUTION INFILTRATION; PERINEURAL ONCE AS NEEDED
Status: DISCONTINUED | OUTPATIENT
Start: 2023-10-20 | End: 2023-10-20 | Stop reason: HOSPADM

## 2023-10-20 RX ORDER — SODIUM CHLORIDE 0.9 % (FLUSH) 0.9 %
10 SYRINGE (ML) INJECTION AS NEEDED
Status: DISCONTINUED | OUTPATIENT
Start: 2023-10-20 | End: 2023-10-20 | Stop reason: HOSPADM

## 2023-10-20 RX ORDER — PROPOFOL 10 MG/ML
INJECTION, EMULSION INTRAVENOUS AS NEEDED
Status: DISCONTINUED | OUTPATIENT
Start: 2023-10-20 | End: 2023-10-20 | Stop reason: SURG

## 2023-10-20 RX ADMIN — PROPOFOL INJECTABLE EMULSION 360 MG: 10 INJECTION, EMULSION INTRAVENOUS at 09:09

## 2023-10-20 RX ADMIN — LIDOCAINE HYDROCHLORIDE 50 MG: 20 INJECTION, SOLUTION INFILTRATION; PERINEURAL at 09:09

## 2023-10-20 RX ADMIN — SODIUM CHLORIDE, POTASSIUM CHLORIDE, SODIUM LACTATE AND CALCIUM CHLORIDE: 600; 310; 30; 20 INJECTION, SOLUTION INTRAVENOUS at 09:00

## 2023-10-20 RX ADMIN — GLYCOPYRROLATE 0.1 MG: 0.2 INJECTION INTRAMUSCULAR; INTRAVENOUS at 09:09

## 2023-10-20 NOTE — ANESTHESIA PREPROCEDURE EVALUATION
Anesthesia Evaluation     Patient summary reviewed and Nursing notes reviewed   NPO Solid Status: > 8 hours  NPO Liquid Status: > 8 hours           Airway   Mallampati: II  TM distance: >3 FB  Neck ROM: full  No difficulty expected  Dental - normal exam     Pulmonary - normal exam   (+) a smoker (none for 7 years) Former,Sleep apnea: problable.  Cardiovascular - negative cardio ROS and normal exam  Exercise tolerance: good (4-7 METS)        Neuro/Psych  (+) headaches (miraines), numbness (facial with migraines), psychiatric history Depression and AnxietyDizziness: dizziness with migraines.    ROS Comment: History of weakness due to autoimmune disorder  GI/Hepatic/Renal/Endo    (+) GERD (Esophageal varices) well controlled, liver disease (Primary biliary cholangitis), renal disease- stones  (-) hepatitis    Musculoskeletal     (+) back pain, chronic pain  Abdominal  - normal exam   Substance History - negative use     OB/GYN negative ob/gyn ROS         Other   arthritis (generalized), autoimmune disease (antisynthetase syndrome) , blood dyscrasia anemia,   Chronic steroid use: HX [id number].             Allergies   Allergen Reactions    Sumatriptan Nausea Only and Unknown - High Severity     Chest pain  Other reaction(s): Nausea Only, Unknown  Chest pain  Chest pain       Past Medical History:   Diagnosis Date    Anemia     Antisynthetase syndrome     follows w/Dr at     Arthritis     Chronic sore throat     advised by PCP to have sleep study    Esophageal varices     Fibrosis of liver     GERD (gastroesophageal reflux disease)     d/t steroids    ILD (interstitial lung disease)     had normal PFTs, to have CT    Infectious mononucleosis     Kidney stones     history    Migraine     Myositis     Primary biliary cholangitis     Raynaud's syndrome     Rhabdomyolysis 07/2014    Shingles      Past Surgical History:   Procedure Laterality Date    ADENOIDECTOMY      COLONOSCOPY W/ POLYPECTOMY N/A 12/1/2022    Procedure:  Colonoscopy with polypectomy;  Surgeon: Soni Funes DO;  Location: [facility] OR;  Service: Gastroenterology;  Laterality: N/A;  sigmoid polyp x1  rectal polyp x1    D & C HYSTEROSCOPY N/A 6/10/2021    Procedure: diagnostic hysteroscopy, dilatation and curettage;  Surgeon: Pablito Vo MD;  Location: [facility] OR;  Service: Obstetrics/Gynecology;  Laterality: N/A;    EAR TUBES      ENDOSCOPY N/A 12/1/2022    Procedure: Esophagogastroduodenoscopy with biopsy ;  Surgeon: Soni Funes DO;  Location: [facility] OR;  Service: Gastroenterology;  Laterality: N/A;  nereida test  esophageal varices  gastritis    ENDOSCOPY W/ BANDING N/A 10/6/2023    Procedure: ESOPHAGOGASTRODUODENOSCOPY WITH BANDING;  Surgeon: Harpal Pinto MD;  Location: [facility] OR;  Service: Gastroenterology;  Laterality: N/A;  PHG  Esophageal varices    EYE SURGERY Right     eyelid reconstruction    MUSCLE BIOPSY  2014    thigh right    SKIN BIOPSY      right thigh    VENOUS ACCESS DEVICE (PORT) INSERTION N/A 7/6/2018    Procedure: INSERTION VENOUS ACCESS DEVICE;  Surgeon: Bo Thomas MD;  Location: [facility] OR;  Service: General    VENOUS ACCESS DEVICE (PORT) REMOVAL Right 2/7/2020    Procedure: REMOVAL VENOUS ACCESS DEVICE;  Surgeon: Bo Thomas MD;  Location: [facility] OR;  Service: General;  Laterality: Right;  REMOVAL VENOUS ACCESS DEVICE     Lab Results   Component Value Date    WBC 3.91 09/18/2023    HGB 12.6 09/18/2023    HCT 41.5 09/18/2023    MCV 91 09/18/2023     (L) 09/18/2023      Lab Results   Component Value Date    GLUCOSE 70 08/10/2023    CALCIUM 8.2 (L) 08/10/2023     08/10/2023    K 3.7 08/10/2023    CO2 21.9 (L) 08/10/2023     08/10/2023    BUN 5 (L) 08/10/2023    CREATININE 0.47 (L) 08/10/2023    EGFR 127.5 08/10/2023    BCR 10.6 08/10/2023    ANIONGAP 11.1 08/10/2023     INR          2/15/2023    09:14 3/4/2023    10:34 4/5/2023    09:48 8/10/2023    12:59 9/18/2023    09:03   Common Labsle    INR 1.37  1.43  1.2     1.35  1.5          Details          This result is from an external source.              No current facility-administered medications on file prior to encounter.     Current Outpatient Medications on File Prior to Encounter   Medication Sig Dispense Refill    butalbital-acetaminophen-caffeine (FIORICET, ESGIC) -40 MG per tablet Take 1 tablet by mouth Every 6 (Six) Hours As Needed for Headache.      folic acid (FOLVITE) 1 MG tablet Take 1 tablet by mouth Daily. 30 tablet 3    hydrOXYzine (ATARAX) 25 MG tablet       omeprazole (priLOSEC) 40 MG capsule TAKE 1 CAPSULE BY MOUTH DAILY 30 capsule 11    spironolactone (ALDACTONE) 25 MG tablet Take 1 tablet by mouth Daily. 30 tablet 0    ursodiol (ACTIGALL) 250 MG tablet Take 1 tablet by mouth 3 (Three) Times a Day. 90 tablet 11    codeine 30 MG tablet Take 1 tablet by mouth Every 8 (Eight) Hours As Needed.      ondansetron (ZOFRAN) 8 MG tablet Take 1 tablet by mouth Every 8 (Eight) Hours As Needed for Nausea or Vomiting. 10 tablet 0    promethazine (PHENERGAN) 25 MG tablet promethazine 25 mg tablet   TAKE 1 TABLET BY MOUTH EVERY 8 HOURS AS NEEDED      riTUXimab (RITUXAN IV) Infuse 1 dose into a venous catheter Every 6 (Six) Months.        Family History   Problem Relation Age of Onset    Hyperlipidemia Mother     Hypertension Mother     Lung cancer Father     Cancer Father     Throat cancer Father     Cervical cancer Brother     Malig Hyperthermia Neg Hx       Social History     Socioeconomic History    Marital status:      Spouse name: Jaquan   Tobacco Use    Smoking status: Former     Packs/day: 0.50     Years: 15.00     Additional pack years: 0.00     Total pack years: 7.50     Types: Cigarettes     Quit date: 3/13/2014     Years since quittin.6     Passive exposure: Past    Smokeless tobacco: Never   Vaping Use    Vaping Use: Never used   Substance and Sexual Activity    Alcohol use: No    Drug use: Never    Sexual activity: Defer       No orders to display                          Anesthesia Plan    ASA 2     MAC   total IV anesthesia  intravenous induction     Anesthetic plan, risks, benefits, and alternatives have been provided, discussed and informed consent has been obtained with: patient.    Use of blood products discussed with patient  Consented to blood products.

## 2023-10-20 NOTE — NURSING NOTE
DC instructions reviewed with patient and mother. No questions or concerns at this time. Patient was able to tolerate water prior to DC. Patient transferred into personal car without issue.

## 2023-10-20 NOTE — ANESTHESIA POSTPROCEDURE EVALUATION
Patient: Rachel Srinivasan    Procedure Summary       Date: 10/20/23 Room / Location: [facility] ENDOSCOPY 1 /  [facility] OR    Anesthesia Start: 0903 Anesthesia Stop: 0922    Procedure: ESOPHAGOGASTRODUODENOSCOPY WITH BANDING (Esophagus) Diagnosis:       Esophageal varices without bleeding, unspecified esophageal varices type      (Esophageal varices without bleeding, unspecified esophageal varices type [I85.00])    Surgeons: Harpal Pinto MD Provider: Roberta Ibarra CRNA    Anesthesia Type: MAC ASA Status: 2            Anesthesia Type: MAC    Vitals  Vitals Value Taken Time   /76 10/20/23 0950   Temp 98 °F (36.7 °C) 10/20/23 0923   Pulse 83 10/20/23 0950   Resp 18 10/20/23 0950   SpO2 98 % 10/20/23 0950           Post Anesthesia Care and Evaluation    Patient location during evaluation: bedside  Patient participation: complete - patient participated  Level of consciousness: awake and alert  Pain score: 0  Pain management: adequate    Airway patency: patent  Anesthetic complications: No anesthetic complications  PONV Status: none  Cardiovascular status: acceptable  Respiratory status: acceptable  Hydration status: acceptable

## 2023-10-20 NOTE — H&P
Patient Care Team:  Kerrie Velez APRN as PCP - General (Family Medicine)  Hang Jordan MD as PCP - Family Medicine  Bo Thomas MD as Surgeon (General Surgery)  Kerrie Velez APRN as Referring Physician (Family Medicine)  Moises Simpson MD as Consulting Physician (Hematology and Oncology)    CHIEF COMPLAINT:  EV surveillance     HISTORY OF PRESENT ILLNESS:  For EV surveillance/eradication.   Last EGD on 10/6 had 3 cords of large esophageal varices s/p banding, moderate PHG. Recently started Propranolol by her Hepatologist but hasn't started taking it yet.    Past Medical History:   Diagnosis Date    Anemia     Antisynthetase syndrome     follows w/Dr at     Arthritis     Chronic sore throat     advised by PCP to have sleep study    Esophageal varices     Fibrosis of liver     GERD (gastroesophageal reflux disease)     d/t steroids    ILD (interstitial lung disease)     had normal PFTs, to have CT    Infectious mononucleosis     Kidney stones     history    Migraine     Myositis     Primary biliary cholangitis     Raynaud's syndrome     Rhabdomyolysis 07/2014    Shingles      Past Surgical History:   Procedure Laterality Date    ADENOIDECTOMY      COLONOSCOPY W/ POLYPECTOMY N/A 12/1/2022    Procedure: Colonoscopy with polypectomy;  Surgeon: Soni Funes DO;  Location: [facility] OR;  Service: Gastroenterology;  Laterality: N/A;  sigmoid polyp x1  rectal polyp x1    D & C HYSTEROSCOPY N/A 6/10/2021    Procedure: diagnostic hysteroscopy, dilatation and curettage;  Surgeon: Pablito Vo MD;  Location: [facility] OR;  Service: Obstetrics/Gynecology;  Laterality: N/A;    EAR TUBES      ENDOSCOPY N/A 12/1/2022    Procedure: Esophagogastroduodenoscopy with biopsy ;  Surgeon: Soni Funes DO;  Location: [facility] OR;  Service: Gastroenterology;  Laterality: N/A;  nereida test  esophageal varices  gastritis    ENDOSCOPY W/ BANDING N/A 10/6/2023    Procedure: ESOPHAGOGASTRODUODENOSCOPY WITH  BANDING;  Surgeon: Harpal Pinto MD;  Location:  [facility] OR;  Service: Gastroenterology;  Laterality: N/A;  PHG  Esophageal varices    EYE SURGERY Right     eyelid reconstruction    MUSCLE BIOPSY      thigh right    SKIN BIOPSY      right thigh    VENOUS ACCESS DEVICE (PORT) INSERTION N/A 2018    Procedure: INSERTION VENOUS ACCESS DEVICE;  Surgeon: Bo Thomas MD;  Location:  [facility] OR;  Service: General    VENOUS ACCESS DEVICE (PORT) REMOVAL Right 2020    Procedure: REMOVAL VENOUS ACCESS DEVICE;  Surgeon: Bo Thomas MD;  Location:  [facility] OR;  Service: General;  Laterality: Right;  REMOVAL VENOUS ACCESS DEVICE     Family History   Problem Relation Age of Onset    Hyperlipidemia Mother     Hypertension Mother     Lung cancer Father     Cancer Father     Throat cancer Father     Cervical cancer Brother     Malig Hyperthermia Neg Hx      Social History     Tobacco Use    Smoking status: Former     Packs/day: 0.50     Years: 15.00     Additional pack years: 0.00     Total pack years: 7.50     Types: Cigarettes     Quit date: 3/13/2014     Years since quittin.6     Passive exposure: Past    Smokeless tobacco: Never   Vaping Use    Vaping Use: Never used   Substance Use Topics    Alcohol use: No    Drug use: Never     Medications Prior to Admission   Medication Sig Dispense Refill Last Dose    butalbital-acetaminophen-caffeine (FIORICET, ESGIC) -40 MG per tablet Take 1 tablet by mouth Every 6 (Six) Hours As Needed for Headache.   Past Week    folic acid (FOLVITE) 1 MG tablet Take 1 tablet by mouth Daily. 30 tablet 3 10/19/2023    hydrOXYzine (ATARAX) 25 MG tablet    Past Week    omeprazole (priLOSEC) 40 MG capsule TAKE 1 CAPSULE BY MOUTH DAILY 30 capsule 11 10/19/2023    spironolactone (ALDACTONE) 25 MG tablet Take 1 tablet by mouth Daily. 30 tablet 0 10/19/2023    ursodiol (ACTIGALL) 250 MG tablet Take 1 tablet by mouth 3 (Three) Times a Day. 90 tablet 11 10/19/2023    codeine 30 MG  "tablet Take 1 tablet by mouth Every 8 (Eight) Hours As Needed.   Unknown    linaclotide (Linzess) 145 MCG capsule capsule Take 1 capsule by mouth Every Morning Before Breakfast. 30 capsule 11 Unknown    ondansetron (ZOFRAN) 8 MG tablet Take 1 tablet by mouth Every 8 (Eight) Hours As Needed for Nausea or Vomiting. 10 tablet 0 Unknown    promethazine (PHENERGAN) 25 MG tablet promethazine 25 mg tablet   TAKE 1 TABLET BY MOUTH EVERY 8 HOURS AS NEEDED   Unknown    riTUXimab (RITUXAN IV) Infuse 1 dose into a venous catheter Every 6 (Six) Months.   More than a month     Allergies:  Sumatriptan    REVIEW OF SYSTEMS:  Please see the above history of present illness for pertinent positives and negatives.  The remainder of the patient's systems have been reviewed and are negative.     Vital Signs  Temp:  [97.9 °F (36.6 °C)] 97.9 °F (36.6 °C)  Heart Rate:  [91] 91  Resp:  [23] 23  BP: (117)/(87) 117/87    Flowsheet Rows      Flowsheet Row First Filed Value   Admission Height 156.2 cm (61.5\") Documented at 10/19/2023 1620   Admission Weight 50.3 kg (111 lb) Documented at 10/19/2023 1620             Physical Exam:  Physical Exam   Constitutional: Patient appears well-developed and well-nourished and in no acute distress   HEENT:   Head: Normocephalic and atraumatic.   Eyes:  Pupils are equal, round, and reactive to light. EOM are intact. Sclerae are anicteric and non-injected.  Mouth and Throat: Patient has moist mucous membranes. Oropharynx is clear of any erythema or exudate.     Neck: Neck supple. No JVD present. No thyromegaly present. No lymphadenopathy present.  Cardiovascular: Regular rate, regular rhythm, S1 normal and S2 normal.  Exam reveals no gallop and no friction rub.  No murmur heard.  Pulmonary/Chest: Lungs are clear to auscultation bilaterally. No respiratory distress. No wheezes. No rhonchi. No rales.   Abdominal: Soft. Bowel sounds are normal. No distension and no mass. There is no hepatosplenomegaly. There is " no tenderness.   Musculoskeletal: Normal Muscle tone  Extremities: No edema. Pulses are palpable in all 4 extremities.  Neurological: Patient is alert and oriented to person, place, and time. Cranial nerves II-XII are grossly intact with no focal deficits.  Skin: Skin is warm. No rash noted. Nails show no clubbing.  No cyanosis or erythema.    Debilities/Disabilities Identified: None  Emotional Behavior: Appropriate     Results Review:   I reviewed the patient's new clinical results.    Lab Results (most recent)       None            Imaging Results (Most Recent)       None          reviewed    ECG/EMG Results (most recent)       None          reviewed    Assessment & Plan   EV surveillance /  EGD      I discussed the patient's findings and my recommendations with patient.     Harpal Pinto MD  10/20/23  09:08 EDT    Time: 10 min prior to procedure.

## 2023-10-24 ENCOUNTER — TELEPHONE (OUTPATIENT)
Dept: CARDIOLOGY | Facility: CLINIC | Age: 33
End: 2023-10-24
Payer: COMMERCIAL

## 2023-10-24 ENCOUNTER — HOSPITAL ENCOUNTER (OUTPATIENT)
Dept: CARDIOLOGY | Facility: HOSPITAL | Age: 33
Discharge: HOME | End: 2023-10-24
Admitting: STUDENT IN AN ORGANIZED HEALTH CARE EDUCATION/TRAINING PROGRAM
Payer: COMMERCIAL

## 2023-10-24 VITALS
BODY MASS INDEX: 19.47 KG/M2 | HEIGHT: 62 IN | SYSTOLIC BLOOD PRESSURE: 103 MMHG | DIASTOLIC BLOOD PRESSURE: 65 MMHG | WEIGHT: 105.82 LBS | HEART RATE: 88 BPM

## 2023-10-24 DIAGNOSIS — R06.09 DYSPNEA ON EXERTION: ICD-10-CM

## 2023-10-24 LAB
AORTIC DIMENSIONLESS INDEX: 0.7 (DI)
BH CV ECHO LEFT VENTRICLE GLOBAL LONGITUDINAL STRAIN: -21 %
BH CV ECHO MEAS - AO MAX PG: 4.5 MMHG
BH CV ECHO MEAS - AO MEAN PG: 2 MMHG
BH CV ECHO MEAS - AO V2 MAX: 106 CM/SEC
BH CV ECHO MEAS - AO V2 VTI: 19.6 CM
BH CV ECHO MEAS - AVA(I,D): 2.16 CM2
BH CV ECHO MEAS - EDV(CUBED): 79.5 ML
BH CV ECHO MEAS - EDV(MOD-SP2): 63 ML
BH CV ECHO MEAS - EDV(MOD-SP4): 69 ML
BH CV ECHO MEAS - EF(MOD-BP): 55.5 %
BH CV ECHO MEAS - EF(MOD-SP2): 58.7 %
BH CV ECHO MEAS - EF(MOD-SP4): 56.5 %
BH CV ECHO MEAS - ESV(CUBED): 31.9 ML
BH CV ECHO MEAS - ESV(MOD-SP2): 26 ML
BH CV ECHO MEAS - ESV(MOD-SP4): 30 ML
BH CV ECHO MEAS - FS: 26.2 %
BH CV ECHO MEAS - IVS/LVPW: 1.14 CM
BH CV ECHO MEAS - IVSD: 0.8 CM
BH CV ECHO MEAS - LAT PEAK E' VEL: 16.1 CM/SEC
BH CV ECHO MEAS - LV DIASTOLIC VOL/BSA (35-75): 47.4 CM2
BH CV ECHO MEAS - LV MASS(C)D: 96.8 GRAMS
BH CV ECHO MEAS - LV MAX PG: 2.37 MMHG
BH CV ECHO MEAS - LV MEAN PG: 1 MMHG
BH CV ECHO MEAS - LV SYSTOLIC VOL/BSA (12-30): 20.6 CM2
BH CV ECHO MEAS - LV V1 MAX: 77 CM/SEC
BH CV ECHO MEAS - LV V1 VTI: 14.1 CM
BH CV ECHO MEAS - LVIDD: 4.3 CM
BH CV ECHO MEAS - LVIDS: 3.2 CM
BH CV ECHO MEAS - LVOT AREA: 3 CM2
BH CV ECHO MEAS - LVOT DIAM: 1.95 CM
BH CV ECHO MEAS - LVPWD: 0.7 CM
BH CV ECHO MEAS - MED PEAK E' VEL: 12.2 CM/SEC
BH CV ECHO MEAS - MV A MAX VEL: 90.1 CM/SEC
BH CV ECHO MEAS - MV DEC SLOPE: 483.1 CM/SEC2
BH CV ECHO MEAS - MV DEC TIME: 0.18 SEC
BH CV ECHO MEAS - MV E MAX VEL: 73.9 CM/SEC
BH CV ECHO MEAS - MV E/A: 0.82
BH CV ECHO MEAS - MV MAX PG: 3.2 MMHG
BH CV ECHO MEAS - MV MEAN PG: 1.69 MMHG
BH CV ECHO MEAS - MV P1/2T: 57.4 MSEC
BH CV ECHO MEAS - MV V2 VTI: 22.2 CM
BH CV ECHO MEAS - MVA(P1/2T): 3.8 CM2
BH CV ECHO MEAS - MVA(VTI): 1.91 CM2
BH CV ECHO MEAS - PA ACC TIME: 0.13 SEC
BH CV ECHO MEAS - PA V2 MAX: 73 CM/SEC
BH CV ECHO MEAS - RV MAX PG: 2 MMHG
BH CV ECHO MEAS - RV V1 MAX: 70.7 CM/SEC
BH CV ECHO MEAS - RV V1 VTI: 14.7 CM
BH CV ECHO MEAS - SI(MOD-SP2): 25.4 ML/M2
BH CV ECHO MEAS - SI(MOD-SP4): 26.8 ML/M2
BH CV ECHO MEAS - SV(LVOT): 42.3 ML
BH CV ECHO MEAS - SV(MOD-SP2): 37 ML
BH CV ECHO MEAS - SV(MOD-SP4): 39 ML
BH CV ECHO MEASUREMENTS AVERAGE E/E' RATIO: 5.22
BH CV ECHO SHUNT ASSESSMENT PERFORMED (HIDDEN SCRIPTING): 1
BH CV XLRA - RV BASE: 2.4 CM
BH CV XLRA - RV LENGTH: 7.1 CM
BH CV XLRA - RV MID: 2.12 CM
BH CV XLRA - TDI S': 17.1 CM/SEC
LEFT ATRIUM VOLUME INDEX: 18.1 ML/M2
SINUS: 3.2 CM

## 2023-10-24 PROCEDURE — 93356 MYOCRD STRAIN IMG SPCKL TRCK: CPT | Performed by: INTERNAL MEDICINE

## 2023-10-24 PROCEDURE — 93306 TTE W/DOPPLER COMPLETE: CPT | Performed by: INTERNAL MEDICINE

## 2023-10-24 PROCEDURE — 93306 TTE W/DOPPLER COMPLETE: CPT

## 2023-10-24 PROCEDURE — 93356 MYOCRD STRAIN IMG SPCKL TRCK: CPT

## 2023-10-24 NOTE — TELEPHONE ENCOUNTER
I spoke with both Dr. Lion and Dr. Bertrand's office, and refaxed letters/office notes to both with a request for a response.    Lisette Sotelo, RN  Triage RN  10/24/23 11:26 EDT

## 2023-10-24 NOTE — TELEPHONE ENCOUNTER
Results and recommendations called to pt.  Instructed to call with any further questions or concerns.  Verbalized understanding.    Dr. Amos- pt hasn't heard anything about her injectable cholesterol medication yet.  She stated that she assumed that our office had sent in a PA and that it was still in the process.  She also asked if you had talked to her rheumatologist and hepatologist to see if they agreed that this medicine was ok for her to take.    sAya- is there a way you can tell if the PA was completed for Praluent?    Lisette Sotelo, OSMEL  Triage Nurse, [facility]  10/24/23 10:26 EDT

## 2023-10-24 NOTE — PROGRESS NOTES
Can we please call Ms. Srinivasan and let her know that her echo was normal.  The heart muscle is functioning normally there is no evidence of any heart damage or any heart strain.  There is no evidence of any significantly elevated pressures in her lungs to suggest a complication related to her liver disease.  There is also no evidence of any shunting in her lungs which would be also indicative of liver problems.  Overall, this is a reassuring ultrasound.  Can we follow-up with patient and see if she was able to obtain the injectable cholesterol medication?  Thank you for the help.

## 2023-10-24 NOTE — TELEPHONE ENCOUNTER
----- Message from Manjinder Amos MD sent at 10/24/2023  9:58 AM EDT -----  Can we please call Ms. Srinivasan and let her know that her echo was normal.  The heart muscle is functioning normally there is no evidence of any heart damage or any heart strain.  There is no evidence of any significantly elevated pressures in her lungs to suggest a complication related to her liver disease.  There is also no evidence of any shunting in her lungs which would be also indicative of liver problems.  Overall, this is a reassuring ultrasound.  Can we follow-up with patient and see if she was able to obtain the injectable cholesterol medication?  Thank you for the help.

## 2023-10-25 NOTE — TELEPHONE ENCOUNTER
Dr. Lion's office called back.  She is out of office, but they spoke with Dr. Sears (another MD in the office), and he stated that it is ok to start the PCSK9 inhibitor.    Lisette Sotelo, RN  Triage RN  10/25/23 11:13 EDT

## 2023-10-27 NOTE — TELEPHONE ENCOUNTER
I spoke with Dr. Bertrand's office again, and the nurse is sending the request to him again.  They will call us when they hear from him.    Lisette Sotelo, RN  Triage RN  10/27/23 08:52 EDT

## 2023-10-31 NOTE — TELEPHONE ENCOUNTER
I called to discuss findings with patient and she is amenable to starting PCSK9.  I have already sent the prescription for Repatha to her pharmacy.  Do we know about the status of the prior auth?  She said that she is in the process of changing insurances, not sure if that matters or not.  I had ordered the Praluent during the last clinic visit.  Thank you for the help

## 2023-11-03 ENCOUNTER — TELEPHONE (OUTPATIENT)
Dept: CARDIOLOGY | Facility: CLINIC | Age: 33
End: 2023-11-03
Payer: COMMERCIAL

## 2024-01-05 ENCOUNTER — TELEPHONE (OUTPATIENT)
Dept: CARDIOLOGY | Facility: CLINIC | Age: 34
End: 2024-01-05
Payer: COMMERCIAL

## 2024-01-05 NOTE — TELEPHONE ENCOUNTER
1/5/2024    Spoke with the patient in regards to the issues that she is currently having with getting the medication and the reason why it is so high is because she recently switched new insurance carriers at the beginning of the year she now has an hmo plan with [employer] who she believes but couldn't recall who the new company is and I just informed her to let us know the details of anything when she is able to access that information and just let the pharmacy know that we will be more than happy to do a PA and or a new co-pay card if needed she voiced an understanding and the call was ended

## 2024-01-23 ENCOUNTER — LAB (OUTPATIENT)
Dept: LAB | Facility: HOSPITAL | Age: 34
End: 2024-01-23
Payer: COMMERCIAL

## 2024-01-23 ENCOUNTER — TRANSCRIBE ORDERS (OUTPATIENT)
Dept: ADMINISTRATIVE | Facility: HOSPITAL | Age: 34
End: 2024-01-23
Payer: COMMERCIAL

## 2024-01-23 DIAGNOSIS — K74.3 PRIMARY BILIARY CHOLANGITIS: Primary | ICD-10-CM

## 2024-01-23 DIAGNOSIS — K74.3 PRIMARY BILIARY CHOLANGITIS: ICD-10-CM

## 2024-01-23 LAB
ALBUMIN SERPL-MCNC: 3.7 G/DL (ref 3.5–5.2)
ALBUMIN/GLOB SERPL: 1.8 G/DL
ALP SERPL-CCNC: 241 U/L (ref 39–117)
ALT SERPL W P-5'-P-CCNC: 66 U/L (ref 1–33)
ANION GAP SERPL CALCULATED.3IONS-SCNC: 10.6 MMOL/L (ref 5–15)
AST SERPL-CCNC: 44 U/L (ref 1–32)
BILIRUB SERPL-MCNC: 0.5 MG/DL (ref 0–1.2)
BUN SERPL-MCNC: 8 MG/DL (ref 6–20)
BUN/CREAT SERPL: 16 (ref 7–25)
CALCIUM SPEC-SCNC: 8.1 MG/DL (ref 8.6–10.5)
CHLORIDE SERPL-SCNC: 105 MMOL/L (ref 98–107)
CO2 SERPL-SCNC: 24.4 MMOL/L (ref 22–29)
CREAT SERPL-MCNC: 0.5 MG/DL (ref 0.57–1)
EGFRCR SERPLBLD CKD-EPI 2021: 125.6 ML/MIN/1.73
GLOBULIN UR ELPH-MCNC: 2.1 GM/DL
GLUCOSE SERPL-MCNC: 79 MG/DL (ref 65–99)
POTASSIUM SERPL-SCNC: 3.5 MMOL/L (ref 3.5–5.2)
PROT SERPL-MCNC: 5.8 G/DL (ref 6–8.5)
SODIUM SERPL-SCNC: 140 MMOL/L (ref 136–145)

## 2024-01-23 PROCEDURE — 36415 COLL VENOUS BLD VENIPUNCTURE: CPT

## 2024-01-23 PROCEDURE — 80053 COMPREHEN METABOLIC PANEL: CPT

## 2024-01-24 ENCOUNTER — TELEPHONE (OUTPATIENT)
Dept: GASTROENTEROLOGY | Facility: CLINIC | Age: 34
End: 2024-01-24
Payer: COMMERCIAL

## 2024-01-24 NOTE — TELEPHONE ENCOUNTER
Caller: Rachel Srinivasan    Relationship to patient: Self    Best call back number: [phone]    Patient is needing: PATIENT NEEDING TO SCHEDULE FOLLOW UP EGD FROM OCTOBER.  PLEASE REACH OUT TO SCHEDULE. THANK YOU

## 2024-01-25 ENCOUNTER — TELEPHONE (OUTPATIENT)
Dept: GASTROENTEROLOGY | Facility: CLINIC | Age: 34
End: 2024-01-25
Payer: COMMERCIAL

## 2024-01-25 NOTE — TELEPHONE ENCOUNTER
ONEL - Spoke with patient.  Her insurance coverage is non par with [employer].  She plans to try and find new provider. Unless she gets new coverage then will remain with us.

## 2024-06-13 ENCOUNTER — TELEPHONE (OUTPATIENT)
Dept: GASTROENTEROLOGY | Facility: CLINIC | Age: 34
End: 2024-06-13
Payer: COMMERCIAL

## 2024-06-13 NOTE — TELEPHONE ENCOUNTER
PT HAS SEEN DR ROLLE IN THE PAST    SHE WISHES TO COME BACK AS HIS PT    CURRENTLY UNDER CARE OF ANOTHER GASTRO FOR HER LIVER ISSUES SHE STATES    SHE HAS REFERRAL IN    SHE WOULD LIKE TO SEE HIM FOR AN EGD    SHE WOULD LIKE A CALL BACK    [phone]

## 2024-06-13 NOTE — TELEPHONE ENCOUNTER
You can scheduled her with Dr. Pinto for a time that suits her. She was referred out due to cirrhosis and liver issues.   She is still an established patient with us.

## 2024-06-14 DIAGNOSIS — K31.7 GASTRIC POLYP: Primary | ICD-10-CM

## 2024-06-14 NOTE — TELEPHONE ENCOUNTER
Called patient to try and get her scheduled for 6/18/2024, patient is unable to make the appointment.

## 2024-09-11 ENCOUNTER — TRANSCRIBE ORDERS (OUTPATIENT)
Dept: BONE DENSITY | Facility: HOSPITAL | Age: 34
End: 2024-09-11
Payer: COMMERCIAL

## 2024-09-11 DIAGNOSIS — K74.3 PRIMARY BILIARY CHOLANGITIS: Primary | ICD-10-CM

## 2024-10-15 ENCOUNTER — HOSPITAL ENCOUNTER (OUTPATIENT)
Dept: GENERAL RADIOLOGY | Facility: HOSPITAL | Age: 34
Discharge: HOME | End: 2024-10-15
Payer: COMMERCIAL

## 2024-10-15 ENCOUNTER — APPOINTMENT (OUTPATIENT)
Dept: BONE DENSITY | Facility: HOSPITAL | Age: 34
End: 2024-10-15
Payer: COMMERCIAL

## 2024-10-15 ENCOUNTER — PREP FOR SURGERY (OUTPATIENT)
Dept: SURGERY | Facility: SURGERY CENTER | Age: 34
End: 2024-10-15
Payer: COMMERCIAL

## 2024-10-15 ENCOUNTER — OFFICE VISIT (OUTPATIENT)
Age: 34
End: 2024-10-15
Payer: COMMERCIAL

## 2024-10-15 VITALS
OXYGEN SATURATION: 99 % | SYSTOLIC BLOOD PRESSURE: 112 MMHG | WEIGHT: 142.6 LBS | DIASTOLIC BLOOD PRESSURE: 82 MMHG | HEART RATE: 89 BPM | HEIGHT: 62 IN | TEMPERATURE: 97.6 F | BODY MASS INDEX: 26.24 KG/M2

## 2024-10-15 DIAGNOSIS — M51.360 DEGENERATION OF INTERVERTEBRAL DISC OF LUMBAR REGION WITH DISCOGENIC BACK PAIN: ICD-10-CM

## 2024-10-15 DIAGNOSIS — G89.29 CHRONIC BILATERAL LOW BACK PAIN, UNSPECIFIED WHETHER SCIATICA PRESENT: Primary | ICD-10-CM

## 2024-10-15 DIAGNOSIS — M54.50 CHRONIC BILATERAL LOW BACK PAIN, UNSPECIFIED WHETHER SCIATICA PRESENT: ICD-10-CM

## 2024-10-15 DIAGNOSIS — G89.29 CHRONIC BILATERAL LOW BACK PAIN, UNSPECIFIED WHETHER SCIATICA PRESENT: ICD-10-CM

## 2024-10-15 DIAGNOSIS — M62.838 MUSCLE SPASM: ICD-10-CM

## 2024-10-15 DIAGNOSIS — M54.50 CHRONIC BILATERAL LOW BACK PAIN, UNSPECIFIED WHETHER SCIATICA PRESENT: Primary | ICD-10-CM

## 2024-10-15 DIAGNOSIS — M79.7 FIBROMYALGIA: ICD-10-CM

## 2024-10-15 DIAGNOSIS — B02.29 POST HERPETIC NEURALGIA: ICD-10-CM

## 2024-10-15 DIAGNOSIS — M51.369 LUMBAR DEGENERATIVE DISC DISEASE: ICD-10-CM

## 2024-10-15 DIAGNOSIS — K74.3 PRIMARY BILIARY CHOLANGITIS: ICD-10-CM

## 2024-10-15 PROCEDURE — 72100 X-RAY EXAM L-S SPINE 2/3 VWS: CPT

## 2024-10-15 PROCEDURE — 77080 DXA BONE DENSITY AXIAL: CPT

## 2024-10-15 RX ORDER — URSODIOL 250 MG/1
250 TABLET, FILM COATED ORAL 3 TIMES DAILY
COMMUNITY

## 2024-10-15 RX ORDER — DULOXETIN HYDROCHLORIDE 60 MG/1
60 CAPSULE, DELAYED RELEASE ORAL DAILY
COMMUNITY
Start: 2024-09-27

## 2024-10-15 RX ORDER — GABAPENTIN 300 MG/1
300 CAPSULE ORAL NIGHTLY
COMMUNITY

## 2024-10-15 RX ORDER — DIAZEPAM 5 MG
10 TABLET ORAL ONCE
OUTPATIENT
Start: 2024-10-15

## 2024-10-15 RX ORDER — LIDOCAINE 50 MG/G
1 PATCH TOPICAL EVERY 24 HOURS
Qty: 30 PATCH | Refills: 2 | Status: SHIPPED | OUTPATIENT
Start: 2024-10-15

## 2024-10-15 RX ORDER — CARVEDILOL 3.12 MG/1
1 TABLET ORAL 2 TIMES DAILY
COMMUNITY
Start: 2023-10-17

## 2024-10-15 RX ORDER — NALOXONE HCL 8 MG/.1ML
SPRAY NASAL
COMMUNITY

## 2024-10-15 RX ORDER — TIZANIDINE HYDROCHLORIDE 4 MG/1
4 CAPSULE, GELATIN COATED ORAL 2 TIMES DAILY PRN
Qty: 45 CAPSULE | Refills: 0 | Status: SHIPPED | OUTPATIENT
Start: 2024-10-15

## 2024-10-15 RX ORDER — FENOFIBRATE 145 MG/1
1 TABLET, COATED ORAL DAILY
COMMUNITY

## 2024-10-15 NOTE — PROGRESS NOTES
CHIEF COMPLAINT  Back pain    Subjective   Rachel Srinivasan is a 34 y.o. female.   She presents to the office for evaluation of low back pain. She was referred here by NURY Carvajal.     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     Today pain is 4/10VAS in severity. Her main complaint today is axial low back pain that radiate across her low back in bandlike pattern. Denies radicular pain but notes pain will sometimes radiates into thoracic region. She also has post herpetic neuralgia pain in this area due to shingles outbreak in 2023. Describes this pain as a nearly continuous aching, burning, and throbbing. Pain is worsened by prolonged sitting or positioned and lying flat. Pain improves with rest/reposition, heat, and medications.    Current pain regimen includes OTC Tylenol PRN and Cymbalta 60mg daily (for fibro pain). She takes Gabapentin PRN for fibromyalgia pain. She has taken Tramadol and Tylenol with Codeine in the past for past which offered some relief.     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 The current episode started more than 1 year ago. The problem occurs constantly. The problem has been worse since onset. The pain is present in the lumbar spine and thoracic spine. The quality of the pain is described as aching and burning. The pain does not radiate. The pain is at a severity of 4/10. The symptoms are aggravated by sitting, position and lying down. Associated symptoms include headaches and numbness. Pertinent negatives include no abdominal pain, chest pain, dysuria, fever or weakness. She has tried muscle  relaxant and heat for the symptoms.      PEG Assessment   What number best describes your pain on average in the past week?4  What number best describes how, during the past week, pain has interfered with your enjoyment of life?0  What number best describes how, during the past week, pain has interfered with your general activity?  4  =    Current Outpatient Medications:     butalbital-acetaminophen-caffeine (FIORICET, ESGIC) -40 MG per tablet, Take 1 tablet by mouth Every 6 (Six) Hours As Needed for Headache., Disp: , Rfl:     carvedilol (COREG) 3.125 MG tablet, Take 1 tablet by mouth 2 (Two) Times a Day., Disp: , Rfl:     DULoxetine (CYMBALTA) 60 MG capsule, Take 1 capsule by mouth Daily., Disp: , Rfl:     hydrOXYzine (ATARAX) 25 MG tablet, , Disp: , Rfl:     promethazine (PHENERGAN) 25 MG tablet, promethazine 25 mg tablet  TAKE 1 TABLET BY MOUTH EVERY 8 HOURS AS NEEDED, Disp: , Rfl:     riTUXimab (RITUXAN IV), Infuse 1 dose into a venous catheter Every 6 (Six) Months., Disp: , Rfl:     spironolactone (ALDACTONE) 25 MG tablet, Take 1 tablet by mouth Daily., Disp: 30 tablet, Rfl: 0    fenofibrate (TRICOR) 145 MG tablet, Take 1 tablet by mouth Daily., Disp: , Rfl:     gabapentin (NEURONTIN) 300 MG capsule, Take 1 capsule by mouth Every Night., Disp: , Rfl:     Iron, Ferrous Sulfate, 325 (65 Fe) MG tablet, Take 325 mg by mouth Daily., Disp: , Rfl:     lidocaine (LIDODERM) 5 %, Place 1 patch on the skin as directed by provider Daily. Remove & Discard patch within 12 hours or as directed by MD, Disp: 30 patch, Rfl: 2    Naloxone HCl (Kloxxado) 8 MG/0.1ML liquid, , Disp: , Rfl:     TiZANidine (ZANAFLEX) 4 MG capsule, Take 1 capsule by mouth 2 (Two) Times a Day As Needed for Muscle Spasms., Disp: 45 capsule, Rfl: 0    ursodiol (ACTIGALL) 250 MG tablet, Take 1 tablet by mouth 3 (Three) Times a Day., Disp: , Rfl:     The following portions of the patient's history were reviewed and updated as appropriate: allergies,  "current medications, past family history, past medical history, past social history, past surgical history, and problem list.    REVIEW OF PERTINENT MEDICAL DATA  Reviewed office note from Kerrie Velez NP from 7/29/2024.  Patient presents with complaints of weight gain since starting amitriptyline. She would like to try switching to Cymbalta for her fibromyalgia pain.      She was referred to pain management for SI joint pain on 8/30/24  although there is no mention of this in her office note.           Review of Systems   Constitutional:  Positive for activity change (decreased) and fatigue. Negative for chills and fever.   HENT:  Negative for congestion.    Eyes:  Negative for visual disturbance.   Respiratory:  Negative for chest tightness and shortness of breath.    Cardiovascular:  Negative for chest pain.   Gastrointestinal:  Negative for abdominal pain, constipation and diarrhea.   Genitourinary:  Negative for difficulty urinating, dyspareunia and dysuria.   Musculoskeletal:  Positive for back pain.   Neurological:  Positive for dizziness, numbness and headaches. Negative for weakness and light-headedness.   Psychiatric/Behavioral:  Positive for sleep disturbance. Negative for agitation. The patient is not nervous/anxious.      I have reviewed and confirmed the accuracy of the ROS as documented by the MA/LPN/RN NURY Warner    Vitals:    10/15/24 1348   BP: 112/82   BP Location: Left arm   Patient Position: Sitting   Cuff Size: Adult   Pulse: 89   Temp: 97.6 °F (36.4 °C)   TempSrc: Temporal   SpO2: 99%   Weight: 64.7 kg (142 lb 9.6 oz)   Height: 157.5 cm (62\")   PainSc:   4   PainLoc: Back     Objective     Physical Exam  Constitutional:       Appearance: Normal appearance.   HENT:      Head: Normocephalic.   Cardiovascular:      Rate and Rhythm: Normal rate and regular rhythm.   Pulmonary:      Effort: Pulmonary effort is normal.      Breath sounds: Normal breath sounds.   Musculoskeletal:      " Cervical back: Normal range of motion.      Lumbar back: Tenderness and bony tenderness present. Decreased range of motion. Negative right straight leg raise test and negative left straight leg raise test.      Comments: + lumbar facet loading/tenderness  - SI joint tenderness bilaterally  - NISA'S test bilaterally    Skin:     General: Skin is warm and dry.      Capillary Refill: Capillary refill takes less than 2 seconds.   Neurological:      General: No focal deficit present.      Mental Status: She is alert and oriented to person, place, and time.   Psychiatric:         Mood and Affect: Mood normal.         Behavior: Behavior normal.         Thought Content: Thought content normal.         Cognition and Memory: Cognition normal.       Assessment & Plan   Diagnoses and all orders for this visit:    1. Chronic bilateral low back pain, unspecified whether sciatica present (Primary)  -     XR Spine Lumbar 2 or 3 View; Future    2. Post herpetic neuralgia  -     lidocaine (LIDODERM) 5 %; Place 1 patch on the skin as directed by provider Daily. Remove & Discard patch within 12 hours or as directed by MD  Dispense: 30 patch; Refill: 2    3. Muscle spasm  -     TiZANidine (ZANAFLEX) 4 MG capsule; Take 1 capsule by mouth 2 (Two) Times a Day As Needed for Muscle Spasms.  Dispense: 45 capsule; Refill: 0    4. Degeneration of intervertebral disc of lumbar region with discogenic back pain    5. Fibromyalgia      --- Rachel Srinivasan reports a pain score of 4.  Given her pain assessment as noted, treatment options were discussed and the following options were decided upon as a follow-up plan to address the patient's pain: continuation of current treatment plan for pain and prescription for non-opiod analgesics.    --- Obtained x-ray due to worsening low back pain. Mild degenerative changes noted. Patient will be scheduled for a Bilateral L4-S1 MBB x 2 with goal of RFA.   -------  Education about Medial Branch Blockade and RF  "Therapy:    This medial branch blockade (MBB) suggested is intended for diagnostic purposes, with the intent of offering the patient Radiofrequency thermal rhizotomy (RF) if the MBB is diagnostically effective.  The diagnostic blockade is necessary to determine the likelihood that RF therapy could be efficacious in providing long term relief to the patient.    Medial branches are sensory nerve branches that connect to a facet joint and transmit sensations & pain signals from that joint.  Facet is a term for the type of joints found in the spine.  Medial branches are the nerves that go to a facet, and therefore are also sometimes called \"facet joint nerves\" (FJNs).      In a medial branch blockade procedure, xray fluoroscopy is used to verify the locations of the outside of the joint lines which are being targeted.  Under xray guidance, needles are placed to these areas.  Contrast dye is injected to confirm proper placement, with dye flowing over the joint area, and to ensure that the dye does not flow into unintended areas such as a vein.  When this is confirmed, local anesthetic is injected to block the medial branch at that joint level.      If MBBs are diagnostically successful in blocking pain, then the patient is most likely a great candidate for Radiofrequency of those facet joint nerves.  In the RF procedure, needles are placed to the joint lines in the same fashion, and after testing, the needle tips are heated to thermally treat the nerves, blocking the nerves by in essence damaging the nerves with the heat treatment(non-pulsed).       Medically, a successful RF procedure should provide a patient with 50% pain relief or more for at least 6 months.  Clinical experience suggests that successful patients receive relief more in the range of 12 months on average.  We also discussed that a fortunate minority of patients receive therapeutic success from the MBB, and may not require RF ablation.  If a patient " receives more than 8 weeks of relief from MBB, then occasional repeat MBB for therapeutic purposes is a very reasonable alternative therapy.  This course of therapy is consistent with our LCDs according to our CMS  in the area, and therefore other insurance providers should follow accordingly.  We will monitor our patients to screen for these therapeutic responders and will offer RF therapy only when necessary.      We discussed that MBB & RF are not without risks.  Guidelines regarding anticoagulant use & neuraxial procedures will be respected.  Patients that are ill or otherwise may be at risk for sepsis will not have their spines accessed by neuraxial injections of any type.  This patient will not be offered these therapies if there is an increased risk.   We discussed that there is a risk of postprocedural pain and also a risk of worsening of clinical picture with these procedures as with any neuraxial procedure.    -------  Discussed with the patient that sedation is optional for this procedure.  The sedation offered is called conscious sedation which is different from general anesthesia that is utilized in surgical procedures. The dosing of the sedation is determined by the physician and they will be monitored throughout the procedure. With conscious sedation it is possible to remember parts or all of the procedure, this is normal. They will need to have a  with them as driving is prohibited following conscious sedation.      NPO instructions for conscious sedation:  --- Do not eat 6 hours prior to the procedure.   --- Do not drink any dairy or citrus 4 hours prior to the procedure.   --- Do not drink anything, including clear liquids, 2 hours prior to procedure.      If the NPO instructions are not followed then the procedure may be performed without sedation or the procedure will need to be rescheduled.    --- Trial Tizandine 4mg PRN muscle spasms. Patient appears stable with current  regimen. No adverse effects. Regarding continuation of opioids, there is no evidence of aberrant behavior or any red flags.  The patient continues with appropriate response to opioid therapy. ADL's remain intact by self.   --- Trial Lidocaine 5% patches. Refill sent to pharmacy.   --- Follow-up for procedure.     Diagnostic Facet Joint Procedure:   JORI   The first diagnostic facet joint procedure is considered medically reasonable and necessary for the diagnosis and treatment of chronic pain for this patient due to the patient meeting all of the following criteria:    1. Moderate to severe chronic neck or low back pain, predominantly axial, that causes functional deficit measured on pain or disability scale.  2. Pain present for minimum of 3 months with documented failure to respond to noninvasive conservative management (as tolerated)  3. Absence of untreated radiculopathy or neurogenic claudication (except for radiculopathy caused by facet joint synovial cyst)  4. There is no non-facet pathology per clinical assessment or radiology studies that could explain the source of the patient’s pain, including but not limited to fracture, tumor, infection, or significant deformity.     BLAYNE REPORT  As the clinician, I personally reviewed the BLAYNE from 10/15/24 while the patient was in the office today.    Dictated utilizing Dragon dictation.

## 2024-10-17 ENCOUNTER — TRANSCRIBE ORDERS (OUTPATIENT)
Dept: SURGERY | Facility: SURGERY CENTER | Age: 34
End: 2024-10-17
Payer: COMMERCIAL

## 2024-10-17 DIAGNOSIS — Z41.9 SURGERY, ELECTIVE: Primary | ICD-10-CM

## 2024-10-17 PROBLEM — M54.50 CHRONIC BILATERAL LOW BACK PAIN: Status: ACTIVE | Noted: 2024-10-15

## 2024-10-17 PROBLEM — G89.29 CHRONIC BILATERAL LOW BACK PAIN: Status: ACTIVE | Noted: 2024-10-15

## 2024-10-17 PROBLEM — M51.369 LUMBAR DEGENERATIVE DISC DISEASE: Status: ACTIVE | Noted: 2024-10-15

## 2024-11-19 ENCOUNTER — TELEPHONE (OUTPATIENT)
Dept: PAIN MEDICINE | Facility: CLINIC | Age: 34
End: 2024-11-19

## 2024-11-19 NOTE — TELEPHONE ENCOUNTER
Caller: Rachel Srinivasan    Relationship to patient: Self    Best call back number: [phone] (home)     Patient is needing: PATIENT CALLED ASKING FOR ARRIVAL TIME FOR 11/22/24 PROCEDURE

## 2024-11-21 NOTE — DISCHARGE INSTRUCTIONS
[facility] - Post-procedure Instructions          --  While there are no absolute restrictions, it is recommended that you do not perform strenuous activity today. In the morning, you may resume your level of activity as before your block.    --  If you have a band-aid at your injection site, please remove it later today. Observe the area for any redness, swelling, pus-like drainage, or a temperature over 101°. If any of these symptoms occur, please call your doctor at [phone]. If after office hours, leave a message and the on-call provider will return your call.    --  Ice may be applied to your injection site. It is recommended you avoid direct heat (heating pad; hot tub) for 1-2 days.    --  Call [facility] at [phone] if you experience persistent headache, persistent bleeding from the injection site, or severe pain not relieved by heat or oral medication.    --  Do not make important decisions today.    --  Due to the effects of the block and/or the I.V. Sedation, DO NOT drive or operate hazardous machinery for 12 hours.  Local anesthetics may cause numbness after procedure and precautions must be taken with regards to operating equipment as well as with walking, even if ambulating with assistance of another person or with an assistive device.    --  Do not drink alcohol for 12 hours.    -- You may return to work tomorrow, or as directed by your referring doctor.    --  Occasionally you may notice a slight increase in your pain after the procedure. This should start to improve within the next 24-48 hours. Radiofrequency ablation procedure pain may last 3-4 weeks.    --  It may take as long as 3-4 days before you notice a gradual improvement in your pain and/or other symptoms.    -- You may continue to take your prescribed pain medication as needed.    --  Some normal possible side effects of steroid use could include fluid retention, increased blood sugar, dull headache,  increased sweating, increased appetite, mood swings and flushing.    --  Diabetics are recommended to watch their blood glucose level closely for 24-48 hours after the injection.    --  Must stay in PACU for 20 min upon arrival and prove no leg weakness before being discharged.    --  IN THE EVENT OF A LIFE THREATENING EMERGENCY, (CHEST PAIN, BREATHING DIFFICULTIES, PARALYSIS…) YOU SHOULD GO TO YOUR NEAREST EMERGENCY ROOM.    --  You should be contacted by our office within 2-3 days to schedule follow up or next appointment date.  If not contacted within 7 days, please call the office at [phone]     If you have even 1 hour of relief, these injections are considered successful.

## 2024-11-21 NOTE — H&P
[facility]   HISTORY AND PHYSICAL    Patient Name: Rachel Srinivasan  : 1990  MRN: [id number]  Primary Care Physician:  Kerrie Velez APRN  Date of admission: 2024    Subjective   Subjective     Chief Complaint: Low back pain chronic    History of Present Illness  Chronic axial low back pain that has not improved with conservative therapies.      Review of Systems   Constitutional:  Negative for chills and fever.   Respiratory:  Negative for cough and shortness of breath.    Musculoskeletal:  Positive for back pain.        Personal History     Past Medical History:   Diagnosis Date    Anemia     Antisynthetase syndrome     follows w/Dr at     Arthritis     Cervical disc disorder     Chronic sore throat     advised by PCP to have sleep study    Depression     Esophageal varices     Fibromyalgia, primary     Fibrosis of liver     GERD (gastroesophageal reflux disease)     d/t steroids    ILD (interstitial lung disease)     had normal PFTs, to have CT    Infectious mononucleosis     Joint pain     Kidney stones     history    Low back pain     Migraine     Myositis     Neck pain     Primary biliary cholangitis     Raynaud's syndrome     Rhabdomyolysis 2014    Shingles        Past Surgical History:   Procedure Laterality Date    ADENOIDECTOMY      COLONOSCOPY W/ POLYPECTOMY N/A 2022    Procedure: Colonoscopy with polypectomy;  Surgeon: Soni Funes DO;  Location: [facility] OR;  Service: Gastroenterology;  Laterality: N/A;  sigmoid polyp x1  rectal polyp x1    D & C HYSTEROSCOPY N/A 6/10/2021    Procedure: diagnostic hysteroscopy, dilatation and curettage;  Surgeon: Pablito Vo MD;  Location: [facility] OR;  Service: Obstetrics/Gynecology;  Laterality: N/A;    EAR TUBES      ENDOSCOPY N/A 2022    Procedure: Esophagogastroduodenoscopy with biopsy ;  Surgeon: Soni Funes DO;  Location: [facility] OR;  Service: Gastroenterology;  Laterality: N/A;  nereida test  esophageal  varices  gastritis    ENDOSCOPY W/ BANDING N/A 10/6/2023    Procedure: ESOPHAGOGASTRODUODENOSCOPY WITH BANDING;  Surgeon: Harpal Pinto MD;  Location:  [facility] OR;  Service: Gastroenterology;  Laterality: N/A;  PHG  Esophageal varices    ENDOSCOPY W/ BANDING N/A 10/20/2023    Procedure: ESOPHAGOGASTRODUODENOSCOPY WITH BANDING;  Surgeon: Harpal Pinto MD;  Location:  [facility] OR;  Service: Gastroenterology;  Laterality: N/A;  PHG  banding x2  esophageal varies    EYE SURGERY Right     eyelid reconstruction    MUSCLE BIOPSY  2014    thigh right    SKIN BIOPSY      right thigh    VENOUS ACCESS DEVICE (PORT) INSERTION N/A 7/6/2018    Procedure: INSERTION VENOUS ACCESS DEVICE;  Surgeon: Bo Thomas MD;  Location:  [facility] OR;  Service: General    VENOUS ACCESS DEVICE (PORT) REMOVAL Right 2/7/2020    Procedure: REMOVAL VENOUS ACCESS DEVICE;  Surgeon: Bo Thomas MD;  Location: [facility] OR;  Service: General;  Laterality: Right;  REMOVAL VENOUS ACCESS DEVICE       Family History: family history includes COPD in her father; Cancer in her father, maternal grandmother, and sister; Cervical cancer in her brother; Hyperlipidemia in her mother; Hypertension in her mother; Lung cancer in her father; Throat cancer in her father. Otherwise pertinent FHx was reviewed and not pertinent to current issue.    Social History:  reports that she quit smoking about 10 years ago. Her smoking use included cigarettes. She started smoking about 25 years ago. She has a 7.5 pack-year smoking history. She has been exposed to tobacco smoke. She has never used smokeless tobacco. She reports that she does not currently use alcohol. She reports that she does not use drugs.    Home Medications:  DULoxetine, Iron (Ferrous Sulfate), TiZANidine, butalbital-acetaminophen-caffeine, carvedilol, fenofibrate, gabapentin, hydrOXYzine, promethazine, riTUXimab, spironolactone, and ursodiol    Allergies:  Allergies   Allergen Reactions    Sumatriptan  Nausea Only and Unknown - High Severity     Chest pain  Other reaction(s): Nausea Only, Unknown  Chest pain  Chest pain         Objective    Objective     Vitals:   Temp:  [97 °F (36.1 °C)] 97 °F (36.1 °C)  Heart Rate:  [93] 93  Resp:  [16] 16  BP: (119)/(95) 119/95    Physical Exam  Constitutional:       General: She is not in acute distress.  Pulmonary:      Effort: Pulmonary effort is normal. No respiratory distress.   Skin:     General: Skin is warm and dry.   Neurological:      Mental Status: She is alert.   Psychiatric:         Mood and Affect: Mood normal.         Thought Content: Thought content normal.         Result Review    Result Review:  I have personally reviewed the results from the time of this admission to 11/22/2024 10:28 EST and agree with these findings:  []  Laboratory list / accordion  []  Microbiology  []  Radiology  []  EKG/Telemetry   []  Cardiology/Vascular   []  Pathology  []  Old records  []  Other:  Most notable findings include: No new      Assessment & Plan   Assessment / Plan     Brief Patient Summary:  Rachel Srinivasan is a 34 y.o. female who has chronic axial low back pain    Active Hospital Problems:  Active Hospital Problems    Diagnosis     Chronic bilateral low back pain     Lumbar degenerative disc disease      Plan: Bilateral lumbar medial branch blocks at L4-S1      VTE Prophylaxis:  No VTE prophylaxis order currently exists.    Fouzia Fabian MD

## 2024-11-22 ENCOUNTER — HOSPITAL ENCOUNTER (OUTPATIENT)
Dept: GENERAL RADIOLOGY | Facility: SURGERY CENTER | Age: 34
End: 2024-11-22
Payer: COMMERCIAL

## 2024-11-22 ENCOUNTER — HOSPITAL ENCOUNTER (OUTPATIENT)
Facility: SURGERY CENTER | Age: 34
Setting detail: HOSPITAL OUTPATIENT SURGERY
Discharge: HOME | End: 2024-11-22
Attending: ANESTHESIOLOGY | Admitting: ANESTHESIOLOGY
Payer: COMMERCIAL

## 2024-11-22 VITALS
BODY MASS INDEX: 25.76 KG/M2 | DIASTOLIC BLOOD PRESSURE: 65 MMHG | WEIGHT: 140 LBS | RESPIRATION RATE: 16 BRPM | SYSTOLIC BLOOD PRESSURE: 108 MMHG | HEIGHT: 62 IN | HEART RATE: 80 BPM | OXYGEN SATURATION: 98 % | TEMPERATURE: 98.2 F

## 2024-11-22 DIAGNOSIS — Z41.9 SURGERY, ELECTIVE: ICD-10-CM

## 2024-11-22 DIAGNOSIS — M54.50 CHRONIC BILATERAL LOW BACK PAIN, UNSPECIFIED WHETHER SCIATICA PRESENT: ICD-10-CM

## 2024-11-22 DIAGNOSIS — G89.29 CHRONIC BILATERAL LOW BACK PAIN, UNSPECIFIED WHETHER SCIATICA PRESENT: ICD-10-CM

## 2024-11-22 DIAGNOSIS — M51.369 LUMBAR DEGENERATIVE DISC DISEASE: ICD-10-CM

## 2024-11-22 PROBLEM — M47.816 LUMBAR FACET JOINT SYNDROME: Status: ACTIVE | Noted: 2024-11-22

## 2024-11-22 LAB
B-HCG UR QL: NEGATIVE
EXPIRATION DATE: NORMAL
INTERNAL NEGATIVE CONTROL: NEGATIVE
INTERNAL POSITIVE CONTROL: POSITIVE
Lab: NORMAL

## 2024-11-22 PROCEDURE — 77002 NEEDLE LOCALIZATION BY XRAY: CPT

## 2024-11-22 PROCEDURE — 25010000002 BUPIVACAINE (PF) 0.25 % SOLUTION 10 ML VIAL: Performed by: ANESTHESIOLOGY

## 2024-11-22 PROCEDURE — 81025 URINE PREGNANCY TEST: CPT | Performed by: ANESTHESIOLOGY

## 2024-11-22 PROCEDURE — 64493 INJ PARAVERT F JNT L/S 1 LEV: CPT | Performed by: ANESTHESIOLOGY

## 2024-11-22 PROCEDURE — 64494 INJ PARAVERT F JNT L/S 2 LEV: CPT | Performed by: ANESTHESIOLOGY

## 2024-11-22 PROCEDURE — 76000 FLUOROSCOPY <1 HR PHYS/QHP: CPT

## 2024-11-22 PROCEDURE — 25010000002 LIDOCAINE PF 1% 1 % SOLUTION 5 ML VIAL: Performed by: ANESTHESIOLOGY

## 2024-11-22 RX ORDER — DULOXETIN HYDROCHLORIDE 30 MG/1
30 CAPSULE, DELAYED RELEASE ORAL
COMMUNITY
Start: 2024-11-13

## 2024-11-22 RX ORDER — HYDROXYZINE HYDROCHLORIDE 50 MG/1
1 TABLET, FILM COATED ORAL 2 TIMES DAILY
COMMUNITY

## 2024-11-22 RX ORDER — DIAZEPAM 5 MG/1
10 TABLET ORAL ONCE
Status: COMPLETED | OUTPATIENT
Start: 2024-11-22 | End: 2024-11-22

## 2024-11-22 RX ADMIN — DIAZEPAM 10 MG: 5 TABLET ORAL at 10:10

## 2024-11-22 NOTE — OP NOTE
Lumbar Medial Branch Blockade at  Bilateral L4-S1  [facility]      PREOPERATIVE DIAGNOSIS:  Lumbar spondylosis without myelopathy    POSTOPERATIVE DIAGNOSIS:  Lumbar spondylosis without myelopathy    PROCEDURE:   Diagnostic Lumbar Medial Branch Nerve Blockades, with fluoroscopy:  at the L4, L5 transverse processes and the sacral alar groove)   95583-87 -- Lumbar Facet blocks, 1st Level  47063-01 -- Lumbar Facet blocks, 2nd  Level     PRE-PROCEDURE DISCUSSION WITH PATIENT:    Risks and complications were discussed with the patient prior to starting the procedure and informed consent was obtained.      SURGEON:  Fouzia Fabian MD    REASON FOR PROCEDURE:    The patient complains of pain that seems to have a significant axial component and Painful area identified on exam under fluoroscopy    SEDATION:  Anxiolysis was used for this procedure which included PO Valium 10mg  ANESTHETIC:  0.25% bupivacaine  STEROID:  None  TOTAL VOLUME OF SOLUTION:  6ml    DESCRIPTON OF PROCEDURE:  After obtaining informed consent, IV access was not obtained in the preoperative area.   The patient was taken to the operating room.  The patient was placed in the prone position with a pillow under the abdomen. All pressure points were well padded.  Heart rate, blood pressure, and pulse oximeter were monitored.  The patient was monitored and sedated by the RN under my direction. The lumbosacral area was prepped with Chloraprep and draped in a sterile fashion.     AP fluoroscopic image was used to visualize the junction of the right S1 superior articular process with the sacral ala.  The image was then optimized to maximize visualization of the junctions of the L3, L4, L5 superior articular processes with the transverse processes.  The skin and subcutaneous tissue over the areas was anesthetized with 1% lidocaine.  A 22-gauge spinal needle was then advanced percutaneously through the anesthetized skin tracts under  fluoroscopic guidance in a coaxial view to contact periosteum at the target sites.  After negative aspiration, a volume of 1 mL of the local anesthetic  was injected without resistance at each of the target sites.      The same procedure was then performed on the contralateral side in the exact same fashion.        Onset of analgesia was noted.  Vital signs remained stable throughout.      ESTIMATED BLOOD LOSS:  <5 mL  SPECIMENS:  none    COMPLICATIONS:   No complications were noted.    TOLERANCE & DISCHARGE CONDITION:    The patient tolerated the procedure well.  The patient was transported to the recovery area without difficulties.  The patient was discharged to home under the care of family in stable and satisfactory condition.    Pre-procedure pain score: 4/10 at rest, 6/10 with activity  Post-procedure pain score: 0/10    PLAN OF CARE:  The patient was given our standard instruction sheet.  We discussed that Lumbar Medial Branch Blockade is a diagnostic procedure in consideration for radiofrequency ablation if two diagnostic procedures prove to be positive for significant benefit.  An alternative plan could be therapeutic lumbar branch blockades.  The patient is asked to keep an account of pain relief after the procedure today.  The patient will  Return to clinic 1-2 wks.  The patient will resume all medications as per the medication reconciliation sheet.

## 2024-11-27 DIAGNOSIS — M62.838 MUSCLE SPASM: ICD-10-CM

## 2024-11-27 RX ORDER — TIZANIDINE HYDROCHLORIDE 4 MG/1
4 CAPSULE, GELATIN COATED ORAL 2 TIMES DAILY PRN
Qty: 45 CAPSULE | Refills: 0 | Status: SHIPPED | OUTPATIENT
Start: 2024-11-27

## 2024-12-27 ENCOUNTER — APPOINTMENT (OUTPATIENT)
Dept: GENERAL RADIOLOGY | Facility: SURGERY CENTER | Age: 34
End: 2024-12-27
Payer: COMMERCIAL

## 2025-02-10 ENCOUNTER — OFFICE VISIT (OUTPATIENT)
Dept: PAIN MEDICINE | Facility: CLINIC | Age: 35
End: 2025-02-10
Payer: COMMERCIAL

## 2025-02-10 ENCOUNTER — PREP FOR SURGERY (OUTPATIENT)
Dept: SURGERY | Facility: SURGERY CENTER | Age: 35
End: 2025-02-10
Payer: COMMERCIAL

## 2025-02-10 VITALS
TEMPERATURE: 97.2 F | DIASTOLIC BLOOD PRESSURE: 87 MMHG | HEART RATE: 102 BPM | WEIGHT: 146.2 LBS | OXYGEN SATURATION: 98 % | BODY MASS INDEX: 26.91 KG/M2 | HEIGHT: 62 IN | SYSTOLIC BLOOD PRESSURE: 125 MMHG

## 2025-02-10 DIAGNOSIS — M51.360 DEGENERATION OF INTERVERTEBRAL DISC OF LUMBAR REGION WITH DISCOGENIC BACK PAIN: ICD-10-CM

## 2025-02-10 DIAGNOSIS — M79.7 FIBROMYALGIA: ICD-10-CM

## 2025-02-10 DIAGNOSIS — M54.50 CHRONIC BILATERAL LOW BACK PAIN, UNSPECIFIED WHETHER SCIATICA PRESENT: Primary | ICD-10-CM

## 2025-02-10 DIAGNOSIS — B02.29 POST HERPETIC NEURALGIA: ICD-10-CM

## 2025-02-10 DIAGNOSIS — M54.50 CHRONIC BILATERAL LOW BACK PAIN, UNSPECIFIED WHETHER SCIATICA PRESENT: ICD-10-CM

## 2025-02-10 DIAGNOSIS — G89.29 CHRONIC BILATERAL LOW BACK PAIN, UNSPECIFIED WHETHER SCIATICA PRESENT: ICD-10-CM

## 2025-02-10 DIAGNOSIS — M62.838 MUSCLE SPASM: Primary | ICD-10-CM

## 2025-02-10 DIAGNOSIS — G89.29 CHRONIC BILATERAL LOW BACK PAIN, UNSPECIFIED WHETHER SCIATICA PRESENT: Primary | ICD-10-CM

## 2025-02-10 PROCEDURE — 99214 OFFICE O/P EST MOD 30 MIN: CPT

## 2025-02-10 PROCEDURE — 1159F MED LIST DOCD IN RCRD: CPT

## 2025-02-10 PROCEDURE — 1125F AMNT PAIN NOTED PAIN PRSNT: CPT

## 2025-02-10 PROCEDURE — 1160F RVW MEDS BY RX/DR IN RCRD: CPT

## 2025-02-10 RX ORDER — PROMETHAZINE HYDROCHLORIDE 25 MG/1
25 TABLET ORAL EVERY 6 HOURS PRN
COMMUNITY
Start: 2025-02-03

## 2025-02-10 NOTE — H&P (VIEW-ONLY)
CHIEF COMPLAINT  Back pain     Subjective   Rachel Srinivasan is a 34 y.o. female  who presents to the office for follow-up of procedure.  She completed a Bilateral L4-S1 MBB on 11/22/24 performed by Dr. Fabian for management of low back pain. Patient reports 85% relief from the procedure for several hours. She notes that her overall pain level was improved following the procedure and that she was able to rest better.     Today pain is 4/10VAS in severity. She continues to complain of axial low back pain that radiate across her low back in bandlike pattern. Denies radicular pain. . Describes this pain as a nearly continuous aching, burning, and throbbing. Pain is worsened by prolonged sitting or positioned and lying flat. Pain improves with rest/reposition, heat, and medications.     Current pain regimen includes OTC Tylenol PRN and Cymbalta 60mg daily (for fibro pain). She has been restarted on Gabapentin 300mg 1-2/day PRN for anxiety. She was prescribed Tizanidine at her last office visit and reports that this was helpful to her pain. She notes intermittent drowsiness with this medication.     No relief with Flexeril. She has taken Tramadol and Tylenol with Codeine in the past for past which offered some relief.      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 Last seen 1/23/25. Dr. Sethi notes patient has been doing well since her last visit.  She is on long-term immunosuppression with Rituximab receives IV iron as needed.  Liver biopsy on 5/2023 was negative for fibrosis. CT surveillance scan every 6 months of her abdomen until her lesions are stable.  Repeat MRI ordered at this time due in February.  Recommended continue iron tablets, Ursodiol, and spironolactone.     Procedures:  11/22/24 - Bilateral L4-S1 MBB - 85% relief for several hours     Back Pain  This is a chronic problem. The current episode started more than 1 year ago. The problem occurs  constantly. The problem is unchanged. The pain is present in the lumbar spine and thoracic spine. The quality of the pain is described as aching and burning. The pain does not radiate. The pain is at a severity of 4/10. The symptoms are aggravated by sitting, position and lying down. Associated symptoms include headaches and numbness. Pertinent negatives include no abdominal pain, chest pain, dysuria, fever or weakness. She has tried muscle relaxant and heat for the symptoms.     PEG Assessment   What number best describes your pain on average in the past week?4  What number best describes how, during the past week, pain has interfered with your enjoyment of life?3  What number best describes how, during the past week, pain has interfered with your general activity?  3    Review of Pertinent Medical Data ---      The following portions of the patient's history were reviewed and updated as appropriate: allergies, current medications, past family history, past medical history, past social history, past surgical history, and problem list.    Review of Systems   Constitutional:  Positive for fatigue. Negative for activity change, chills and fever.   HENT:  Negative for congestion.    Eyes:  Negative for visual disturbance.   Respiratory:  Negative for chest tightness and shortness of breath.    Cardiovascular:  Negative for chest pain.   Gastrointestinal:  Positive for constipation. Negative for abdominal pain and diarrhea.   Genitourinary:  Negative for difficulty urinating, dyspareunia and dysuria.   Musculoskeletal:  Positive for back pain.   Neurological:  Positive for dizziness, numbness and headaches. Negative for weakness and light-headedness.   Psychiatric/Behavioral:  Positive for sleep disturbance. Negative for agitation, self-injury and suicidal ideas. The patient is not nervous/anxious.      I have reviewed and confirmed the accuracy of the ROS as documented by the MA/LPN/RN NURY Warner    "  Vitals:    02/10/25 1311   BP: 125/87   Pulse: 102   Temp: 97.2 °F (36.2 °C)   SpO2: 98%   Weight: 66.3 kg (146 lb 3.2 oz)   Height: 157.5 cm (62\")   PainSc:   4   PainLoc: Back     Objective   Physical Exam  Constitutional:       Appearance: Normal appearance.   HENT:      Head: Normocephalic.   Cardiovascular:      Rate and Rhythm: Normal rate and regular rhythm.   Pulmonary:      Effort: Pulmonary effort is normal.      Breath sounds: Normal breath sounds.   Musculoskeletal:      Cervical back: Normal range of motion.      Lumbar back: Tenderness and bony tenderness present. Decreased range of motion. Negative right straight leg raise test and negative left straight leg raise test.      Comments: + lumbar facet loading/tenderness  - SI joint tenderness bilaterally  - NISA'S test bilaterally    Skin:     General: Skin is warm and dry.      Capillary Refill: Capillary refill takes less than 2 seconds.   Neurological:      General: No focal deficit present.      Mental Status: She is alert and oriented to person, place, and time.   Psychiatric:         Mood and Affect: Mood normal.         Behavior: Behavior normal.         Thought Content: Thought content normal.         Cognition and Memory: Cognition normal.       Assessment & Plan   Diagnoses and all orders for this visit:    1. Muscle spasm (Primary)  -     tiZANidine (ZANAFLEX) 4 MG tablet; Take 1 tablet by mouth 2 (Two) Times a Day As Needed for Muscle Spasms.  Dispense: 45 tablet; Refill: 0    2. Chronic bilateral low back pain, unspecified whether sciatica present    3. Degeneration of intervertebral disc of lumbar region with discogenic back pain    4. Post herpetic neuralgia    5. Fibromyalgia      Rachel Srinivasan reports a pain score of 4.  Given her pain assessment as noted, treatment options were discussed and the following options were decided upon as a follow-up plan to address the patient's pain: continuation of current treatment plan for pain and " "prescription for non-opiod analgesics.    --- Repeat Bilateral L4-S1 MBB   -------  Education about Medial Branch Blockade and RF Therapy:    This medial branch blockade (MBB) suggested is intended for diagnostic purposes, with the intent of offering the patient Radiofrequency thermal rhizotomy (RF) if the MBB is diagnostically effective.  The diagnostic blockade is necessary to determine the likelihood that RF therapy could be efficacious in providing long term relief to the patient.    Medial branches are sensory nerve branches that connect to a facet joint and transmit sensations & pain signals from that joint.  Facet is a term for the type of joints found in the spine.  Medial branches are the nerves that go to a facet, and therefore are also sometimes called \"facet joint nerves\" (FJNs).      In a medial branch blockade procedure, xray fluoroscopy is used to verify the locations of the outside of the joint lines which are being targeted.  Under xray guidance, needles are placed to these areas.  Contrast dye is injected to confirm proper placement, with dye flowing over the joint area, and to ensure that the dye does not flow into unintended areas such as a vein.  When this is confirmed, local anesthetic is injected to block the medial branch at that joint level.      If MBBs are diagnostically successful in blocking pain, then the patient is most likely a great candidate for Radiofrequency of those facet joint nerves.  In the RF procedure, needles are placed to the joint lines in the same fashion, and after testing, the needle tips are heated to thermally treat the nerves, blocking the nerves by in essence damaging the nerves with the heat treatment(non-pulsed).       Medically, a successful RF procedure should provide a patient with 50% pain relief or more for at least 6 months.  Clinical experience suggests that successful patients receive relief more in the range of 12 months on average.  We also discussed " that a fortunate minority of patients receive therapeutic success from the MBB, and may not require RF ablation.  If a patient receives more than 8 weeks of relief from MBB, then occasional repeat MBB for therapeutic purposes is a very reasonable alternative therapy.  This course of therapy is consistent with our LCDs according to our CMS  in the area, and therefore other insurance providers should follow accordingly.  We will monitor our patients to screen for these therapeutic responders and will offer RF therapy only when necessary.      We discussed that MBB & RF are not without risks.  Guidelines regarding anticoagulant use & neuraxial procedures will be respected.  Patients that are ill or otherwise may be at risk for sepsis will not have their spines accessed by neuraxial injections of any type.  This patient will not be offered these therapies if there is an increased risk.   We discussed that there is a risk of postprocedural pain and also a risk of worsening of clinical picture with these procedures as with any neuraxial procedure.    -------  --- Continue Tizanidine. Refill sent to pharmacy.   --- Follow-up for procedure     Diagnostic Facet Joint Procedure:   MBB   The confirmatory diagnostic facet joint procedure is considered medically reasonable and necessary for the diagnosis and treatment of chronic pain for this patient due to the patient meeting all of the following criteria:    1. Moderate to severe chronic neck or low back pain, predominantly axial, that causes functional deficit measured on pain or disability scale.  2. Pain present for minimum of 3 months with documented failure to respond to noninvasive conservative management (as tolerated)  3. Absence of untreated radiculopathy or neurogenic claudication (except for radiculopathy caused by facet joint synovial cyst)  4. There is no non-facet pathology per clinical assessment or radiology studies that could explain the source of  the patient’s pain, including but not limited to fracture, tumor, infection, or significant deformity.    The patient has also shown a consistent positive response of at least 80% relief of primary (index) pain (with the duration of relief being consistent with the agent used).      BLAYNE REPORT  As the clinician, I personally reviewed the BLAYNE from 2/10/25 while the patient was in the office today.    Dictated utilizing Dragon dictation.

## 2025-03-06 NOTE — DISCHARGE INSTRUCTIONS
[facility] [place] [facility] - Post-procedure Instructions          --  While there are no absolute restrictions, it is recommended that you do not perform strenuous activity today. In the morning, you may resume your level of activity as before your block.    --  If you have a band-aid at your injection site, please remove it later today. Observe the area for any redness, swelling, pus-like drainage, or a temperature over 101°. If any of these symptoms occur, please call your doctor at [phone]. If after office hours, leave a message and the on-call provider will return your call.    --  Ice may be applied to your injection site. It is recommended you avoid direct heat (heating pad; hot tub) for 1-2 days.    --  Call [facility] [place] at [phone] if you experience persistent headache, persistent bleeding from the injection site, or severe pain not relieved by heat or oral medication.    --  Do not make important decisions today.    --  Due to the effects of the block and/or the I.V. Sedation, DO NOT drive or operate hazardous machinery for 12 hours.  Local anesthetics may cause numbness after procedure and precautions must be taken with regards to operating equipment as well as with walking, even if ambulating with assistance of another person or with an assistive device.    --  Do not drink alcohol for 12 hours.    -- You may return to work tomorrow, or as directed by your referring doctor.    --  Occasionally you may notice a slight increase in your pain after the procedure. This should start to improve within the next 24-48 hours. Radiofrequency ablation procedure pain may last 3-4 weeks.    --  It may take as long as 3-4 days before you notice a gradual improvement in your pain and/or other symptoms.    -- You may continue to take your prescribed pain medication as needed.    --  Some normal possible side effects of steroid use could include fluid retention, increased blood sugar, dull headache,  increased sweating, increased appetite, mood swings and flushing.    --  Diabetics are recommended to watch their blood glucose level closely for 24-48 hours after the injection.    --  Must stay in PACU for 20 min upon arrival and prove no leg weakness before being discharged.    --  IN THE EVENT OF A LIFE THREATENING EMERGENCY, (CHEST PAIN, BREATHING DIFFICULTIES, PARALYSIS…) YOU SHOULD GO TO YOUR NEAREST EMERGENCY ROOM.    --  You should be contacted by our office within 2-3 days to schedule follow up or next appointment date.  If not contacted within 7 days, please call the office at [phone]     If you have even 1 hour of relief, these injections are considered successful.

## 2025-03-07 ENCOUNTER — HOSPITAL ENCOUNTER (OUTPATIENT)
Dept: GENERAL RADIOLOGY | Facility: SURGERY CENTER | Age: 35
End: 2025-03-07
Payer: COMMERCIAL

## 2025-03-07 ENCOUNTER — HOSPITAL ENCOUNTER (OUTPATIENT)
Facility: SURGERY CENTER | Age: 35
Setting detail: HOSPITAL OUTPATIENT SURGERY
Discharge: HOME | End: 2025-03-07
Attending: ANESTHESIOLOGY | Admitting: ANESTHESIOLOGY
Payer: COMMERCIAL

## 2025-03-07 VITALS
HEART RATE: 90 BPM | OXYGEN SATURATION: 99 % | HEIGHT: 62 IN | TEMPERATURE: 97.7 F | BODY MASS INDEX: 26.87 KG/M2 | RESPIRATION RATE: 16 BRPM | SYSTOLIC BLOOD PRESSURE: 125 MMHG | WEIGHT: 146 LBS | DIASTOLIC BLOOD PRESSURE: 90 MMHG

## 2025-03-07 DIAGNOSIS — G89.29 CHRONIC BILATERAL LOW BACK PAIN, UNSPECIFIED WHETHER SCIATICA PRESENT: ICD-10-CM

## 2025-03-07 DIAGNOSIS — M51.369 LUMBAR DEGENERATIVE DISC DISEASE: ICD-10-CM

## 2025-03-07 DIAGNOSIS — M54.50 CHRONIC BILATERAL LOW BACK PAIN, UNSPECIFIED WHETHER SCIATICA PRESENT: ICD-10-CM

## 2025-03-07 DIAGNOSIS — Z41.9 SURGERY, ELECTIVE: ICD-10-CM

## 2025-03-07 LAB
B-HCG UR QL: NEGATIVE
EXPIRATION DATE: NORMAL
INTERNAL NEGATIVE CONTROL: NORMAL
INTERNAL POSITIVE CONTROL: NORMAL
Lab: NORMAL

## 2025-03-07 PROCEDURE — 64494 INJ PARAVERT F JNT L/S 2 LEV: CPT | Performed by: ANESTHESIOLOGY

## 2025-03-07 PROCEDURE — 77002 NEEDLE LOCALIZATION BY XRAY: CPT

## 2025-03-07 PROCEDURE — 64493 INJ PARAVERT F JNT L/S 1 LEV: CPT | Performed by: ANESTHESIOLOGY

## 2025-03-07 PROCEDURE — 76000 FLUOROSCOPY <1 HR PHYS/QHP: CPT

## 2025-03-07 PROCEDURE — 81025 URINE PREGNANCY TEST: CPT | Performed by: ANESTHESIOLOGY

## 2025-03-07 PROCEDURE — 25010000002 LIDOCAINE PF 1% 1 % SOLUTION 5 ML VIAL: Performed by: ANESTHESIOLOGY

## 2025-03-07 PROCEDURE — 25010000002 BUPIVACAINE (PF) 0.25 % SOLUTION 10 ML VIAL: Performed by: ANESTHESIOLOGY

## 2025-03-07 RX ORDER — DIAZEPAM 5 MG/1
10 TABLET ORAL ONCE
Status: DISCONTINUED | OUTPATIENT
Start: 2025-03-07 | End: 2025-03-07 | Stop reason: HOSPADM

## 2025-03-07 NOTE — OP NOTE
Lumbar Medial Branch Blockade at  Bilateral L3-L5 (Typo on previous procedure note, same levels performed today)  [facility]      PREOPERATIVE DIAGNOSIS:  Lumbar spondylosis without myelopathy    POSTOPERATIVE DIAGNOSIS:  Lumbar spondylosis without myelopathy    PROCEDURE:   Diagnostic Lumbar Medial Branch Nerve Blockades, with fluoroscopy:  at the L3, L4, L5 transverse processes)   22260-98 -- Lumbar Facet blocks, 1st Level  84727-32 -- Lumbar Facet blocks, 2nd  Level     PRE-PROCEDURE DISCUSSION WITH PATIENT:    Risks and complications were discussed with the patient prior to starting the procedure and informed consent was obtained.      SURGEON:  Fouzia Fabian MD    REASON FOR PROCEDURE:    The patient complains of pain that seems to have a significant axial component and Previous diagnostic positivity of a Lumbar Medial Branch Blockade at the same levels    SEDATION:  No sedation was used for this procedure  ANESTHETIC:  0.25% bupivacaine  STEROID:  None  TOTAL VOLUME OF SOLUTION:  6ml    DESCRIPTON OF PROCEDURE:  After obtaining informed consent, IV access was not obtained in the preoperative area.   The patient was taken to the operating room.  The patient was placed in the prone position with a pillow under the abdomen. All pressure points were well padded.  Heart rate, blood pressure, and pulse oximeter were monitored.  The patient was monitored and sedated by the RN under my direction. The lumbosacral area was prepped with Chloraprep and draped in a sterile fashion.     AP fluoroscopic image was used to visualize the junction of the right S1 superior articular process with the sacral ala.  The image was then optimized to maximize visualization of the junctions of the L3, L4, L5 superior articular processes with the transverse processes.  The skin and subcutaneous tissue over the areas was anesthetized with 1% lidocaine.  A 22-gauge spinal needle was then advanced percutaneously through  the anesthetized skin tracts under fluoroscopic guidance in a coaxial view to contact periosteum at the target sites.  After negative aspiration, a volume of 1 mL of the local anesthetic  was injected without resistance at each of the target sites.      The same procedure was then performed on the contralateral side in the exact same fashion.        Onset of analgesia was noted.  Vital signs remained stable throughout.      ESTIMATED BLOOD LOSS:  <5 mL  SPECIMENS:  none    COMPLICATIONS:   No complications were noted.    TOLERANCE & DISCHARGE CONDITION:    The patient tolerated the procedure well.  The patient was transported to the recovery area without difficulties.  The patient was discharged to home under the care of family in stable and satisfactory condition.    Pre-procedure pain score: 5/10 at rest, 3/10 with activity  Post-procedure pain score: 0/10    PLAN OF CARE:  The patient was given our standard instruction sheet.  We discussed that Lumbar Medial Branch Blockade is a diagnostic procedure in consideration for radiofrequency ablation if two diagnostic procedures prove to be positive for significant benefit.  An alternative plan could be therapeutic lumbar branch blockades.  The patient is asked to keep an account of pain relief after the procedure today.  The patient will  Return to clinic 1-2 wks.  The patient will resume all medications as per the medication reconciliation sheet.

## 2025-03-25 ENCOUNTER — OFFICE VISIT (OUTPATIENT)
Age: 35
End: 2025-03-25
Payer: COMMERCIAL

## 2025-03-25 ENCOUNTER — PREP FOR SURGERY (OUTPATIENT)
Dept: SURGERY | Facility: SURGERY CENTER | Age: 35
End: 2025-03-25
Payer: COMMERCIAL

## 2025-03-25 VITALS
HEART RATE: 99 BPM | OXYGEN SATURATION: 100 % | SYSTOLIC BLOOD PRESSURE: 116 MMHG | BODY MASS INDEX: 27.53 KG/M2 | TEMPERATURE: 97.2 F | HEIGHT: 62 IN | WEIGHT: 149.6 LBS | DIASTOLIC BLOOD PRESSURE: 72 MMHG

## 2025-03-25 DIAGNOSIS — G89.29 CHRONIC BILATERAL LOW BACK PAIN, UNSPECIFIED WHETHER SCIATICA PRESENT: Primary | ICD-10-CM

## 2025-03-25 DIAGNOSIS — M62.838 MUSCLE SPASM: ICD-10-CM

## 2025-03-25 DIAGNOSIS — B02.29 POST HERPETIC NEURALGIA: ICD-10-CM

## 2025-03-25 DIAGNOSIS — M54.50 CHRONIC BILATERAL LOW BACK PAIN, UNSPECIFIED WHETHER SCIATICA PRESENT: Primary | ICD-10-CM

## 2025-03-25 DIAGNOSIS — M79.7 FIBROMYALGIA: ICD-10-CM

## 2025-03-25 DIAGNOSIS — M51.360 DEGENERATION OF INTERVERTEBRAL DISC OF LUMBAR REGION WITH DISCOGENIC BACK PAIN: ICD-10-CM

## 2025-03-25 PROCEDURE — 1125F AMNT PAIN NOTED PAIN PRSNT: CPT

## 2025-03-25 PROCEDURE — 99214 OFFICE O/P EST MOD 30 MIN: CPT

## 2025-03-25 PROCEDURE — 1160F RVW MEDS BY RX/DR IN RCRD: CPT

## 2025-03-25 PROCEDURE — 1159F MED LIST DOCD IN RCRD: CPT

## 2025-03-25 RX ORDER — CHOLESTYRAMINE 4 G/9G
4 POWDER, FOR SUSPENSION ORAL
COMMUNITY
Start: 2025-03-13 | End: 2025-07-11

## 2025-03-25 NOTE — PROGRESS NOTES
CHIEF COMPLAINT  Back pain    Subjective   Rachel Srinivasan is a 34 y.o. female  who presents to the office for follow-up of procedure.  She completed a Bilateral L3-L5 MBB on  3/7/25 performed by Dr. Fabian for management of low back pain. Patient reports 90% relief from the procedure for 2 hours. She  repors that her overall pain level was improved following the procedure and she was able to sit for longer periods without experiencing increased pain.     Today pain is 7/10VAS in severity. Her main complaint is axial low back pain that radiate across her low back in bandlike pattern. Denies radicular pain. Describes this pain as a nearly continuous aching, burning, and throbbing. Pain is worsened by prolonged sitting or positioned and lying flat. Pain improves with rest/reposition, heat, and medications.     Current pain regimen includes OTC Tylenol PRN,Cymbalta 60mg daily (for fibro pain), and Tizanidine 4mg PRN (noes intermittent drowsiness) . She has been restarted on Gabapentin 300mg 1-2/day PRN for anxiety.      No relief with Flexeril. She has taken Tramadol and Tylenol with Codeine in the past for past which offered some relief.      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 She receives lab work every 3 months.  Liver biopsy 5/2023. MRI  abdomen completed on 3/10/25. She is maintained on iron tablets, Ursodiol, and spironolactone.      Procedures:  3/7/25 - Bilateral L4-S1 MBB - 90% relief for 2 hours  11/22/24 - Bilateral L4-S1 MBB - 85% relief for several hours    Back Pain  This is a chronic problem. The current episode started more than 1 year ago. The problem occurs constantly. The problem is unchanged. The pain is present in the lumbar spine and thoracic spine. The quality of the pain is described as aching and burning. The pain does not radiate. The pain is at a severity of 4/10. The symptoms are aggravated by sitting, position and lying down.  "Associated symptoms include abdominal pain, headaches and numbness. Pertinent negatives include no chest pain, dysuria, fever or weakness. She has tried muscle relaxant and heat for the symptoms.      PEG Assessment   What number best describes your pain on average in the past week?4  What number best describes how, during the past week, pain has interfered with your enjoyment of life?0  What number best describes how, during the past week, pain has interfered with your general activity?  4    Review of Pertinent Medical Data ---      The following portions of the patient's history were reviewed and updated as appropriate: allergies, current medications, past family history, past medical history, past social history, past surgical history, and problem list.    Review of Systems   Constitutional:  Positive for fatigue. Negative for activity change, chills and fever.   HENT:  Negative for congestion.    Eyes:  Negative for visual disturbance.   Respiratory:  Negative for chest tightness and shortness of breath.    Cardiovascular:  Negative for chest pain.   Gastrointestinal:  Positive for abdominal pain and constipation. Negative for diarrhea.   Genitourinary:  Negative for difficulty urinating, dyspareunia and dysuria.   Musculoskeletal:  Positive for back pain.   Neurological:  Positive for numbness and headaches. Negative for dizziness, weakness and light-headedness.   Psychiatric/Behavioral:  Positive for sleep disturbance. Negative for agitation, self-injury and suicidal ideas. The patient is not nervous/anxious.      I have reviewed and confirmed the accuracy of the ROS as documented by the MA/LPN/RN NURY Warner    Vitals:    03/25/25 1454   BP: 116/72   Pulse: 99   Temp: 97.2 °F (36.2 °C)   SpO2: 100%   Weight: 67.9 kg (149 lb 9.6 oz)   Height: 157.5 cm (62\")   PainSc: 7    PainLoc: Back     Objective   Physical Exam  Constitutional:       Appearance: Normal appearance.   HENT:      Head: " Normocephalic.   Cardiovascular:      Rate and Rhythm: Normal rate and regular rhythm.   Pulmonary:      Effort: Pulmonary effort is normal.      Breath sounds: Normal breath sounds.   Musculoskeletal:      Cervical back: Normal range of motion.      Lumbar back: Tenderness and bony tenderness present. Decreased range of motion. Negative right straight leg raise test and negative left straight leg raise test.      Comments: + lumbar facet loading/tenderness  - SI joint tenderness bilaterally  - NISA'S test bilaterally    Skin:     General: Skin is warm and dry.      Capillary Refill: Capillary refill takes less than 2 seconds.   Neurological:      General: No focal deficit present.      Mental Status: She is alert and oriented to person, place, and time.   Psychiatric:         Mood and Affect: Mood normal.         Behavior: Behavior normal.         Thought Content: Thought content normal.         Cognition and Memory: Cognition normal.       Assessment & Plan   Diagnoses and all orders for this visit:    1. Chronic bilateral low back pain, unspecified whether sciatica present (Primary)    2. Degeneration of intervertebral disc of lumbar region with discogenic back pain    3. Muscle spasm  -     tiZANidine (ZANAFLEX) 4 MG tablet; Take 1 tablet by mouth 2 (Two) Times a Day As Needed for Muscle Spasms.  Dispense: 45 tablet; Refill: 0    4. Post herpetic neuralgia    5. Fibromyalgia      Rachel Srinivasan reports a pain score of 7.  Given her pain assessment as noted, treatment options were discussed and the following options were decided upon as a follow-up plan to address the patient's pain: continuation of current treatment plan for pain and prescription for non-opiod analgesics.    --- Bilateral L4-S1 RFA - patient declines the need for IV sedation with procedure  -------  Education about Medial Branch Blockade and RF Therapy:  This medial branch blockade (MBB) suggested is intended for diagnostic purposes,  "with the intent of offering the patient Radiofrequency thermal rhizotomy (RF) if the MBB is diagnostically effective.  The diagnostic blockade is necessary to determine the likelihood that RF therapy could be efficacious in providing long term relief to the patient.    Medial branches are sensory nerve branches that connect to a facet joint and transmit sensations & pain signals from that joint.  Facet is a term for the type of joints found in the spine.  Medial branches are the nerves that go to a facet, and therefore are also sometimes called \"facet joint nerves\" (FJNs).      In a medial branch blockade procedure, xray fluoroscopy is used to verify the locations of the outside of the joint lines which are being targeted.  Under xray guidance, needles are placed to these areas.  Contrast dye is injected to confirm proper placement, with dye flowing over the joint area, and to ensure that the dye does not flow into unintended areas such as a vein.  When this is confirmed, local anesthetic is injected to block the medial branch at that joint level.      If MBBs are diagnostically successful in blocking pain, then the patient is most likely a great candidate for Radiofrequency of those facet joint nerves.  In the RF procedure, needles are placed to the joint lines in the same fashion, and after testing, the needle tips are heated to thermally treat the nerves, blocking the nerves by in essence damaging the nerves with the heat treatment(non-pulsed).       Medically, a successful RF procedure should provide a patient with 50% pain relief or more for at least 6 months.  Clinical experience suggests that successful patients receive relief more in the range of 12 months on average.  We also discussed that a fortunate minority of patients receive therapeutic success from the MBB, and may not require RF ablation.  If a patient receives more than 8 weeks of relief from MBB, then occasional repeat MBB for therapeutic purposes " is a very reasonable alternative therapy.  This course of therapy is consistent with our LCDs according to our CMS  in the area, and therefore other insurance providers should follow accordingly.  We will monitor our patients to screen for these therapeutic responders and will offer RF therapy only when necessary.      We discussed that MBB & RF are not without risks.  Guidelines regarding anticoagulant use & neuraxial procedures will be respected.  Patients that are ill or otherwise may be at risk for sepsis will not have their spines accessed by neuraxial injections of any type.  This patient will not be offered these therapies if there is an increased risk.   We discussed that there is a risk of postprocedural pain and also a risk of worsening of clinical picture with these procedures as with any neuraxial procedure.    -------  --- Continue Tizanidine. Refill sent to pharmacy.   --- Follow-up for procedure    Thermal Radiofrequency Destruction  This initial thermal radiofrequency destruction (RFA) of cervical, thoracic, or lumbar paravertebral facet joint (medial branch) nerves is considered medically reasonable and necessary as this patient has met the criteria of having at least two (2) medically reasonable and necessary diagnostic MBBs, with each one providing a consistent minimum of 80% sustained relief of primary (index) pain (with the duration of relief being consistent with the agent used).       BLAYNE REPORT  As the clinician, I personally reviewed the BLAYNE from 3/25/25 while the patient was in the office today.    Dictated utilizing Dragon dictation.

## 2025-03-26 ENCOUNTER — TRANSCRIBE ORDERS (OUTPATIENT)
Dept: SURGERY | Facility: SURGERY CENTER | Age: 35
End: 2025-03-26
Payer: COMMERCIAL

## 2025-03-26 DIAGNOSIS — Z41.9 SURGERY, ELECTIVE: Primary | ICD-10-CM

## 2025-04-09 NOTE — DISCHARGE INSTRUCTIONS
[facility] - Post-procedure Instructions          --  While there are no absolute restrictions, it is recommended that you do not perform strenuous activity today. In the morning, you may resume your level of activity as before your block.    --  If you have a band-aid at your injection site, please remove it later today. Observe the area for any redness, swelling, pus-like drainage, or a temperature over 101°. If any of these symptoms occur, please call your doctor at [phone]. If after office hours, leave a message and the on-call provider will return your call.    --  Ice may be applied to your injection site. It is recommended you avoid direct heat (heating pad; hot tub) for 1-2 days.    --  Call [facility] at [phone] if you experience persistent headache, persistent bleeding from the injection site, or severe pain not relieved by heat or oral medication.    --  Do not make important decisions today.    --  Due to the effects of the block and/or the I.V. Sedation, DO NOT drive or operate hazardous machinery for 12 hours.  Local anesthetics may cause numbness after procedure and precautions must be taken with regards to operating equipment as well as with walking, even if ambulating with assistance of another person or with an assistive device.    --  Do not drink alcohol for 12 hours.    -- You may return to work tomorrow, or as directed by your referring doctor.    --  Occasionally you may notice a slight increase in your pain after the procedure. This should start to improve within the next 24-48 hours. Radiofrequency ablation procedure pain may last 3-4 weeks.    --  It may take as long as 3-4 days before you notice a gradual improvement in your pain and/or other symptoms.    -- You may continue to take your prescribed pain medication as needed.    --  Some normal possible side effects of steroid use could include fluid retention, increased blood sugar, dull headache,  "increased sweating, increased appetite, mood swings and flushing.    --  Diabetics are recommended to watch their blood glucose level closely for 24-48 hours after the injection.    --  Must stay in PACU for 20 min upon arrival and prove no leg weakness before being discharged.    --  IN THE EVENT OF A LIFE THREATENING EMERGENCY, (CHEST PAIN, BREATHING DIFFICULTIES, PARALYSIS…) YOU SHOULD GO TO YOUR NEAREST EMERGENCY ROOM.    --  You should be contacted by our office within 2-3 days to schedule follow up or next appointment date.  If not contacted within 7 days, please call the office at [phone]     Radiofrequency ablation (RFA):     - Radiofrequency ablation is a term used to describe cauterization or \"burning.\"   - In pain management, we can use this technique with a special needle to target and destroy areas that are causing your pain.   - In most cases, you must have TWO successful \"test injections\" before you are a candidate for RFA.    After your RFA:   - Because heat is used in this technique, it is common to have soreness after the procedure.  Sometimes \"neuritis\" occurs, which feels like tingling, prickly, or sunburn under the skin sensations.   - Ice packs are helpful in decreasing this soreness and preventing post-procedure \"neuritis\" pain.  Use an ice pack for 20 minutes at a time at least 3 times the day of and the day after your procedure.   - It is common to have arm/leg numbness or weakness the day of your procedure, but this should wear off by the following day.   - It may take up to 6 weeks to gain full benefit from this procedure.   "

## 2025-04-09 NOTE — SIGNIFICANT NOTE
Patient educated on the following :    - If you are receiving Sedation for your procedure Nothing to Eat 6 hours and only clear liquids for 2 hours prior to your procedure.     -You will need to have someone drive you home after your PROCEDURE and remain with you for 24 hours after the PROCEDURE  - The date of your procedure, your are welcome to have one visitor at bedside or remain within 10-15 minutes of [facility]  -You will need to arrive at 0800 on 4/11/25 for your  PROCEDURE  -Please contact [facility] at: [phone] with any questions and/or concerns

## 2025-05-02 ENCOUNTER — HOSPITAL ENCOUNTER (OUTPATIENT)
Dept: GENERAL RADIOLOGY | Facility: SURGERY CENTER | Age: 35
End: 2025-05-02
Payer: COMMERCIAL

## 2025-05-02 ENCOUNTER — HOSPITAL ENCOUNTER (OUTPATIENT)
Facility: SURGERY CENTER | Age: 35
Setting detail: HOSPITAL OUTPATIENT SURGERY
Discharge: HOME | End: 2025-05-02
Attending: ANESTHESIOLOGY | Admitting: ANESTHESIOLOGY
Payer: COMMERCIAL

## 2025-05-02 VITALS
WEIGHT: 143 LBS | HEART RATE: 87 BPM | TEMPERATURE: 98 F | BODY MASS INDEX: 27 KG/M2 | SYSTOLIC BLOOD PRESSURE: 132 MMHG | DIASTOLIC BLOOD PRESSURE: 96 MMHG | OXYGEN SATURATION: 97 % | HEIGHT: 61 IN | RESPIRATION RATE: 16 BRPM

## 2025-05-02 DIAGNOSIS — Z41.9 SURGERY, ELECTIVE: ICD-10-CM

## 2025-05-02 DIAGNOSIS — M54.50 CHRONIC BILATERAL LOW BACK PAIN, UNSPECIFIED WHETHER SCIATICA PRESENT: ICD-10-CM

## 2025-05-02 DIAGNOSIS — G89.29 CHRONIC BILATERAL LOW BACK PAIN, UNSPECIFIED WHETHER SCIATICA PRESENT: ICD-10-CM

## 2025-05-02 DIAGNOSIS — M51.360 DEGENERATION OF INTERVERTEBRAL DISC OF LUMBAR REGION WITH DISCOGENIC BACK PAIN: ICD-10-CM

## 2025-05-02 PROCEDURE — 25010000002 LIDOCAINE PF 1% 1 % SOLUTION 5 ML VIAL: Performed by: ANESTHESIOLOGY

## 2025-05-02 PROCEDURE — 81025 URINE PREGNANCY TEST: CPT | Performed by: ANESTHESIOLOGY

## 2025-05-02 PROCEDURE — 64635 DESTROY LUMB/SAC FACET JNT: CPT | Performed by: ANESTHESIOLOGY

## 2025-05-02 PROCEDURE — 64636 DESTROY L/S FACET JNT ADDL: CPT | Performed by: ANESTHESIOLOGY

## 2025-05-02 PROCEDURE — 76000 FLUOROSCOPY <1 HR PHYS/QHP: CPT

## 2025-05-02 PROCEDURE — 25010000002 LIDOCAINE PF 2% 2 % SOLUTION 5 ML VIAL: Performed by: ANESTHESIOLOGY

## 2025-05-02 PROCEDURE — 25010000002 BUPIVACAINE (PF) 0.25 % SOLUTION 10 ML VIAL: Performed by: ANESTHESIOLOGY

## 2025-05-02 RX ORDER — SODIUM CHLORIDE 0.9 % (FLUSH) 0.9 %
10 SYRINGE (ML) INJECTION AS NEEDED
Status: DISCONTINUED | OUTPATIENT
Start: 2025-05-02 | End: 2025-05-02

## 2025-05-02 RX ORDER — MECLIZINE HYDROCHLORIDE 25 MG/1
25 TABLET ORAL 3 TIMES DAILY PRN
COMMUNITY
Start: 2025-04-15

## 2025-05-02 RX ORDER — SODIUM CHLORIDE 0.9 % (FLUSH) 0.9 %
10 SYRINGE (ML) INJECTION EVERY 12 HOURS SCHEDULED
Status: DISCONTINUED | OUTPATIENT
Start: 2025-05-02 | End: 2025-05-02

## 2025-05-02 RX ORDER — ONDANSETRON 8 MG/1
8 TABLET, FILM COATED ORAL EVERY 8 HOURS PRN
COMMUNITY
Start: 2025-04-15

## 2025-05-02 RX ORDER — FENTANYL CITRATE 50 UG/ML
50 INJECTION, SOLUTION INTRAMUSCULAR; INTRAVENOUS ONCE
Refills: 0 | Status: CANCELLED | OUTPATIENT
Start: 2025-05-02 | End: 2025-05-02

## 2025-05-02 RX ORDER — MIDAZOLAM HYDROCHLORIDE 1 MG/ML
2 INJECTION, SOLUTION INTRAMUSCULAR; INTRAVENOUS ONCE
Status: CANCELLED | OUTPATIENT
Start: 2025-05-02 | End: 2025-05-02

## 2025-05-02 NOTE — OP NOTE
Radiofrequency ablation of Bilateral L3-L5  [facility]    PREOPERATIVE DIAGNOSIS:  Lumbar spondylosis without myelopathy   POSTOPERATIVE DIAGNOSIS:  Lumbar spondylosis without myelopathy     PROCEDURES PERFORMED:   Bilateral  lumbar radiofrequency ablation of the medial branches at the transverse processes of L3, L4, L5 to thermally treat these facet joints.  1.  01585 -50 Lumbar radiofrequency ablation 1st level.  2.  01862 -50 Lumbar radiofrequency ablation 2nd level.    INFORMED CONSENT:  In preprocedure discussion with the patient, the risks and complications were discussed prior to starting the procedure and informed consent was obtained.     SURGEON:   Fouzia Fabian MD    INDICATIONS:  The patient presents with chronic lower back pain. The patient underwent 2 lumbar medial branch blocks with diagnostically positive relief. Given the patient’s significant pain relief, it is diagnostic that we have likely found the source of the patient’s pain; therefore, lumbar radiofrequency ablation has been indicated.     SEDATION:  No sedation was used for this procedure    TIME OF PROCEDURE:  The Interoperative procedure time, after administration of the IV sedative, was N/A minutes.    ANESTHETIC:  Lidocaine 1% for skin infiltration, 2% lidocaine and 0.25% bupivacaine for injection.    STEROID:  None.    DESCRIPTION OF PROCEDURE:  After obtaining informed consent an IV was not  started in the preoperative area. The patient was taken to the operating room. The patient was placed in prone position. All pressure points were padded. Heart rate, blood pressure, and pulse oximetry were monitored. The patient was not  sedated. The lumbosacral area was prepped with ChloraPrep and draped in a sterile fashion.     The junction of the right S1 superior articular process and sacral ala was identified in a AP fluoroscopic view.   The image was then obliqued towards the right side to maximize visualization of the  junctions of the L3, L4, L5 superior articular processes with the transverse processes.  The skin and subcutaneous tissue inferior to the junction was anesthetized with 1% lidocaine. A 20-gauge 100mm RF Michele needle was then advanced percutaneously through the anesthetized skin tract under fluoroscopic guidance until the non-insulated portion of the needle lie at the junction. All needle tips were confirmed to be posterior to the neural foramen in the lateral fluoroscopic view. Sensory stimulation was then performed with good stimulation of the back at 0.5V or less at each level. Motor stimulation was performed up to 1.5V at each level producing stimulation of the multifidus muscles of the back and no stimulation of the lower extremity at any level. Each level was then anesthetized with 2% lidocaine prior to treatment with pulsed radiofrequency thermocoagulation at 42 degrees Celsius. Each level was then treated with thermal radiofrequency thermocoagulation at 80 degrees Celsius for 60 seconds in two separate cycles.  Prior to the removal of each needle, a volume of 1 mL of injectate was administered at each site.  The total volume consisted of 1mL of 2% lidocaine and 1mL of 0.25% bupivacaine.    The same procedure was then performed on the contralateral side in the exact same fashion.      ESTIMATED BLOOD LOSS:  Minimal.    SPECIMENS:  None.    COMPLICATIONS:  None.    TOLERANCE AND DISCHARGE:  The patient tolerated the procedure well. The patient was transported to the recovery area without difficulties. The patient was discharged home under the care of family in stable and satisfactory condition.    PLAN:  1.  The patient was given our standard instruction sheet.  2.  The patient will resume all medications per the medication reconciliation sheet.  3.  The patient will Return to clinic 4-6 wks.

## 2025-05-02 NOTE — H&P
[facility]   HISTORY AND PHYSICAL    Patient Name: Rachel Srinivasan  : 1990  MRN: [id number]  Primary Care Physician:  Kerrie Velez APRN  Date of admission: 2025    Subjective   Subjective     Chief Complaint: Low back pain    History of Present Illness  Chronic axial low back pain with diagnostic relief from lumbar Medial branch blocks.       Review of Systems   Constitutional:  Negative for chills and fever.   Respiratory:  Negative for cough and shortness of breath.    Musculoskeletal:  Positive for back pain.        Personal History     Past Medical History:   Diagnosis Date    Anemia     Antisynthetase syndrome     follows w/Dr at     Arthritis     Cervical disc disorder     Chronic sore throat     advised by PCP to have sleep study    Depression     Esophageal varices     Fibromyalgia, primary     Fibrosis of liver     GERD (gastroesophageal reflux disease)     d/t steroids    ILD (interstitial lung disease)     had normal PFTs, to have CT    Infectious mononucleosis     Joint pain     Kidney stones     history    Low back pain     Migraine     Myositis     Neck pain     Primary biliary cholangitis     Raynaud's syndrome     Rhabdomyolysis 2014    Shingles        Past Surgical History:   Procedure Laterality Date    ADENOIDECTOMY      COLONOSCOPY W/ POLYPECTOMY N/A 2022    Procedure: Colonoscopy with polypectomy;  Surgeon: Soni Funes DO;  Location: [facility] OR;  Service: Gastroenterology;  Laterality: N/A;  sigmoid polyp x1  rectal polyp x1    D & C HYSTEROSCOPY N/A 6/10/2021    Procedure: diagnostic hysteroscopy, dilatation and curettage;  Surgeon: Pablito Vo MD;  Location: [facility] OR;  Service: Obstetrics/Gynecology;  Laterality: N/A;    EAR TUBES      ENDOSCOPY N/A 2022    Procedure: Esophagogastroduodenoscopy with biopsy ;  Surgeon: Soni Funes DO;  Location: [facility] OR;  Service: Gastroenterology;  Laterality: N/A;  nereida test  esophageal  varices  gastritis    ENDOSCOPY W/ BANDING N/A 10/6/2023    Procedure: ESOPHAGOGASTRODUODENOSCOPY WITH BANDING;  Surgeon: Harpla Pinto MD;  Location:  [facility] OR;  Service: Gastroenterology;  Laterality: N/A;  PHG  Esophageal varices    ENDOSCOPY W/ BANDING N/A 10/20/2023    Procedure: ESOPHAGOGASTRODUODENOSCOPY WITH BANDING;  Surgeon: Harpal Pinto MD;  Location:  [facility] OR;  Service: Gastroenterology;  Laterality: N/A;  PHG  banding x2  esophageal varies    EYE SURGERY Right     eyelid reconstruction    MEDIAL BRANCH BLOCK Bilateral 11/22/2024    Procedure: BILATERAL L4-S1 LUMBAR MEDIAL BRANCH BLOCK CPT: 70694, 40373;  Surgeon: Fouzia Fabian MD;  Location: [facility];  Service: Pain Management;  Laterality: Bilateral;    MEDIAL BRANCH BLOCK Bilateral 3/7/2025    Procedure: BILATERAL L4-S1 LUMBAR MEDIAL BRANCH BLOCK CPT: 55932, 04935;  Surgeon: Fouzia Fabian MD;  Location: [facility];  Service: Pain Management;  Laterality: Bilateral;    MUSCLE BIOPSY  2014    thigh right    SKIN BIOPSY      right thigh    VENOUS ACCESS DEVICE (PORT) INSERTION N/A 7/6/2018    Procedure: INSERTION VENOUS ACCESS DEVICE;  Surgeon: Bo Thomas MD;  Location: [facility] OR;  Service: General    VENOUS ACCESS DEVICE (PORT) REMOVAL Right 2/7/2020    Procedure: REMOVAL VENOUS ACCESS DEVICE;  Surgeon: Bo Thomas MD;  Location: [facility] OR;  Service: General;  Laterality: Right;  REMOVAL VENOUS ACCESS DEVICE       Family History: family history includes COPD in her father; Cancer in her father, maternal grandmother, and sister; Cervical cancer in her brother; Hyperlipidemia in her mother; Hypertension in her mother; Lung cancer in her father; Throat cancer in her father. Otherwise pertinent FHx was reviewed and not pertinent to current issue.    Social History:  reports that she quit smoking about 10 years ago. Her smoking use included cigarettes. She started smoking about 26 years ago. She has a 7.5 pack-year smoking  history. She has been exposed to tobacco smoke. She has never used smokeless tobacco. She reports that she does not currently use alcohol. She reports that she does not use drugs.    Home Medications:  DULoxetine, Iron (Ferrous Sulfate), butalbital-acetaminophen-caffeine, carvedilol, cetirizine, cholestyramine, fenofibrate, fluticasone, gabapentin, hydrOXYzine, promethazine, spironolactone, tiZANidine, and ursodiol    Allergies:  Allergies   Allergen Reactions    Sumatriptan Nausea Only and Unknown - High Severity     Chest pain  Other reaction(s): Nausea Only, Unknown  Chest pain  Chest pain         Objective    Objective     Vitals:        Physical Exam  Constitutional:       General: She is not in acute distress.  Pulmonary:      Effort: Pulmonary effort is normal. No respiratory distress.   Skin:     General: Skin is warm and dry.   Neurological:      Mental Status: She is alert.   Psychiatric:         Mood and Affect: Mood normal.         Thought Content: Thought content normal.         Result Review    Result Review:  I have personally reviewed the results from the time of this admission to 5/2/2025 09:17 EDT and agree with these findings:  []  Laboratory list / accordion  []  Microbiology  []  Radiology  []  EKG/Telemetry   []  Cardiology/Vascular   []  Pathology  []  Old records  []  Other:  Most notable findings include: No new       Assessment & Plan   Assessment / Plan     Brief Patient Summary:  Rachel Srinivasan is a 34 y.o. female who has chronic axial low back pain.    Active Hospital Problems:  Active Hospital Problems    Diagnosis     Chronic bilateral low back pain     Lumbar degenerative disc disease      Plan: Lumbar Radiofrequency ablation (thermal, non-pulsed) at bilateral L4-S1       VTE Prophylaxis:  No VTE prophylaxis order currently exists.      Fouzia Fabian MD

## 2025-05-26 DIAGNOSIS — M62.838 MUSCLE SPASM: ICD-10-CM

## 2025-05-27 NOTE — TELEPHONE ENCOUNTER
Patient refill her tizanidine but please instruct her that her ablation was on 5/2/2025 and that it can take up to 6 weeks to notice the full benefit from this procedure.

## 2025-06-10 ENCOUNTER — OFFICE VISIT (OUTPATIENT)
Age: 35
End: 2025-06-10
Payer: COMMERCIAL

## 2025-06-10 VITALS
HEIGHT: 61 IN | DIASTOLIC BLOOD PRESSURE: 95 MMHG | HEART RATE: 86 BPM | WEIGHT: 149.6 LBS | TEMPERATURE: 97.1 F | BODY MASS INDEX: 28.25 KG/M2 | OXYGEN SATURATION: 98 % | SYSTOLIC BLOOD PRESSURE: 118 MMHG

## 2025-06-10 DIAGNOSIS — M62.838 MUSCLE SPASM: ICD-10-CM

## 2025-06-10 DIAGNOSIS — B02.29 POST HERPETIC NEURALGIA: ICD-10-CM

## 2025-06-10 DIAGNOSIS — M54.50 CHRONIC BILATERAL LOW BACK PAIN, UNSPECIFIED WHETHER SCIATICA PRESENT: Primary | ICD-10-CM

## 2025-06-10 DIAGNOSIS — M79.7 FIBROMYALGIA: ICD-10-CM

## 2025-06-10 DIAGNOSIS — M51.360 DEGENERATION OF INTERVERTEBRAL DISC OF LUMBAR REGION WITH DISCOGENIC BACK PAIN: ICD-10-CM

## 2025-06-10 DIAGNOSIS — G89.29 CHRONIC BILATERAL LOW BACK PAIN, UNSPECIFIED WHETHER SCIATICA PRESENT: Primary | ICD-10-CM

## 2025-06-10 RX ORDER — RIMEGEPANT SULFATE 75 MG/75MG
75 TABLET, ORALLY DISINTEGRATING ORAL ONCE
COMMUNITY
Start: 2025-04-15

## 2025-06-10 NOTE — PROGRESS NOTES
CHIEF COMPLAINT  Back pain     Subjective   Rachel Srinivasan is a 34 y.o. female  who presents to the office for follow-up of procedure.  She completed a Bilateral L4-S1 RFA on 5/2/25 performed by Dr. Fabian for management of low back pain. Patient reports no relief from the procedure.     Today pain is 4/10VAS in severity. Her main complaint is axial low back pain that radiates into lower abdominal region. Denies radicular pain. Describes this pain as a nearly continuous aching, burning, and throbbing. Pain is worsened by prolonged sitting or positioned and lying flat. Pain improves with rest/reposition, heat, and medications.     Current pain regimen includes OTC Tylenol PRN, Cymbalta 60mg daily (for fibro pain), and Tizanidine 4mg PRN (noes intermittent drowsiness) . She has been restarted on Gabapentin 300mg 1-2/day PRN for anxiety.      No relief with Flexeril. She has taken Tramadol and Tylenol with Codeine in the past for past which offered some relief.      History of 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Dermatomyositis) - followed by Dr. Sethi with Rheumatology at . She receives lab work every 3 months. Liver biopsy 5/2023. MRI abdomen completed on 3/10/25. She is maintained on iron tablets, Ursodiol, and spironolactone.      Procedures:  5/2/25 - Bilateral L4-S1 RFA - no relief   3/7/25 - Bilateral L4-S1 MBB - 90% relief for 2 hours  11/22/24 - Bilateral L4-S1 MBB - 85% relief for several hours    Back Pain  This is a chronic problem. The current episode started more than 1 year ago. The problem occurs constantly. The problem is unchanged. The pain is present in the lumbar spine and thoracic spine. The quality of the pain is described as aching and burning. The pain does not radiate. The pain is at a severity of 4/10. The symptoms are aggravated by sitting, position and lying down. Associated symptoms include abdominal pain, headaches, numbness and weakness. Pertinent negatives include no chest pain, dysuria  "or fever. She has tried muscle relaxant and heat for the symptoms.      PEG Assessment   What number best describes your pain on average in the past week?4  What number best describes how, during the past week, pain has interfered with your enjoyment of life?0  What number best describes how, during the past week, pain has interfered with your general activity?  5    Review of Pertinent Medical Data ---      The following portions of the patient's history were reviewed and updated as appropriate: allergies, current medications, past family history, past medical history, past social history, past surgical history, and problem list.    Review of Systems   Constitutional:  Positive for activity change (decreased) and fatigue. Negative for chills and fever.   HENT:  Negative for congestion.    Eyes:  Negative for visual disturbance.   Respiratory:  Negative for chest tightness and shortness of breath.    Cardiovascular:  Negative for chest pain.   Gastrointestinal:  Positive for abdominal pain and constipation. Negative for diarrhea.   Genitourinary:  Negative for difficulty urinating, dyspareunia and dysuria.   Musculoskeletal:  Positive for back pain.   Neurological:  Positive for dizziness, weakness, light-headedness, numbness and headaches.   Psychiatric/Behavioral:  Positive for sleep disturbance. Negative for agitation, self-injury and suicidal ideas. The patient is not nervous/anxious.      I have reviewed and confirmed the accuracy of the ROS as documented by the MA/CHADWICKN/RN NURY Warner    Vitals:    06/10/25 1505   BP: 118/95   Pulse: 86   Temp: 97.1 °F (36.2 °C)   SpO2: 98%   Weight: 67.9 kg (149 lb 9.6 oz)   Height: 154.9 cm (61\")   PainSc: 4    PainLoc: Back     Objective   Physical Exam  Constitutional:       Appearance: Normal appearance.   HENT:      Head: Normocephalic.   Cardiovascular:      Rate and Rhythm: Normal rate and regular rhythm.   Pulmonary:      Effort: Pulmonary effort is normal. "      Breath sounds: Normal breath sounds.   Abdominal:       Musculoskeletal:      Cervical back: Normal range of motion.      Lumbar back: Tenderness and bony tenderness present. Decreased range of motion. Negative right straight leg raise test and negative left straight leg raise test.      Comments: + lumbar facet loading/tenderness  - SI joint tenderness bilaterally  - NISA'S test bilaterally    Skin:     General: Skin is warm and dry.      Capillary Refill: Capillary refill takes less than 2 seconds.   Neurological:      General: No focal deficit present.      Mental Status: She is alert and oriented to person, place, and time.   Psychiatric:         Mood and Affect: Mood normal.         Behavior: Behavior normal.         Thought Content: Thought content normal.         Cognition and Memory: Cognition normal.       Assessment & Plan   Diagnoses and all orders for this visit:    1. Chronic bilateral low back pain, unspecified whether sciatica present (Primary)    2. Degeneration of intervertebral disc of lumbar region with discogenic back pain    3. Muscle spasm    4. Post herpetic neuralgia    5. Fibromyalgia      Rachel Srinivasan reports a pain score of 4.  Given her pain assessment as noted, treatment options were discussed and the following options were decided upon as a follow-up plan to address the patient's pain: continuation of current treatment plan for pain.    --- Will review plan of care with Dr. Fabian. If appropriate, may consider updating thoracic/lumbar MRI to determine if back pain may be contributing to abdominal pain.  Also recommended that she discuss continued complaints of abdominal pain with her OB/GYN to rule out any underlying cause of this pain.   Addendum 6/12/25 - Reviewed plan of care with Dr. Fabian. Will update lumbar and thoracic imaging for further evaluation of back and abdominal pain.   --- Continue Tizanidine. No refill needed at this time.   --- Follow-up as needed       BLAYNE REPORT  As the clinician, I personally reviewed the BLAYNE from 6/10/25 while the patient was in the office today.    Dictated utilizing Dragon dictation.

## 2025-07-13 ENCOUNTER — HOSPITAL ENCOUNTER (EMERGENCY)
Facility: HOSPITAL | Age: 35
Discharge: HOME | End: 2025-07-13
Attending: STUDENT IN AN ORGANIZED HEALTH CARE EDUCATION/TRAINING PROGRAM | Admitting: STUDENT IN AN ORGANIZED HEALTH CARE EDUCATION/TRAINING PROGRAM
Payer: COMMERCIAL

## 2025-07-13 ENCOUNTER — APPOINTMENT (OUTPATIENT)
Dept: GENERAL RADIOLOGY | Facility: HOSPITAL | Age: 35
End: 2025-07-13
Payer: COMMERCIAL

## 2025-07-13 VITALS
TEMPERATURE: 98.4 F | BODY MASS INDEX: 26.43 KG/M2 | SYSTOLIC BLOOD PRESSURE: 135 MMHG | OXYGEN SATURATION: 100 % | HEART RATE: 88 BPM | DIASTOLIC BLOOD PRESSURE: 85 MMHG | WEIGHT: 140 LBS | HEIGHT: 61 IN | RESPIRATION RATE: 16 BRPM

## 2025-07-13 DIAGNOSIS — R09.1 PLEURISY: Primary | ICD-10-CM

## 2025-07-13 LAB
ALBUMIN SERPL-MCNC: 4 G/DL (ref 3.5–5.2)
ALBUMIN/GLOB SERPL: 1.7 G/DL
ALP SERPL-CCNC: 89 U/L (ref 39–117)
ALT SERPL W P-5'-P-CCNC: 23 U/L (ref 1–33)
ANION GAP SERPL CALCULATED.3IONS-SCNC: 12.3 MMOL/L (ref 5–15)
AST SERPL-CCNC: 22 U/L (ref 1–32)
BASOPHILS # BLD AUTO: 0.09 10*3/MM3 (ref 0–0.2)
BASOPHILS NFR BLD AUTO: 1 % (ref 0–1.5)
BILIRUB SERPL-MCNC: 0.5 MG/DL (ref 0–1.2)
BUN SERPL-MCNC: 8.7 MG/DL (ref 6–20)
BUN/CREAT SERPL: 13 (ref 7–25)
CALCIUM SPEC-SCNC: 9.2 MG/DL (ref 8.6–10.5)
CHLORIDE SERPL-SCNC: 105 MMOL/L (ref 98–107)
CLUMPED PLATELETS: PRESENT
CO2 SERPL-SCNC: 22.7 MMOL/L (ref 22–29)
CREAT SERPL-MCNC: 0.67 MG/DL (ref 0.57–1)
DEPRECATED RDW RBC AUTO: 46.2 FL (ref 37–54)
EGFRCR SERPLBLD CKD-EPI 2021: 116.3 ML/MIN/1.73
EOSINOPHIL # BLD AUTO: 0.36 10*3/MM3 (ref 0–0.4)
EOSINOPHIL NFR BLD AUTO: 3.8 % (ref 0.3–6.2)
ERYTHROCYTE [DISTWIDTH] IN BLOOD BY AUTOMATED COUNT: 14.8 % (ref 12.3–15.4)
GLOBULIN UR ELPH-MCNC: 2.3 GM/DL
GLUCOSE SERPL-MCNC: 80 MG/DL (ref 65–99)
HCT VFR BLD AUTO: 39.1 % (ref 34–46.6)
HGB BLD-MCNC: 13 G/DL (ref 12–15.9)
IMM GRANULOCYTES # BLD AUTO: 0.02 10*3/MM3 (ref 0–0.05)
IMM GRANULOCYTES NFR BLD AUTO: 0.2 % (ref 0–0.5)
LYMPHOCYTES # BLD AUTO: 1.16 10*3/MM3 (ref 0.7–3.1)
LYMPHOCYTES NFR BLD AUTO: 12.3 % (ref 19.6–45.3)
MCH RBC QN AUTO: 28.6 PG (ref 26.6–33)
MCHC RBC AUTO-ENTMCNC: 33.2 G/DL (ref 31.5–35.7)
MCV RBC AUTO: 86.1 FL (ref 79–97)
MONOCYTES # BLD AUTO: 1.13 10*3/MM3 (ref 0.1–0.9)
MONOCYTES NFR BLD AUTO: 12 % (ref 5–12)
NEUTROPHILS NFR BLD AUTO: 6.67 10*3/MM3 (ref 1.7–7)
NEUTROPHILS NFR BLD AUTO: 70.7 % (ref 42.7–76)
NRBC BLD AUTO-RTO: 0 /100 WBC (ref 0–0.2)
NT-PROBNP SERPL-MCNC: 47.4 PG/ML (ref 0–450)
PLATELET # BLD AUTO: 91 10*3/MM3 (ref 140–450)
PMV BLD AUTO: 11.7 FL (ref 6–12)
POTASSIUM SERPL-SCNC: 3.7 MMOL/L (ref 3.5–5.2)
PROT SERPL-MCNC: 6.3 G/DL (ref 6–8.5)
RBC # BLD AUTO: 4.54 10*6/MM3 (ref 3.77–5.28)
RBC MORPH BLD: NORMAL
SODIUM SERPL-SCNC: 140 MMOL/L (ref 136–145)
TROPONIN T SERPL HS-MCNC: <6 NG/L
WBC MORPH BLD: NORMAL
WBC NRBC COR # BLD AUTO: 9.43 10*3/MM3 (ref 3.4–10.8)

## 2025-07-13 PROCEDURE — 84484 ASSAY OF TROPONIN QUANT: CPT | Performed by: STUDENT IN AN ORGANIZED HEALTH CARE EDUCATION/TRAINING PROGRAM

## 2025-07-13 PROCEDURE — 85025 COMPLETE CBC W/AUTO DIFF WBC: CPT | Performed by: STUDENT IN AN ORGANIZED HEALTH CARE EDUCATION/TRAINING PROGRAM

## 2025-07-13 PROCEDURE — 99283 EMERGENCY DEPT VISIT LOW MDM: CPT | Performed by: STUDENT IN AN ORGANIZED HEALTH CARE EDUCATION/TRAINING PROGRAM

## 2025-07-13 PROCEDURE — 83880 ASSAY OF NATRIURETIC PEPTIDE: CPT | Performed by: STUDENT IN AN ORGANIZED HEALTH CARE EDUCATION/TRAINING PROGRAM

## 2025-07-13 PROCEDURE — 85007 BL SMEAR W/DIFF WBC COUNT: CPT | Performed by: STUDENT IN AN ORGANIZED HEALTH CARE EDUCATION/TRAINING PROGRAM

## 2025-07-13 PROCEDURE — 80053 COMPREHEN METABOLIC PANEL: CPT | Performed by: STUDENT IN AN ORGANIZED HEALTH CARE EDUCATION/TRAINING PROGRAM

## 2025-07-13 PROCEDURE — 71046 X-RAY EXAM CHEST 2 VIEWS: CPT

## 2025-07-13 RX ORDER — SODIUM CHLORIDE 0.9 % (FLUSH) 0.9 %
10 SYRINGE (ML) INJECTION AS NEEDED
Status: DISCONTINUED | OUTPATIENT
Start: 2025-07-13 | End: 2025-07-13 | Stop reason: HOSPADM

## 2025-07-13 RX ORDER — METHYLPREDNISOLONE 4 MG/1
TABLET ORAL
Qty: 21 TABLET | Refills: 0 | Status: SHIPPED | OUTPATIENT
Start: 2025-07-13

## 2025-07-13 NOTE — ED PROVIDER NOTES
EMERGENCY ROOM NOTE  [facility]    SUBJECTIVE    Chest Pain      Rachel Srinivasan is a 34 y.o. female presenting to the ER with chest wall pain.  Patient states she has had had a cough for almost 2 weeks.  She was initially diagnosed with bronchitis and completed a steroid pack.  She was then seen by her PCP 2 days ago and was given Augmentin.  She continues to have a cough and is now having some associated chest wall pain.  States her cough is minimally productive.  No hemoptysis.  No exertional chest pain.  No radiating pain.  No abdominal pain.  No fevers or chills.  She denies any shortness of breath.      A 10-point review of systems was obtained and otherwise negative unless stated in the HPI    Past Medical History:   Diagnosis Date    Anemia     Antisynthetase syndrome     follows w/Dr at     Arthritis     Cervical disc disorder     Chronic sore throat     advised by PCP to have sleep study    Depression     Esophageal varices     Fibromyalgia, primary     Fibrosis of liver     GERD (gastroesophageal reflux disease)     d/t steroids    ILD (interstitial lung disease)     had normal PFTs, to have CT    Infectious mononucleosis     Joint pain     Kidney stones     history    Low back pain     Migraine     Myositis     Neck pain     Primary biliary cholangitis     Raynaud's syndrome     Rhabdomyolysis 07/2014    Shingles        Allergies   Allergen Reactions    Sumatriptan Nausea Only and Unknown - High Severity     Chest pain  Other reaction(s): Nausea Only, Unknown  Chest pain  Chest pain         Past Surgical History:   Procedure Laterality Date    ADENOIDECTOMY      COLONOSCOPY W/ POLYPECTOMY N/A 12/1/2022    Procedure: Colonoscopy with polypectomy;  Surgeon: Soni Funes DO;  Location: [facility];  Service: Gastroenterology;  Laterality: N/A;  sigmoid polyp x1  rectal polyp x1    D & C HYSTEROSCOPY N/A 6/10/2021    Procedure: diagnostic hysteroscopy, dilatation and curettage;   Surgeon: Pablito Vo MD;  Location: [facility] OR;  Service: Obstetrics/Gynecology;  Laterality: N/A;    EAR TUBES      ENDOSCOPY N/A 12/1/2022    Procedure: Esophagogastroduodenoscopy with biopsy ;  Surgeon: Soni Funes DO;  Location: [facility] OR;  Service: Gastroenterology;  Laterality: N/A;  nereida test  esophageal varices  gastritis    ENDOSCOPY W/ BANDING N/A 10/6/2023    Procedure: ESOPHAGOGASTRODUODENOSCOPY WITH BANDING;  Surgeon: Harpal Pinto MD;  Location: [facility] OR;  Service: Gastroenterology;  Laterality: N/A;  PHG  Esophageal varices    ENDOSCOPY W/ BANDING N/A 10/20/2023    Procedure: ESOPHAGOGASTRODUODENOSCOPY WITH BANDING;  Surgeon: Harpal Pinto MD;  Location: [facility] OR;  Service: Gastroenterology;  Laterality: N/A;  PHG  banding x2  esophageal varies    EYE SURGERY Right     eyelid reconstruction    MEDIAL BRANCH BLOCK Bilateral 11/22/2024    Procedure: BILATERAL L4-S1 LUMBAR MEDIAL BRANCH BLOCK CPT: 12804, 70555;  Surgeon: Fouzia Fabian MD;  [place] [facility];  Service: Pain Management;  Laterality: Bilateral;    MEDIAL BRANCH BLOCK Bilateral 3/7/2025    Procedure: BILATERAL L4-S1 LUMBAR MEDIAL BRANCH BLOCK CPT: 60618, 04086;  Surgeon: Fouzia Fabian MD;  [place]: [facility];  Service: Pain Management;  Laterality: Bilateral;    MUSCLE BIOPSY  2014    thigh right    RADIOFREQUENCY ABLATION Bilateral 5/2/2025    Procedure: BILATERAL L4-S1 RADIOFREQUENCY ABLATION LUMBAR CPT: 81168, 11313;  Surgeon: Fouzia Fabian MD;  [place]: [facility];  Service: Pain Management;  Laterality: Bilateral;    SKIN BIOPSY      right thigh    VENOUS ACCESS DEVICE (PORT) INSERTION N/A 7/6/2018    Procedure: INSERTION VENOUS ACCESS DEVICE;  Surgeon: Bo Thomas MD;  Location: [facility] OR;  Service: General    VENOUS ACCESS DEVICE (PORT) REMOVAL Right 2/7/2020    Procedure: REMOVAL VENOUS ACCESS DEVICE;  Surgeon: Bo Thomas MD;  Location: [facility];  Service: General;   "Laterality: Right;  REMOVAL VENOUS ACCESS DEVICE       Family History   Problem Relation Age of Onset    Hyperlipidemia Mother     Hypertension Mother     Lung cancer Father     Cancer Father     Throat cancer Father     COPD Father     Cancer Sister     Cervical cancer Brother     Cancer Maternal Grandmother     Malig Hyperthermia Neg Hx        Social History     Socioeconomic History    Marital status:      Spouse name: Jaquan   Tobacco Use    Smoking status: Former     Current packs/day: 0.00     Average packs/day: 0.5 packs/day for 15.0 years (7.5 ttl pk-yrs)     Types: Cigarettes     Start date: 3/13/1999     Quit date: 2014     Years since quittin.0     Passive exposure: Past    Smokeless tobacco: Never   Vaping Use    Vaping status: Never Used   Substance and Sexual Activity    Alcohol use: Not Currently    Drug use: Never    Sexual activity: Yes     Partners: Male     Birth control/protection: Natural family planning/Rhythm, Partner of same sex         OBJECTIVE  VITAL SIGNS:   Vitals:    25 1342 25 1621   BP: 143/92 135/85   BP Location: Right arm Left arm   Patient Position: Lying Lying   Pulse: 94 88   Resp: 16 16   Temp: 98.4 °F (36.9 °C)    TempSrc: Oral    SpO2: 100%    Weight: 63.5 kg (140 lb)    Height: 154.9 cm (61\")         Constitutional:   Well developed and resting comfortable. No acute distress.  HENT:  Atraumatic. Normocephalic. Bilateral external ears normal. Nose normal.    Eyes:  Conjunctiva normal.  No periorbital edema.  Neck:  ROM normal, supple.    Cardiovascular:   RRR without murmurs, rubs, or gallops. Extremities appear warm and well perfused.  Thorax & Lungs:  Respiratory effort normal.  Lungs CTAB   Abdomen:  Nondistended.  Nontender.  Musculoskeletal:  No deformity or swelling. No edema.    Skin:  Warm and dry.    Neurologic:  Awake and alert .  Psychiatric:  Affect normal. Thought process is linear and goal directed "       EKG  Procedures    PROCEDURES/ULTRASOUND      ED COURSE AND MEDICAL DECISION MAKING  Pertinent Labs & Imaging studies reviewed. (See chart for details).    Rachel Srinivasan is a 34 y.o. female presenting to the ER with  chest wall pain and cough.  Her examination was unremarkable.  Laboratory evaluation including cardiac workup and chest x-ray without acute findings.  Low suspicion for ACS, PE, dissection.  No evidence of pneumonia.  Given recent diagnosis of bronchitis and pneumonia suspect likely pleurisy.  She is unable to take ibuprofen.  I recommended Tylenol and another Medrol Dosepak.  Reasons to seek reevaluation discussed.            Medications Administered  Medications - No data to display    Final diagnoses:   Pleurisy     ED Disposition       ED Disposition   Discharge    Condition   Stable    Comment   --             Kerrie Velez, APRN  [address]  [facility] [id number] [phone]    Schedule an appointment as soon as possible for a visit       [facility] EMERGENCY DEPARTMENT  [address] [place] [id number] [phone]    If symptoms worsen       Where to Get Your Medications        These medications were sent to [id number] - [place] - [address] - [phone]  - [phone] FX  [address], [place] [address]      Phone: [phone]   methylPREDNISolone 4 MG dose pack          Medication List      No changes were made to your prescriptions during this visit.            In cases where narcotics are prescribed, BLAYNE report was obtained, reviewed, and made part of record. After examining available information, and risks of prescribing or dispensing controlled substances was explained to the patient (including non-treatment or other treatment), it is considered medically appropriate to administer  narcotics as prescribed.    Part of this note may be an electronic transcription/translation of spoken language to  printed text using the Dragon Dictation System.     MD Darrian Loyd Calyn Michele, MD  07/13/25 1949

## 2025-07-14 DIAGNOSIS — M62.838 MUSCLE SPASM: ICD-10-CM

## 2025-07-15 ENCOUNTER — HOSPITAL ENCOUNTER (OUTPATIENT)
Dept: MRI IMAGING | Facility: HOSPITAL | Age: 35
Discharge: HOME | End: 2025-07-15
Payer: COMMERCIAL

## 2025-07-15 DIAGNOSIS — M54.50 CHRONIC BILATERAL LOW BACK PAIN, UNSPECIFIED WHETHER SCIATICA PRESENT: ICD-10-CM

## 2025-07-15 DIAGNOSIS — M54.50 CHRONIC BILATERAL LOW BACK PAIN WITHOUT SCIATICA: Primary | ICD-10-CM

## 2025-07-15 DIAGNOSIS — G89.29 CHRONIC BILATERAL LOW BACK PAIN, UNSPECIFIED WHETHER SCIATICA PRESENT: ICD-10-CM

## 2025-07-15 DIAGNOSIS — M54.6 CHRONIC BILATERAL THORACIC BACK PAIN: ICD-10-CM

## 2025-07-15 DIAGNOSIS — G89.29 CHRONIC BILATERAL LOW BACK PAIN WITHOUT SCIATICA: Primary | ICD-10-CM

## 2025-07-15 DIAGNOSIS — G89.29 CHRONIC BILATERAL THORACIC BACK PAIN: ICD-10-CM

## 2025-07-15 PROCEDURE — 72148 MRI LUMBAR SPINE W/O DYE: CPT

## 2025-07-15 PROCEDURE — 72146 MRI CHEST SPINE W/O DYE: CPT

## 2025-07-24 ENCOUNTER — HOSPITAL ENCOUNTER (OUTPATIENT)
Dept: GENERAL RADIOLOGY | Facility: HOSPITAL | Age: 35
Discharge: HOME | End: 2025-07-24
Admitting: NURSE PRACTITIONER
Payer: COMMERCIAL

## 2025-07-24 ENCOUNTER — TRANSCRIBE ORDERS (OUTPATIENT)
Dept: ADMINISTRATIVE | Facility: HOSPITAL | Age: 35
End: 2025-07-24
Payer: COMMERCIAL

## 2025-07-24 DIAGNOSIS — R05.3 CHRONIC COUGH: Primary | ICD-10-CM

## 2025-07-24 DIAGNOSIS — R05.3 CHRONIC COUGH: ICD-10-CM

## 2025-07-24 PROCEDURE — 71046 X-RAY EXAM CHEST 2 VIEWS: CPT

## 2025-08-08 ENCOUNTER — TRANSCRIBE ORDERS (OUTPATIENT)
Dept: ADMINISTRATIVE | Facility: HOSPITAL | Age: 35
End: 2025-08-08
Payer: COMMERCIAL

## 2025-08-08 DIAGNOSIS — R05.3 CHRONIC COUGH: Primary | ICD-10-CM

## 2025-08-14 ENCOUNTER — HOSPITAL ENCOUNTER (OUTPATIENT)
Dept: CT IMAGING | Facility: HOSPITAL | Age: 35
Discharge: HOME | End: 2025-08-14
Admitting: NURSE PRACTITIONER
Payer: COMMERCIAL

## 2025-08-14 DIAGNOSIS — R05.3 CHRONIC COUGH: ICD-10-CM

## 2025-08-14 PROCEDURE — 71250 CT THORAX DX C-: CPT

## 2025-08-27 ENCOUNTER — OFFICE VISIT (OUTPATIENT)
Dept: NEUROSURGERY | Facility: CLINIC | Age: 35
End: 2025-08-27
Payer: COMMERCIAL

## 2025-08-27 VITALS
DIASTOLIC BLOOD PRESSURE: 88 MMHG | TEMPERATURE: 97.7 F | WEIGHT: 150.3 LBS | OXYGEN SATURATION: 99 % | SYSTOLIC BLOOD PRESSURE: 118 MMHG | BODY MASS INDEX: 28.4 KG/M2 | HEART RATE: 96 BPM

## 2025-08-27 DIAGNOSIS — M46.1 SI (SACROILIAC) JOINT INFLAMMATION: Primary | ICD-10-CM

## 2025-08-27 DIAGNOSIS — G95.0 SYRINX OF SPINAL CORD: ICD-10-CM

## 2025-08-27 RX ORDER — OMEPRAZOLE 40 MG/1
40 CAPSULE, DELAYED RELEASE ORAL DAILY
COMMUNITY
Start: 2025-07-28

## 2025-08-27 RX ORDER — ALBUTEROL SULFATE 90 UG/1
AEROSOL, METERED RESPIRATORY (INHALATION)
COMMUNITY
Start: 2025-07-21

## 2025-08-27 RX ORDER — BUDESONIDE AND FORMOTEROL FUMARATE DIHYDRATE 160; 4.5 UG/1; UG/1
AEROSOL RESPIRATORY (INHALATION)
COMMUNITY
Start: 2025-07-16

## 2025-08-27 RX ORDER — ONDANSETRON 8 MG/1
TABLET, FILM COATED ORAL
COMMUNITY

## 2025-08-28 ENCOUNTER — PATIENT ROUNDING (BHMG ONLY) (OUTPATIENT)
Dept: NEUROSURGERY | Facility: CLINIC | Age: 35
End: 2025-08-28
Payer: COMMERCIAL

## 2025-08-28 DIAGNOSIS — M62.838 MUSCLE SPASM: ICD-10-CM

## (undated) DEVICE — DECANT BG O JET

## (undated) DEVICE — GLV SURG TRIUMPH PF LTX 7.5 STRL

## (undated) DEVICE — THE SINGLE USE ETRAP – POLYP TRAP IS USED FOR SUCTION RETRIEVAL OF ENDOSCOPICALLY REMOVED POLYPS.: Brand: ETRAP

## (undated) DEVICE — SUT SILK 2/0 TIES 18IN A185H

## (undated) DEVICE — KT ORCA ORCAPOD DISP STRL

## (undated) DEVICE — DECANTER: Brand: UNBRANDED

## (undated) DEVICE — ADAPT CLN BIOGUARD AIR/H2O DISP

## (undated) DEVICE — THE BITE BLOCK MAXI, LATEX FREE STRAP IS USED TO PROTECT THE ENDOSCOPE INSERTION TUBE FROM BEING BITTEN BY THE PATIENT.

## (undated) DEVICE — GLV SURG EUDERMIC PF LTX 8 STRL

## (undated) DEVICE — INTENDED FOR TISSUE SEPARATION, AND OTHER PROCEDURES THAT REQUIRE A SHARP SURGICAL BLADE TO PUNCTURE OR CUT.: Brand: BARD-PARKER ® STAINLESS STEEL BLADES

## (undated) DEVICE — GOWN,PREVENTION PLUS,XXLARGE,STERILE: Brand: MEDLINE

## (undated) DEVICE — BW-412T DISP COMBO CLEANING BRUSH: Brand: SINGLE USE COMBINATION CLEANING BRUSH

## (undated) DEVICE — DRSNG SURESITE WNDW 4X4.5

## (undated) DEVICE — EPIDURAL TRAY: Brand: MEDLINE INDUSTRIES, INC.

## (undated) DEVICE — GLV SURG SENSICARE W/ALOE PF LF 7.5 STRL

## (undated) DEVICE — SYR LL TP 10ML STRL

## (undated) DEVICE — TOWEL,OR,DSP,ST,BLUE,STD,4/PK,20PK/CS: Brand: MEDLINE

## (undated) DEVICE — PAD GRND CATHAY W/CABL DISP

## (undated) DEVICE — SUT MNCRYL 4/0 PS2 18 IN

## (undated) DEVICE — APPL CHLORAPREP W/TINT 26ML ORNG

## (undated) DEVICE — LAG MINOR PROCEDURE: Brand: MEDLINE INDUSTRIES, INC.

## (undated) DEVICE — SOL IRR H2O BTL 1000ML STRL

## (undated) DEVICE — CYSTO/BLADDER IRRIGATION SET, REGULATING CLAMP

## (undated) DEVICE — PAD GRND E/S W/CORD SPLT A/

## (undated) DEVICE — SUT VIC 3/0 CTI 36IN J944H

## (undated) DEVICE — GLV SURG TRIUMPH PF LTX 7 STRL

## (undated) DEVICE — 1000ML,PRESSURE INFUSER W/STOPCOCK: Brand: MEDLINE

## (undated) DEVICE — NEEDLE, QUINCKE, 22GX5": Brand: MEDLINE

## (undated) DEVICE — DRP Z/FRICTION 10X16IN

## (undated) DEVICE — LINER SURG CANSTR SXN S/RIGD 1500CC

## (undated) DEVICE — SYR LL 3CC

## (undated) DEVICE — FRAZIER SUCTION INSTRUMENT 10 FR W/CONTROL VENT & OBTURATOR: Brand: FRAZIER

## (undated) DEVICE — LAB CORP AGAR SLANT UREA PK/10

## (undated) DEVICE — Device

## (undated) DEVICE — MULTIPLE BAND LIGATOR: Brand: SPEEDBAND SUPERVIEW SUPER 7

## (undated) DEVICE — SNAR POLYP SENSATION STDOVL 27 240 BX40

## (undated) DEVICE — NDL HYPO PRECISIONGLIDE REG 25G 1 1/2

## (undated) DEVICE — SUT SILK 2/0 SH 30IN K833H

## (undated) DEVICE — PENCL E/S HNDSWCH PUSHBTN HOLSTR 10FT

## (undated) DEVICE — FRCP BX RADJAW4 NDL 2.8 240CM LG OG BX40

## (undated) DEVICE — NDL SPINE 22G 31/2IN BLK

## (undated) DEVICE — SAFELINER SUCTION CANISTER 1000CC: Brand: DEROYAL

## (undated) DEVICE — LAG PERI GYN: Brand: MEDLINE INDUSTRIES, INC.

## (undated) DEVICE — TUBING, SUCTION, 1/4" X 12', STRAIGHT: Brand: MEDLINE

## (undated) DEVICE — SYR SLP TP 10ML DISP

## (undated) DEVICE — SPNG GZ STRL 2S 4X4 12PLY

## (undated) DEVICE — VIAL FORMALIN CAP 10P 40ML

## (undated) DEVICE — SUCTION CANISTER, 1000CC,SAFELINER: Brand: DEROYAL

## (undated) DEVICE — CURETTE ENDOMTRL SXN

## (undated) DEVICE — DRP C/ARM 41X74IN

## (undated) DEVICE — SUT ETHIB 0/0 MO6 I8IN CX45D